# Patient Record
Sex: MALE | Race: WHITE | NOT HISPANIC OR LATINO | Employment: OTHER | URBAN - METROPOLITAN AREA
[De-identification: names, ages, dates, MRNs, and addresses within clinical notes are randomized per-mention and may not be internally consistent; named-entity substitution may affect disease eponyms.]

---

## 2018-11-30 ENCOUNTER — CONVERSION ENCOUNTER (OUTPATIENT)
Dept: OTHER | Facility: HOSPITAL | Age: 73
End: 2018-11-30

## 2018-11-30 ENCOUNTER — OFFICE VISIT CONVERTED (OUTPATIENT)
Dept: OTHER | Facility: HOSPITAL | Age: 73
End: 2018-11-30
Attending: SPECIALIST

## 2019-01-22 ENCOUNTER — CONVERSION ENCOUNTER (OUTPATIENT)
Dept: CARDIOLOGY | Facility: CLINIC | Age: 74
End: 2019-01-22
Attending: SPECIALIST

## 2020-01-21 ENCOUNTER — OFFICE VISIT CONVERTED (OUTPATIENT)
Dept: CARDIOLOGY | Facility: CLINIC | Age: 75
End: 2020-01-21
Attending: SPECIALIST

## 2020-06-26 ENCOUNTER — CONVERSION ENCOUNTER (OUTPATIENT)
Dept: SURGERY | Facility: CLINIC | Age: 75
End: 2020-06-26

## 2020-07-10 ENCOUNTER — OFFICE VISIT CONVERTED (OUTPATIENT)
Dept: SURGERY | Facility: CLINIC | Age: 75
End: 2020-07-10
Attending: SURGERY

## 2020-07-10 ENCOUNTER — CONVERSION ENCOUNTER (OUTPATIENT)
Dept: SURGERY | Facility: CLINIC | Age: 75
End: 2020-07-10

## 2020-07-27 ENCOUNTER — HOSPITAL ENCOUNTER (OUTPATIENT)
Dept: PREADMISSION TESTING | Facility: HOSPITAL | Age: 75
Discharge: HOME | End: 2020-07-27
Attending: SURGERY

## 2020-07-28 LAB — SARS-COV-2 RNA SPEC QL NAA+PROBE: NOT DETECTED

## 2020-07-30 ENCOUNTER — HOSPITAL ENCOUNTER (OUTPATIENT)
Dept: PERIOP | Facility: HOSPITAL | Age: 75
Setting detail: HOSPITAL OUTPATIENT SURGERY
Discharge: HOME | End: 2020-07-30
Attending: SURGERY

## 2020-08-11 ENCOUNTER — OFFICE VISIT CONVERTED (OUTPATIENT)
Dept: SURGERY | Facility: CLINIC | Age: 75
End: 2020-08-11
Attending: SURGERY

## 2020-09-21 ENCOUNTER — HOSPITAL ENCOUNTER (OUTPATIENT)
Dept: OTHER | Facility: HOSPITAL | Age: 75
Discharge: HOME | End: 2020-09-21
Attending: FAMILY MEDICINE

## 2020-10-02 ENCOUNTER — CONVERSION ENCOUNTER (OUTPATIENT)
Dept: OTHER | Facility: HOSPITAL | Age: 75
End: 2020-10-02

## 2020-10-02 ENCOUNTER — OFFICE VISIT CONVERTED (OUTPATIENT)
Dept: CARDIOLOGY | Facility: CLINIC | Age: 75
End: 2020-10-02
Attending: SPECIALIST

## 2021-01-21 ENCOUNTER — HOSPITAL ENCOUNTER (OUTPATIENT)
Dept: PERIOP | Facility: HOSPITAL | Age: 76
Setting detail: HOSPITAL OUTPATIENT SURGERY
Discharge: HOME | End: 2021-01-21
Attending: OPHTHALMOLOGY

## 2021-05-14 VITALS
SYSTOLIC BLOOD PRESSURE: 164 MMHG | DIASTOLIC BLOOD PRESSURE: 82 MMHG | BODY MASS INDEX: 27.48 KG/M2 | WEIGHT: 175.12 LBS | HEIGHT: 67 IN | HEART RATE: 43 BPM

## 2021-05-15 VITALS — WEIGHT: 170 LBS | HEIGHT: 67 IN | RESPIRATION RATE: 16 BRPM | BODY MASS INDEX: 26.68 KG/M2

## 2021-05-15 VITALS
DIASTOLIC BLOOD PRESSURE: 66 MMHG | HEART RATE: 40 BPM | SYSTOLIC BLOOD PRESSURE: 122 MMHG | BODY MASS INDEX: 28.25 KG/M2 | HEIGHT: 67 IN | WEIGHT: 180 LBS

## 2021-05-15 VITALS — BODY MASS INDEX: 26.76 KG/M2 | WEIGHT: 170.5 LBS | HEIGHT: 67 IN | RESPIRATION RATE: 12 BRPM

## 2021-05-15 VITALS — WEIGHT: 170 LBS | RESPIRATION RATE: 14 BRPM | BODY MASS INDEX: 26.68 KG/M2 | HEIGHT: 67 IN

## 2021-05-16 VITALS
DIASTOLIC BLOOD PRESSURE: 93 MMHG | BODY MASS INDEX: 29.82 KG/M2 | SYSTOLIC BLOOD PRESSURE: 132 MMHG | HEIGHT: 67 IN | WEIGHT: 190 LBS | HEART RATE: 52 BPM

## 2021-08-12 PROBLEM — R00.1 BRADYCARDIA, SINUS: Status: ACTIVE | Noted: 2021-08-12

## 2021-08-12 PROBLEM — E78.2 HYPERLIPEMIA, MIXED: Status: ACTIVE | Noted: 2021-08-12

## 2021-08-13 ENCOUNTER — OFFICE VISIT (OUTPATIENT)
Dept: CARDIOLOGY | Facility: CLINIC | Age: 76
End: 2021-08-13

## 2021-08-13 VITALS
BODY MASS INDEX: 27.31 KG/M2 | SYSTOLIC BLOOD PRESSURE: 146 MMHG | DIASTOLIC BLOOD PRESSURE: 78 MMHG | HEART RATE: 40 BPM | HEIGHT: 67 IN | WEIGHT: 174 LBS

## 2021-08-13 DIAGNOSIS — R00.1 BRADYCARDIA, SINUS: Primary | ICD-10-CM

## 2021-08-13 DIAGNOSIS — E78.2 HYPERLIPEMIA, MIXED: ICD-10-CM

## 2021-08-13 DIAGNOSIS — E02 SUBCLINICAL IODINE-DEFICIENCY HYPOTHYROIDISM: ICD-10-CM

## 2021-08-13 PROCEDURE — 93000 ELECTROCARDIOGRAM COMPLETE: CPT | Performed by: SPECIALIST

## 2021-08-13 PROCEDURE — 99214 OFFICE O/P EST MOD 30 MIN: CPT | Performed by: SPECIALIST

## 2021-08-13 RX ORDER — ATORVASTATIN CALCIUM 10 MG/1
TABLET, FILM COATED ORAL
COMMUNITY

## 2021-08-13 RX ORDER — CARVEDILOL 25 MG/1
25 TABLET ORAL 2 TIMES DAILY
COMMUNITY
Start: 2021-06-22 | End: 2022-02-11 | Stop reason: SDUPTHER

## 2021-08-13 RX ORDER — PANTOPRAZOLE SODIUM 40 MG/1
40 TABLET, DELAYED RELEASE ORAL AS NEEDED
COMMUNITY
Start: 2021-06-22

## 2021-08-13 RX ORDER — LEVOTHYROXINE SODIUM 0.05 MG/1
50 TABLET ORAL DAILY
COMMUNITY
Start: 2021-06-22

## 2021-08-13 RX ORDER — MONTELUKAST SODIUM 10 MG/1
10 TABLET ORAL DAILY
COMMUNITY
Start: 2021-06-22

## 2022-02-11 ENCOUNTER — OFFICE VISIT (OUTPATIENT)
Dept: CARDIOLOGY | Facility: CLINIC | Age: 77
End: 2022-02-11

## 2022-02-11 VITALS
HEART RATE: 42 BPM | HEIGHT: 67 IN | SYSTOLIC BLOOD PRESSURE: 142 MMHG | BODY MASS INDEX: 27.31 KG/M2 | DIASTOLIC BLOOD PRESSURE: 74 MMHG | WEIGHT: 174 LBS

## 2022-02-11 DIAGNOSIS — E78.2 HYPERLIPEMIA, MIXED: Primary | ICD-10-CM

## 2022-02-11 DIAGNOSIS — I10 HYPERTENSION, ESSENTIAL: ICD-10-CM

## 2022-02-11 PROCEDURE — 99214 OFFICE O/P EST MOD 30 MIN: CPT | Performed by: SPECIALIST

## 2022-02-11 RX ORDER — CARVEDILOL 25 MG/1
12.5 TABLET ORAL 2 TIMES DAILY
Qty: 90 TABLET | Refills: 3 | Status: SHIPPED | OUTPATIENT
Start: 2022-02-11

## 2022-02-11 RX ORDER — AMLODIPINE BESYLATE 5 MG/1
5 TABLET ORAL DAILY
Qty: 90 TABLET | Refills: 3 | Status: SHIPPED | OUTPATIENT
Start: 2022-02-11 | End: 2022-08-12

## 2022-08-12 ENCOUNTER — OFFICE VISIT (OUTPATIENT)
Dept: CARDIOLOGY | Facility: CLINIC | Age: 77
End: 2022-08-12

## 2022-08-12 VITALS
HEART RATE: 41 BPM | SYSTOLIC BLOOD PRESSURE: 144 MMHG | BODY MASS INDEX: 26.63 KG/M2 | DIASTOLIC BLOOD PRESSURE: 74 MMHG | WEIGHT: 170 LBS

## 2022-08-12 DIAGNOSIS — I10 HYPERTENSION, ESSENTIAL: Primary | ICD-10-CM

## 2022-08-12 DIAGNOSIS — E78.2 HYPERLIPEMIA, MIXED: ICD-10-CM

## 2022-08-12 PROCEDURE — 99214 OFFICE O/P EST MOD 30 MIN: CPT | Performed by: SPECIALIST

## 2022-08-12 RX ORDER — DOXAZOSIN 2 MG/1
2 TABLET ORAL DAILY
COMMUNITY
Start: 2022-06-28

## 2023-03-10 ENCOUNTER — OFFICE VISIT (OUTPATIENT)
Dept: CARDIOLOGY | Facility: CLINIC | Age: 78
End: 2023-03-10
Payer: MEDICARE

## 2023-03-10 VITALS
WEIGHT: 166 LBS | HEIGHT: 67 IN | HEART RATE: 44 BPM | BODY MASS INDEX: 26.06 KG/M2 | SYSTOLIC BLOOD PRESSURE: 130 MMHG | DIASTOLIC BLOOD PRESSURE: 68 MMHG

## 2023-03-10 DIAGNOSIS — E02 SUBCLINICAL IODINE-DEFICIENCY HYPOTHYROIDISM: ICD-10-CM

## 2023-03-10 DIAGNOSIS — E78.2 HYPERLIPEMIA, MIXED: ICD-10-CM

## 2023-03-10 DIAGNOSIS — I10 HYPERTENSION, ESSENTIAL: Primary | ICD-10-CM

## 2023-03-10 PROCEDURE — 93000 ELECTROCARDIOGRAM COMPLETE: CPT | Performed by: SPECIALIST

## 2023-03-10 PROCEDURE — 1159F MED LIST DOCD IN RCRD: CPT | Performed by: SPECIALIST

## 2023-03-10 PROCEDURE — 1160F RVW MEDS BY RX/DR IN RCRD: CPT | Performed by: SPECIALIST

## 2023-03-10 PROCEDURE — 99214 OFFICE O/P EST MOD 30 MIN: CPT | Performed by: SPECIALIST

## 2023-11-10 ENCOUNTER — OFFICE VISIT (OUTPATIENT)
Dept: CARDIOLOGY | Facility: CLINIC | Age: 78
End: 2023-11-10
Payer: MEDICARE

## 2023-11-10 VITALS
WEIGHT: 156 LBS | BODY MASS INDEX: 24.48 KG/M2 | DIASTOLIC BLOOD PRESSURE: 76 MMHG | HEART RATE: 47 BPM | HEIGHT: 67 IN | SYSTOLIC BLOOD PRESSURE: 136 MMHG

## 2023-11-10 DIAGNOSIS — I10 HYPERTENSION, ESSENTIAL: Primary | ICD-10-CM

## 2023-11-10 DIAGNOSIS — E78.2 HYPERLIPEMIA, MIXED: ICD-10-CM

## 2023-11-10 PROCEDURE — 1159F MED LIST DOCD IN RCRD: CPT | Performed by: SPECIALIST

## 2023-11-10 PROCEDURE — 99214 OFFICE O/P EST MOD 30 MIN: CPT | Performed by: SPECIALIST

## 2023-11-10 PROCEDURE — 1160F RVW MEDS BY RX/DR IN RCRD: CPT | Performed by: SPECIALIST

## 2023-11-10 RX ORDER — CARVEDILOL 25 MG/1
12.5 TABLET ORAL 2 TIMES DAILY
Qty: 90 TABLET | Refills: 3 | Status: SHIPPED | OUTPATIENT
Start: 2023-11-10

## 2024-01-01 ENCOUNTER — OFFICE VISIT (OUTPATIENT)
Dept: ONCOLOGY | Facility: HOSPITAL | Age: 79
End: 2024-01-01
Payer: MEDICARE

## 2024-01-01 ENCOUNTER — ANESTHESIA (OUTPATIENT)
Dept: CARDIOVASCULAR ICU | Facility: HOSPITAL | Age: 79
End: 2024-01-01

## 2024-01-01 ENCOUNTER — ANESTHESIA (OUTPATIENT)
Dept: PERIOP | Facility: HOSPITAL | Age: 79
End: 2024-01-01
Payer: MEDICARE

## 2024-01-01 ENCOUNTER — HOSPITAL ENCOUNTER (OUTPATIENT)
Dept: ONCOLOGY | Facility: HOSPITAL | Age: 79
Discharge: HOME | End: 2024-12-05
Payer: MEDICARE

## 2024-01-01 ENCOUNTER — HOSPITAL ENCOUNTER (INPATIENT)
Facility: HOSPITAL | Age: 79
LOS: 1 days | DRG: 871 | End: 2024-12-07
Attending: EMERGENCY MEDICINE
Payer: MEDICARE

## 2024-01-01 ENCOUNTER — APPOINTMENT (OUTPATIENT)
Dept: GENERAL RADIOLOGY | Facility: HOSPITAL | Age: 79
End: 2024-01-01
Payer: MEDICARE

## 2024-01-01 ENCOUNTER — ANESTHESIA EVENT (OUTPATIENT)
Dept: PERIOP | Facility: HOSPITAL | Age: 79
End: 2024-01-01
Payer: MEDICARE

## 2024-01-01 ENCOUNTER — TELEPHONE (OUTPATIENT)
Dept: ONCOLOGY | Facility: HOSPITAL | Age: 79
End: 2024-01-01
Payer: MEDICARE

## 2024-01-01 ENCOUNTER — APPOINTMENT (OUTPATIENT)
Dept: GENERAL RADIOLOGY | Facility: HOSPITAL | Age: 79
DRG: 871 | End: 2024-01-01
Payer: MEDICARE

## 2024-01-01 ENCOUNTER — DOCUMENTATION (OUTPATIENT)
Dept: NUTRITION | Facility: HOSPITAL | Age: 79
End: 2024-01-01
Payer: MEDICARE

## 2024-01-01 ENCOUNTER — ANESTHESIA EVENT (OUTPATIENT)
Dept: CARDIOVASCULAR ICU | Facility: HOSPITAL | Age: 79
End: 2024-01-01

## 2024-01-01 ENCOUNTER — HOSPITAL ENCOUNTER (OUTPATIENT)
Facility: HOSPITAL | Age: 79
Setting detail: HOSPITAL OUTPATIENT SURGERY
Discharge: HOME | End: 2024-12-02
Attending: SURGERY | Admitting: SURGERY
Payer: MEDICARE

## 2024-01-01 ENCOUNTER — HOSPITAL ENCOUNTER (OUTPATIENT)
Dept: PET IMAGING | Facility: HOSPITAL | Age: 79
Discharge: HOME | End: 2024-12-04
Payer: MEDICARE

## 2024-01-01 ENCOUNTER — LAB (OUTPATIENT)
Dept: ONCOLOGY | Facility: HOSPITAL | Age: 79
End: 2024-01-01
Payer: MEDICARE

## 2024-01-01 ENCOUNTER — APPOINTMENT (OUTPATIENT)
Dept: CT IMAGING | Facility: HOSPITAL | Age: 79
DRG: 871 | End: 2024-01-01
Payer: MEDICARE

## 2024-01-01 ENCOUNTER — HOSPITAL ENCOUNTER (OUTPATIENT)
Dept: ONCOLOGY | Facility: HOSPITAL | Age: 79
End: 2024-01-01
Payer: MEDICARE

## 2024-01-01 ENCOUNTER — DOCUMENTATION (OUTPATIENT)
Dept: RADIATION ONCOLOGY | Facility: HOSPITAL | Age: 79
End: 2024-01-01
Payer: MEDICARE

## 2024-01-01 VITALS
HEIGHT: 67 IN | HEART RATE: 63 BPM | WEIGHT: 142.2 LBS | OXYGEN SATURATION: 97 % | DIASTOLIC BLOOD PRESSURE: 60 MMHG | RESPIRATION RATE: 20 BRPM | BODY MASS INDEX: 22.32 KG/M2 | TEMPERATURE: 97 F | SYSTOLIC BLOOD PRESSURE: 112 MMHG

## 2024-01-01 VITALS
TEMPERATURE: 97.7 F | DIASTOLIC BLOOD PRESSURE: 71 MMHG | HEIGHT: 66 IN | BODY MASS INDEX: 23.67 KG/M2 | RESPIRATION RATE: 18 BRPM | HEART RATE: 53 BPM | WEIGHT: 147.27 LBS | OXYGEN SATURATION: 97 % | SYSTOLIC BLOOD PRESSURE: 111 MMHG

## 2024-01-01 VITALS
WEIGHT: 146.39 LBS | BODY MASS INDEX: 22.98 KG/M2 | RESPIRATION RATE: 22 BRPM | SYSTOLIC BLOOD PRESSURE: 131 MMHG | DIASTOLIC BLOOD PRESSURE: 56 MMHG | OXYGEN SATURATION: 94 % | TEMPERATURE: 98.1 F | HEIGHT: 67 IN

## 2024-01-01 VITALS
OXYGEN SATURATION: 97 % | HEART RATE: 51 BPM | SYSTOLIC BLOOD PRESSURE: 135 MMHG | DIASTOLIC BLOOD PRESSURE: 81 MMHG | TEMPERATURE: 98.4 F | HEIGHT: 67 IN | WEIGHT: 147.4 LBS | RESPIRATION RATE: 16 BRPM | BODY MASS INDEX: 23.13 KG/M2

## 2024-01-01 DIAGNOSIS — Z71.9 ENCOUNTER FOR HEALTH EDUCATION: ICD-10-CM

## 2024-01-01 DIAGNOSIS — I46.9 CARDIAC ARREST: ICD-10-CM

## 2024-01-01 DIAGNOSIS — C43.4 MALIGNANT MELANOMA OF NECK: Primary | ICD-10-CM

## 2024-01-01 DIAGNOSIS — C43.4 MALIGNANT MELANOMA OF NECK: ICD-10-CM

## 2024-01-01 DIAGNOSIS — C43.9 METASTATIC MELANOMA: ICD-10-CM

## 2024-01-01 DIAGNOSIS — R65.20 SEVERE SEPSIS: ICD-10-CM

## 2024-01-01 DIAGNOSIS — Z45.2 ENCOUNTER FOR ADJUSTMENT OR MANAGEMENT OF VASCULAR ACCESS DEVICE: ICD-10-CM

## 2024-01-01 DIAGNOSIS — Z79.899 HIGH RISK MEDICATIONS (NOT ANTICOAGULANTS) LONG-TERM USE: ICD-10-CM

## 2024-01-01 DIAGNOSIS — E87.5 HYPERKALEMIA: ICD-10-CM

## 2024-01-01 DIAGNOSIS — K63.1 BOWEL PERFORATION: Primary | ICD-10-CM

## 2024-01-01 DIAGNOSIS — A41.9 SEVERE SEPSIS: ICD-10-CM

## 2024-01-01 LAB
ACTH PLAS-MCNC: 7.1 PG/ML (ref 7.2–63.3)
ALBUMIN SERPL-MCNC: 0.6 G/DL (ref 3.5–5.2)
ALBUMIN SERPL-MCNC: 3.4 G/DL (ref 3.5–5.2)
ALBUMIN SERPL-MCNC: NORMAL G/DL
ALBUMIN/GLOB SERPL: 0.1 G/DL
ALBUMIN/GLOB SERPL: 1.4 G/DL
ALBUMIN/GLOB SERPL: NORMAL {RATIO}
ALP SERPL-CCNC: 106 U/L (ref 39–117)
ALP SERPL-CCNC: 129 U/L (ref 39–117)
ALP SERPL-CCNC: NORMAL U/L
ALT SERPL W P-5'-P-CCNC: 48 U/L (ref 1–41)
ALT SERPL W P-5'-P-CCNC: 73 U/L (ref 1–41)
ALT SERPL W P-5'-P-CCNC: NORMAL U/L
ANION GAP SERPL CALCULATED.3IONS-SCNC: 11.6 MMOL/L (ref 5–15)
ANION GAP SERPL CALCULATED.3IONS-SCNC: 23.5 MMOL/L (ref 5–15)
ANION GAP SERPL CALCULATED.3IONS-SCNC: NORMAL MMOL/L
ARTERIAL PATENCY WRIST A: POSITIVE
ARTERIAL PATENCY WRIST A: POSITIVE
AST SERPL-CCNC: 23 U/L (ref 1–40)
AST SERPL-CCNC: 466 U/L (ref 1–40)
AST SERPL-CCNC: NORMAL U/L
ATMOSPHERIC PRESS: 749.7 MMHG
ATMOSPHERIC PRESS: 753.7 MMHG
BACTERIA UR QL AUTO: ABNORMAL /HPF
BASE EXCESS BLDA CALC-SCNC: -4.3 MMOL/L (ref -2–2)
BASE EXCESS BLDA CALC-SCNC: 3.3 MMOL/L (ref -2–2)
BASOPHILS # BLD AUTO: 0.02 10*3/MM3 (ref 0–0.2)
BASOPHILS # BLD AUTO: 0.02 10*3/MM3 (ref 0–0.2)
BASOPHILS # BLD AUTO: 0.07 10*3/MM3 (ref 0–0.2)
BASOPHILS NFR BLD AUTO: 0.1 % (ref 0–1.5)
BDY SITE: ABNORMAL
BDY SITE: ABNORMAL
BILIRUB SERPL-MCNC: 1.9 MG/DL (ref 0–1.2)
BILIRUB SERPL-MCNC: 4.5 MG/DL (ref 0–1.2)
BILIRUB SERPL-MCNC: NORMAL MG/DL
BILIRUB UR QL STRIP: ABNORMAL
BUN BLDA-MCNC: 41 MG/DL (ref 8–23)
BUN BLDA-MCNC: 47 MG/DL (ref 8–23)
BUN SERPL-MCNC: 36 MG/DL (ref 8–23)
BUN SERPL-MCNC: 51 MG/DL (ref 8–23)
BUN SERPL-MCNC: NORMAL MG/DL
BUN/CREAT SERPL: 31.5 (ref 7–25)
BUN/CREAT SERPL: 35 (ref 7–25)
BUN/CREAT SERPL: NORMAL
CA-I BLDA-SCNC: 1.19 MMOL/L (ref 1.13–1.32)
CA-I BLDA-SCNC: 1.44 MMOL/L (ref 1.13–1.32)
CALCIUM SPEC-SCNC: 8.3 MG/DL (ref 8.6–10.5)
CALCIUM SPEC-SCNC: 8.4 MG/DL (ref 8.6–10.5)
CALCIUM SPEC-SCNC: NORMAL MMOL/L
CHLORIDE BLDA-SCNC: 94 MMOL/L (ref 98–107)
CHLORIDE BLDA-SCNC: 96 MMOL/L (ref 98–107)
CHLORIDE SERPL-SCNC: 95 MMOL/L (ref 98–107)
CHLORIDE SERPL-SCNC: 98 MMOL/L (ref 98–107)
CHLORIDE SERPL-SCNC: NORMAL MMOL/L
CLARITY UR: ABNORMAL
CO2 BLDA-SCNC: 22.3 MMOL/L (ref 23–27)
CO2 BLDA-SCNC: 29.6 MMOL/L (ref 23–27)
CO2 SERPL-SCNC: 11.5 MMOL/L (ref 22–29)
CO2 SERPL-SCNC: 25.4 MMOL/L (ref 22–29)
CO2 SERPL-SCNC: NORMAL MMOL/L
COLOR UR: ABNORMAL
CREAT BLDA-MCNC: 2.16 MG/DL (ref 0.6–1.3)
CREAT BLDA-MCNC: 2.33 MG/DL (ref 0.6–1.3)
CREAT SERPL-MCNC: 1.03 MG/DL (ref 0.76–1.27)
CREAT SERPL-MCNC: 1.62 MG/DL (ref 0.76–1.27)
CREAT SERPL-MCNC: NORMAL MG/DL
D-LACTATE SERPL-SCNC: 10 MMOL/L
D-LACTATE SERPL-SCNC: 10.7 MMOL/L
D-LACTATE SERPL-SCNC: 7.7 MMOL/L (ref 0.5–2)
DEPRECATED RDW RBC AUTO: 41.5 FL (ref 37–54)
DEPRECATED RDW RBC AUTO: 42.7 FL (ref 37–54)
DEPRECATED RDW RBC AUTO: 74.6 FL (ref 37–54)
EGFRCR SERPLBLD CKD-EPI 2021: 43.2 ML/MIN/1.73
EGFRCR SERPLBLD CKD-EPI 2021: 74.4 ML/MIN/1.73
EOSINOPHIL # BLD AUTO: 0 10*3/MM3 (ref 0–0.4)
EOSINOPHIL # BLD AUTO: 0.02 10*3/MM3 (ref 0–0.4)
EOSINOPHIL # BLD AUTO: 0.14 10*3/MM3 (ref 0–0.4)
EOSINOPHIL NFR BLD AUTO: 0 % (ref 0.3–6.2)
EOSINOPHIL NFR BLD AUTO: 0.1 % (ref 0.3–6.2)
EOSINOPHIL NFR BLD AUTO: 0.2 % (ref 0.3–6.2)
ERYTHROCYTE [DISTWIDTH] IN BLOOD BY AUTOMATED COUNT: 12.4 % (ref 12.3–15.4)
ERYTHROCYTE [DISTWIDTH] IN BLOOD BY AUTOMATED COUNT: 22.5 % (ref 12.3–15.4)
ERYTHROCYTE [DISTWIDTH] IN BLOOD BY AUTOMATED COUNT: 25.9 % (ref 12.3–15.4)
GLOBULIN UR ELPH-MCNC: 2.5 GM/DL
GLOBULIN UR ELPH-MCNC: 4.8 GM/DL
GLOBULIN UR ELPH-MCNC: NORMAL MG/DL
GLUCOSE BLDC GLUCOMTR-MCNC: 103 MG/DL (ref 70–99)
GLUCOSE BLDC GLUCOMTR-MCNC: 109 MG/DL (ref 70–99)
GLUCOSE SERPL-MCNC: 120 MG/DL (ref 65–99)
GLUCOSE SERPL-MCNC: 85 MG/DL (ref 65–99)
GLUCOSE SERPL-MCNC: NORMAL MG/DL
GLUCOSE UR STRIP-MCNC: ABNORMAL MG/DL
HCO3 BLDA-SCNC: 21.9 MMOL/L (ref 22–26)
HCO3 BLDA-SCNC: 30.1 MMOL/L (ref 22–26)
HCT VFR BLD AUTO: 37.6 % (ref 37.5–51)
HCT VFR BLD AUTO: 40.3 % (ref 37.5–51)
HCT VFR BLD AUTO: 8 % (ref 37.5–51)
HCT VFR BLD CALC: 13 % (ref 38–51)
HCT VFR BLD CALC: <10 % (ref 38–51)
HEMODILUTION: NO
HEMODILUTION: NO
HGB BLD-MCNC: 12.8 G/DL (ref 13–17.7)
HGB BLD-MCNC: 13.9 G/DL (ref 13–17.7)
HGB BLD-MCNC: 4.4 G/DL (ref 13–17.7)
HGB BLDA-MCNC: 4.5 G/DL (ref 12–18)
HGB UR QL STRIP.AUTO: ABNORMAL
HOLD SPECIMEN: NORMAL
HYALINE CASTS UR QL AUTO: ABNORMAL /LPF
IMM GRANULOCYTES # BLD AUTO: 0.13 10*3/MM3 (ref 0–0.05)
IMM GRANULOCYTES # BLD AUTO: 4.6 10*3/MM3 (ref 0–0.05)
IMM GRANULOCYTES # BLD AUTO: 4.73 10*3/MM3 (ref 0–0.05)
IMM GRANULOCYTES NFR BLD AUTO: 0.8 % (ref 0–0.5)
IMM GRANULOCYTES NFR BLD AUTO: 19 % (ref 0–0.5)
IMM GRANULOCYTES NFR BLD AUTO: 7 % (ref 0–0.5)
INHALED O2 CONCENTRATION: 100 %
INHALED O2 CONCENTRATION: 60 %
KETONES UR QL STRIP: ABNORMAL
LEUKOCYTE ESTERASE UR QL STRIP.AUTO: ABNORMAL
LIPASE SERPL-CCNC: NORMAL U/L
LYMPHOCYTES # BLD AUTO: 0.3 10*3/MM3 (ref 0.7–3.1)
LYMPHOCYTES # BLD AUTO: 0.6 10*3/MM3 (ref 0.7–3.1)
LYMPHOCYTES # BLD AUTO: 0.63 10*3/MM3 (ref 0.7–3.1)
LYMPHOCYTES # BLD MANUAL: 0 10*3/MM3 (ref 0.7–3.1)
LYMPHOCYTES NFR BLD AUTO: 0.9 % (ref 19.6–45.3)
LYMPHOCYTES NFR BLD AUTO: 1.2 % (ref 19.6–45.3)
LYMPHOCYTES NFR BLD AUTO: 4 % (ref 19.6–45.3)
LYMPHOCYTES NFR BLD MANUAL: 5 % (ref 5–12)
Lab: ABNORMAL
MAGNESIUM SERPL-MCNC: NORMAL MG/DL
MCH RBC QN AUTO: 29.3 PG (ref 26.6–33)
MCH RBC QN AUTO: 31.8 PG (ref 26.6–33)
MCH RBC QN AUTO: 34.1 PG (ref 26.6–33)
MCHC RBC AUTO-ENTMCNC: 34 G/DL (ref 31.5–35.7)
MCHC RBC AUTO-ENTMCNC: 34.5 G/DL (ref 31.5–35.7)
MCHC RBC AUTO-ENTMCNC: 55 G/DL (ref 31.5–35.7)
MCV RBC AUTO: 100.3 FL (ref 79–97)
MCV RBC AUTO: 53.3 FL (ref 79–97)
MCV RBC AUTO: 92.2 FL (ref 79–97)
MODALITY: ABNORMAL
MODALITY: ABNORMAL
MONOCYTES # BLD AUTO: 0.55 10*3/MM3 (ref 0.1–0.9)
MONOCYTES # BLD AUTO: 2.6 10*3/MM3 (ref 0.1–0.9)
MONOCYTES # BLD AUTO: 2.74 10*3/MM3 (ref 0.1–0.9)
MONOCYTES # BLD: 3.3 10*3/MM3 (ref 0.1–0.9)
MONOCYTES NFR BLD AUTO: 17.4 % (ref 5–12)
MONOCYTES NFR BLD AUTO: 2.2 % (ref 5–12)
MONOCYTES NFR BLD AUTO: 3.9 % (ref 5–12)
NEUTROPHILS # BLD AUTO: 62.79 10*3/MM3 (ref 1.7–7)
NEUTROPHILS NFR BLD AUTO: 12.21 10*3/MM3 (ref 1.7–7)
NEUTROPHILS NFR BLD AUTO: 19.3 10*3/MM3 (ref 1.7–7)
NEUTROPHILS NFR BLD AUTO: 58.08 10*3/MM3 (ref 1.7–7)
NEUTROPHILS NFR BLD AUTO: 77.4 % (ref 42.7–76)
NEUTROPHILS NFR BLD AUTO: 77.7 % (ref 42.7–76)
NEUTROPHILS NFR BLD AUTO: 87.9 % (ref 42.7–76)
NEUTROPHILS NFR BLD MANUAL: 86 % (ref 42.7–76)
NEUTS BAND NFR BLD MANUAL: 9 % (ref 0–5)
NITRITE UR QL STRIP: ABNORMAL
NOTIFIED WHO: ABNORMAL
NRBC BLD AUTO-RTO: 0 /100 WBC (ref 0–0.2)
NRBC BLD AUTO-RTO: 0.1 /100 WBC (ref 0–0.2)
NRBC BLD AUTO-RTO: 0.8 /100 WBC (ref 0–0.2)
NRBC SPEC MANUAL: 1 /100 WBC (ref 0–0.2)
PCO2 BLDA: 48.2 MM HG (ref 35–45)
PCO2 BLDA: 63.8 MM HG (ref 35–45)
PEEP RESPIRATORY: 5 CM[H2O]
PEEP RESPIRATORY: 5 CM[H2O]
PH BLDA: 7.26 PH UNITS (ref 7.35–7.45)
PH BLDA: 7.28 PH UNITS (ref 7.35–7.45)
PH UR STRIP.AUTO: ABNORMAL [PH]
PLAT MORPH BLD: NORMAL
PLATELET # BLD AUTO: 124 10*3/MM3 (ref 140–450)
PLATELET # BLD AUTO: 150 10*3/MM3 (ref 140–450)
PLATELET # BLD AUTO: 48 10*3/MM3 (ref 140–450)
PMV BLD AUTO: 12.5 FL (ref 6–12)
PMV BLD AUTO: ABNORMAL FL
PMV BLD AUTO: ABNORMAL FL
PO2 BLD: 508 MM[HG] (ref 0–500)
PO2 BLD: 68 MM[HG] (ref 0–500)
PO2 BLDA: 40.8 MM HG (ref 80–100)
PO2 BLDA: 507.5 MM HG (ref 80–100)
POTASSIUM BLDA-SCNC: 8.2 MMOL/L (ref 3.5–5)
POTASSIUM BLDA-SCNC: 9 MMOL/L (ref 3.5–5)
POTASSIUM SERPL-SCNC: 4.7 MMOL/L (ref 3.5–5.2)
POTASSIUM SERPL-SCNC: 7.8 MMOL/L (ref 3.5–5.2)
POTASSIUM SERPL-SCNC: NORMAL MMOL/L
PROT SERPL-MCNC: 5.4 G/DL (ref 6–8.5)
PROT SERPL-MCNC: 5.9 G/DL (ref 6–8.5)
PROT SERPL-MCNC: NORMAL G/DL
PROT UR QL STRIP: ABNORMAL
RBC # BLD AUTO: 1.5 10*6/MM3 (ref 4.14–5.8)
RBC # BLD AUTO: 3.75 10*6/MM3 (ref 4.14–5.8)
RBC # BLD AUTO: 4.37 10*6/MM3 (ref 4.14–5.8)
RBC # UR STRIP: ABNORMAL /HPF
RBC MORPH BLD: NORMAL
READ BACK: YES
REF LAB TEST METHOD: ABNORMAL
RESPIRATORY RATE: 20
RESPIRATORY RATE: 20
SAO2 % BLDCOA: 100 % (ref 95–99)
SAO2 % BLDCOA: 68.1 % (ref 95–99)
SODIUM BLD-SCNC: 136 MMOL/L (ref 131–143)
SODIUM BLD-SCNC: 136 MMOL/L (ref 131–143)
SODIUM SERPL-SCNC: 132 MMOL/L (ref 136–145)
SODIUM SERPL-SCNC: 133 MMOL/L (ref 136–145)
SODIUM SERPL-SCNC: NORMAL MMOL/L
SP GR UR STRIP: ABNORMAL
SQUAMOUS #/AREA URNS HPF: ABNORMAL /HPF
UROBILINOGEN UR QL STRIP: ABNORMAL
VARIANT LYMPHS NFR BLD MANUAL: 0 % (ref 19.6–45.3)
VENTILATOR MODE: AC
VENTILATOR MODE: AC
VT ON VENT VENT: 440 ML
VT ON VENT VENT: 440 ML
WBC # UR STRIP: ABNORMAL /HPF
WBC MORPH BLD: NORMAL
WBC NRBC COR # BLD AUTO: 15.73 10*3/MM3 (ref 3.4–10.8)
WBC NRBC COR # BLD AUTO: 24.92 10*3/MM3 (ref 3.4–10.8)
WBC NRBC COR # BLD AUTO: 66.09 10*3/MM3 (ref 3.4–10.8)
WHOLE BLOOD HOLD COAG: NORMAL
WHOLE BLOOD HOLD SPECIMEN: NORMAL

## 2024-01-01 PROCEDURE — 25010000002 PIPERACILLIN SOD-TAZOBACTAM PER 1 G: Performed by: EMERGENCY MEDICINE

## 2024-01-01 PROCEDURE — 36561 INSERT TUNNELED CV CATH: CPT | Performed by: SURGERY

## 2024-01-01 PROCEDURE — 25010000002 HEPARIN (PORCINE) PER 1000 UNITS: Performed by: SURGERY

## 2024-01-01 PROCEDURE — 80053 COMPREHEN METABOLIC PANEL: CPT | Performed by: EMERGENCY MEDICINE

## 2024-01-01 PROCEDURE — 87040 BLOOD CULTURE FOR BACTERIA: CPT | Performed by: EMERGENCY MEDICINE

## 2024-01-01 PROCEDURE — 82024 ASSAY OF ACTH: CPT | Performed by: INTERNAL MEDICINE

## 2024-01-01 PROCEDURE — 92950 HEART/LUNG RESUSCITATION CPR: CPT

## 2024-01-01 PROCEDURE — 36415 COLL VENOUS BLD VENIPUNCTURE: CPT

## 2024-01-01 PROCEDURE — 99223 1ST HOSP IP/OBS HIGH 75: CPT | Performed by: FAMILY MEDICINE

## 2024-01-01 PROCEDURE — 87185 SC STD ENZYME DETCJ PER NZM: CPT | Performed by: EMERGENCY MEDICINE

## 2024-01-01 PROCEDURE — 80047 BASIC METABLC PNL IONIZED CA: CPT

## 2024-01-01 PROCEDURE — 82803 BLOOD GASES ANY COMBINATION: CPT

## 2024-01-01 PROCEDURE — G0463 HOSPITAL OUTPT CLINIC VISIT: HCPCS | Performed by: PHYSICIAN ASSISTANT

## 2024-01-01 PROCEDURE — 99291 CRITICAL CARE FIRST HOUR: CPT

## 2024-01-01 PROCEDURE — 25010000002 VANCOMYCIN 5 G RECONSTITUTED SOLUTION: Performed by: EMERGENCY MEDICINE

## 2024-01-01 PROCEDURE — 94799 UNLISTED PULMONARY SVC/PX: CPT

## 2024-01-01 PROCEDURE — 25010000002 DEXAMETHASONE PER 1 MG: Performed by: NURSE ANESTHETIST, CERTIFIED REGISTERED

## 2024-01-01 PROCEDURE — 25810000003 SODIUM CHLORIDE 0.9 % SOLUTION 250 ML FLEX CONT: Performed by: INTERNAL MEDICINE

## 2024-01-01 PROCEDURE — 5A12012 PERFORMANCE OF CARDIAC OUTPUT, SINGLE, MANUAL: ICD-10-PCS | Performed by: EMERGENCY MEDICINE

## 2024-01-01 PROCEDURE — 77001 FLUOROGUIDE FOR VEIN DEVICE: CPT | Performed by: SURGERY

## 2024-01-01 PROCEDURE — 25010000002 FENTANYL CITRATE (PF) 50 MCG/ML SOLUTION: Performed by: NURSE ANESTHETIST, CERTIFIED REGISTERED

## 2024-01-01 PROCEDURE — C1788 PORT, INDWELLING, IMP: HCPCS | Performed by: SURGERY

## 2024-01-01 PROCEDURE — 83690 ASSAY OF LIPASE: CPT | Performed by: EMERGENCY MEDICINE

## 2024-01-01 PROCEDURE — 36600 WITHDRAWAL OF ARTERIAL BLOOD: CPT

## 2024-01-01 PROCEDURE — 34310000005 FLUDEOXYGLUCOSE F18 SOLUTION: Performed by: INTERNAL MEDICINE

## 2024-01-01 PROCEDURE — 83605 ASSAY OF LACTIC ACID: CPT

## 2024-01-01 PROCEDURE — 71045 X-RAY EXAM CHEST 1 VIEW: CPT

## 2024-01-01 PROCEDURE — 87205 SMEAR GRAM STAIN: CPT | Performed by: EMERGENCY MEDICINE

## 2024-01-01 PROCEDURE — 25010000002 LIDOCAINE PF 2% 2 % SOLUTION: Performed by: NURSE ANESTHETIST, CERTIFIED REGISTERED

## 2024-01-01 PROCEDURE — 31500 INSERT EMERGENCY AIRWAY: CPT

## 2024-01-01 PROCEDURE — 81001 URINALYSIS AUTO W/SCOPE: CPT | Performed by: EMERGENCY MEDICINE

## 2024-01-01 PROCEDURE — 87076 CULTURE ANAEROBE IDENT EACH: CPT | Performed by: EMERGENCY MEDICINE

## 2024-01-01 PROCEDURE — 25010000003 DEXTROSE 5 % SOLUTION: Performed by: EMERGENCY MEDICINE

## 2024-01-01 PROCEDURE — 25810000003 LACTATED RINGERS PER 1000 ML: Performed by: ANESTHESIOLOGY

## 2024-01-01 PROCEDURE — 25010000002 EPINEPHRINE 1 MG/ML SOLUTION: Performed by: EMERGENCY MEDICINE

## 2024-01-01 PROCEDURE — 25810000003 SODIUM CHLORIDE 0.9 % SOLUTION: Performed by: EMERGENCY MEDICINE

## 2024-01-01 PROCEDURE — 0BH17EZ INSERTION OF ENDOTRACHEAL AIRWAY INTO TRACHEA, VIA NATURAL OR ARTIFICIAL OPENING: ICD-10-PCS | Performed by: EMERGENCY MEDICINE

## 2024-01-01 PROCEDURE — 25010000002 ATROPINE SULFATE: Performed by: EMERGENCY MEDICINE

## 2024-01-01 PROCEDURE — 83735 ASSAY OF MAGNESIUM: CPT | Performed by: EMERGENCY MEDICINE

## 2024-01-01 PROCEDURE — 85025 COMPLETE CBC W/AUTO DIFF WBC: CPT | Performed by: EMERGENCY MEDICINE

## 2024-01-01 PROCEDURE — 25010000002 BUPIVACAINE (PF) 0.25 % SOLUTION: Performed by: SURGERY

## 2024-01-01 PROCEDURE — 74176 CT ABD & PELVIS W/O CONTRAST: CPT

## 2024-01-01 PROCEDURE — 80053 COMPREHEN METABOLIC PANEL: CPT | Performed by: INTERNAL MEDICINE

## 2024-01-01 PROCEDURE — 96413 CHEMO IV INFUSION 1 HR: CPT

## 2024-01-01 PROCEDURE — 25010000002 PROPOFOL 10 MG/ML EMULSION: Performed by: NURSE ANESTHETIST, CERTIFIED REGISTERED

## 2024-01-01 PROCEDURE — 96417 CHEMO IV INFUS EACH ADDL SEQ: CPT

## 2024-01-01 PROCEDURE — 25010000002 IPILIMUMAB 50 MG/10ML SOLUTION 10 ML VIAL: Performed by: INTERNAL MEDICINE

## 2024-01-01 PROCEDURE — 76000 FLUOROSCOPY <1 HR PHYS/QHP: CPT

## 2024-01-01 PROCEDURE — 85007 BL SMEAR W/DIFF WBC COUNT: CPT | Performed by: EMERGENCY MEDICINE

## 2024-01-01 PROCEDURE — 87070 CULTURE OTHR SPECIMN AEROBIC: CPT | Performed by: EMERGENCY MEDICINE

## 2024-01-01 PROCEDURE — 25010000002 NIVOLUMAB 40 MG/4ML SOLUTION 4 ML VIAL: Performed by: INTERNAL MEDICINE

## 2024-01-01 PROCEDURE — 85025 COMPLETE CBC W/AUTO DIFF WBC: CPT | Performed by: INTERNAL MEDICINE

## 2024-01-01 PROCEDURE — 63710000001 INSULIN REGULAR HUMAN PER 5 UNITS: Performed by: EMERGENCY MEDICINE

## 2024-01-01 PROCEDURE — 78816 PET IMAGE W/CT FULL BODY: CPT

## 2024-01-01 PROCEDURE — 25010000002 ONDANSETRON PER 1 MG: Performed by: NURSE ANESTHETIST, CERTIFIED REGISTERED

## 2024-01-01 PROCEDURE — 5A1935Z RESPIRATORY VENTILATION, LESS THAN 24 CONSECUTIVE HOURS: ICD-10-PCS | Performed by: EMERGENCY MEDICINE

## 2024-01-01 PROCEDURE — 25810000003 SODIUM CHLORIDE 0.9 % SOLUTION: Performed by: INTERNAL MEDICINE

## 2024-01-01 PROCEDURE — 25010000002 PROPOFOL 10 MG/ML EMULSION: Performed by: EMERGENCY MEDICINE

## 2024-01-01 PROCEDURE — A9552 F18 FDG: HCPCS | Performed by: INTERNAL MEDICINE

## 2024-01-01 PROCEDURE — 25010000002 GLYCOPYRROLATE 0.2 MG/ML SOLUTION: Performed by: NURSE ANESTHETIST, CERTIFIED REGISTERED

## 2024-01-01 PROCEDURE — 25810000003 LACTATED RINGERS PER 1000 ML: Performed by: NURSE ANESTHETIST, CERTIFIED REGISTERED

## 2024-01-01 PROCEDURE — 25010000002 HEPARIN LOCK FLUSH PER 10 UNITS: Performed by: INTERNAL MEDICINE

## 2024-01-01 PROCEDURE — 87077 CULTURE AEROBIC IDENTIFY: CPT | Performed by: EMERGENCY MEDICINE

## 2024-01-01 DEVICE — POWERPORT CLEARVUE ISP IMPLANTABLE PORT WITH ATTACHABLE 8F POLYURETHANE OPEN-ENDED SINGLE-LUMEN VENOUS CATHETER PROCEDURAL KIT
Type: IMPLANTABLE DEVICE | Site: CHEST | Status: FUNCTIONAL
Brand: POWERPORT CLEARVUE

## 2024-01-01 RX ORDER — SODIUM CHLORIDE 9 MG/ML
75 INJECTION, SOLUTION INTRAVENOUS CONTINUOUS
Status: DISCONTINUED | OUTPATIENT
Start: 2024-01-01 | End: 2024-01-01 | Stop reason: HOSPADM

## 2024-01-01 RX ORDER — EPHEDRINE SULFATE 50 MG/ML
INJECTION INTRAVENOUS AS NEEDED
Status: DISCONTINUED | OUTPATIENT
Start: 2024-01-01 | End: 2024-01-01 | Stop reason: SURG

## 2024-01-01 RX ORDER — HYDROMORPHONE HYDROCHLORIDE 1 MG/ML
0.5 INJECTION, SOLUTION INTRAMUSCULAR; INTRAVENOUS; SUBCUTANEOUS
Status: DISCONTINUED | OUTPATIENT
Start: 2024-01-01 | End: 2024-01-01 | Stop reason: HOSPADM

## 2024-01-01 RX ORDER — CALCIUM CHLORIDE 100 MG/ML
1 INJECTION INTRAVENOUS; INTRAVENTRICULAR ONCE
Status: COMPLETED | OUTPATIENT
Start: 2024-01-01 | End: 2024-01-01

## 2024-01-01 RX ORDER — HYDROMORPHONE HYDROCHLORIDE 1 MG/ML
0.25 INJECTION, SOLUTION INTRAMUSCULAR; INTRAVENOUS; SUBCUTANEOUS
Status: DISCONTINUED | OUTPATIENT
Start: 2024-01-01 | End: 2024-01-01 | Stop reason: HOSPADM

## 2024-01-01 RX ORDER — SODIUM CHLORIDE 0.9 % (FLUSH) 0.9 %
10 SYRINGE (ML) INJECTION AS NEEDED
Status: DISCONTINUED | OUTPATIENT
Start: 2024-01-01 | End: 2024-01-01 | Stop reason: HOSPADM

## 2024-01-01 RX ORDER — SODIUM CHLORIDE 0.9 % (FLUSH) 0.9 %
10 SYRINGE (ML) INJECTION EVERY 12 HOURS SCHEDULED
Status: DISCONTINUED | OUTPATIENT
Start: 2024-01-01 | End: 2024-01-01 | Stop reason: HOSPADM

## 2024-01-01 RX ORDER — SODIUM CHLORIDE 0.9 % (FLUSH) 0.9 %
20 SYRINGE (ML) INJECTION AS NEEDED
Status: DISCONTINUED | OUTPATIENT
Start: 2024-01-01 | End: 2024-01-01 | Stop reason: HOSPADM

## 2024-01-01 RX ORDER — FENTANYL CITRATE 50 UG/ML
INJECTION, SOLUTION INTRAMUSCULAR; INTRAVENOUS AS NEEDED
Status: DISCONTINUED | OUTPATIENT
Start: 2024-01-01 | End: 2024-01-01 | Stop reason: SURG

## 2024-01-01 RX ORDER — DEXAMETHASONE SODIUM PHOSPHATE 4 MG/ML
INJECTION, SOLUTION INTRA-ARTICULAR; INTRALESIONAL; INTRAMUSCULAR; INTRAVENOUS; SOFT TISSUE AS NEEDED
Status: DISCONTINUED | OUTPATIENT
Start: 2024-01-01 | End: 2024-01-01 | Stop reason: SURG

## 2024-01-01 RX ORDER — SODIUM CHLORIDE 9 MG/ML
40 INJECTION, SOLUTION INTRAVENOUS AS NEEDED
Status: DISCONTINUED | OUTPATIENT
Start: 2024-01-01 | End: 2024-01-01 | Stop reason: HOSPADM

## 2024-01-01 RX ORDER — HEPARIN SODIUM (PORCINE) LOCK FLUSH IV SOLN 100 UNIT/ML 100 UNIT/ML
500 SOLUTION INTRAVENOUS AS NEEDED
Status: CANCELLED | OUTPATIENT
Start: 2024-01-01

## 2024-01-01 RX ORDER — SODIUM CHLORIDE, SODIUM LACTATE, POTASSIUM CHLORIDE, CALCIUM CHLORIDE 600; 310; 30; 20 MG/100ML; MG/100ML; MG/100ML; MG/100ML
9 INJECTION, SOLUTION INTRAVENOUS CONTINUOUS PRN
Status: DISCONTINUED | OUTPATIENT
Start: 2024-01-01 | End: 2024-01-01 | Stop reason: HOSPADM

## 2024-01-01 RX ORDER — SODIUM CHLORIDE 9 MG/ML
20 INJECTION, SOLUTION INTRAVENOUS ONCE
Status: COMPLETED | OUTPATIENT
Start: 2024-01-01 | End: 2024-01-01

## 2024-01-01 RX ORDER — SODIUM CHLORIDE, SODIUM LACTATE, POTASSIUM CHLORIDE, CALCIUM CHLORIDE 600; 310; 30; 20 MG/100ML; MG/100ML; MG/100ML; MG/100ML
INJECTION, SOLUTION INTRAVENOUS CONTINUOUS PRN
Status: DISCONTINUED | OUTPATIENT
Start: 2024-01-01 | End: 2024-01-01 | Stop reason: SURG

## 2024-01-01 RX ORDER — PROPOFOL 10 MG/ML
VIAL (ML) INTRAVENOUS AS NEEDED
Status: DISCONTINUED | OUTPATIENT
Start: 2024-01-01 | End: 2024-01-01 | Stop reason: SURG

## 2024-01-01 RX ORDER — ONDANSETRON 2 MG/ML
4 INJECTION INTRAMUSCULAR; INTRAVENOUS ONCE AS NEEDED
Status: DISCONTINUED | OUTPATIENT
Start: 2024-01-01 | End: 2024-01-01 | Stop reason: HOSPADM

## 2024-01-01 RX ORDER — SODIUM CHLORIDE 0.9 % (FLUSH) 0.9 %
10 SYRINGE (ML) INJECTION AS NEEDED
Status: CANCELLED | OUTPATIENT
Start: 2024-01-01

## 2024-01-01 RX ORDER — PANTOPRAZOLE SODIUM 40 MG/10ML
40 INJECTION, POWDER, LYOPHILIZED, FOR SOLUTION INTRAVENOUS
Status: DISCONTINUED | OUTPATIENT
Start: 2024-01-01 | End: 2024-01-01 | Stop reason: HOSPADM

## 2024-01-01 RX ORDER — HYDROCODONE BITARTRATE AND ACETAMINOPHEN 5; 325 MG/1; MG/1
1 TABLET ORAL EVERY 6 HOURS PRN
Qty: 6 TABLET | Refills: 0 | Status: SHIPPED | OUTPATIENT
Start: 2024-01-01 | End: 2024-12-09

## 2024-01-01 RX ORDER — MEPERIDINE HYDROCHLORIDE 25 MG/ML
12.5 INJECTION INTRAMUSCULAR; INTRAVENOUS; SUBCUTANEOUS
Status: DISCONTINUED | OUTPATIENT
Start: 2024-01-01 | End: 2024-01-01 | Stop reason: HOSPADM

## 2024-01-01 RX ORDER — SODIUM CHLORIDE 9 MG/ML
40 INJECTION, SOLUTION INTRAVENOUS AS NEEDED
Status: CANCELLED | OUTPATIENT
Start: 2024-01-01

## 2024-01-01 RX ORDER — NOREPINEPHRINE BITARTRATE 0.03 MG/ML
.02-.3 INJECTION, SOLUTION INTRAVENOUS
Status: DISCONTINUED | OUTPATIENT
Start: 2024-01-01 | End: 2024-01-01 | Stop reason: HOSPADM

## 2024-01-01 RX ORDER — HYDROMORPHONE HYDROCHLORIDE 1 MG/ML
0.5 INJECTION, SOLUTION INTRAMUSCULAR; INTRAVENOUS; SUBCUTANEOUS ONCE
Status: DISCONTINUED | OUTPATIENT
Start: 2024-01-01 | End: 2024-01-01 | Stop reason: HOSPADM

## 2024-01-01 RX ORDER — NOREPINEPHRINE BITARTRATE 0.03 MG/ML
INJECTION, SOLUTION INTRAVENOUS
Status: COMPLETED
Start: 2024-01-01 | End: 2024-01-01

## 2024-01-01 RX ORDER — SODIUM CHLORIDE 0.9 % (FLUSH) 0.9 %
10 SYRINGE (ML) INJECTION EVERY 12 HOURS SCHEDULED
Status: CANCELLED | OUTPATIENT
Start: 2024-01-01

## 2024-01-01 RX ORDER — ONDANSETRON 2 MG/ML
4 INJECTION INTRAMUSCULAR; INTRAVENOUS ONCE
Status: DISCONTINUED | OUTPATIENT
Start: 2024-01-01 | End: 2024-01-01 | Stop reason: HOSPADM

## 2024-01-01 RX ORDER — HEPARIN SODIUM (PORCINE) LOCK FLUSH IV SOLN 100 UNIT/ML 100 UNIT/ML
500 SOLUTION INTRAVENOUS AS NEEDED
Status: DISCONTINUED | OUTPATIENT
Start: 2024-01-01 | End: 2024-01-01 | Stop reason: HOSPADM

## 2024-01-01 RX ORDER — LIDOCAINE HYDROCHLORIDE 20 MG/ML
INJECTION, SOLUTION EPIDURAL; INFILTRATION; INTRACAUDAL; PERINEURAL AS NEEDED
Status: DISCONTINUED | OUTPATIENT
Start: 2024-01-01 | End: 2024-01-01 | Stop reason: SURG

## 2024-01-01 RX ORDER — ACETAMINOPHEN 650 MG/1
650 SUPPOSITORY RECTAL EVERY 4 HOURS PRN
Status: DISCONTINUED | OUTPATIENT
Start: 2024-01-01 | End: 2024-01-01 | Stop reason: HOSPADM

## 2024-01-01 RX ORDER — ACETAMINOPHEN 325 MG/1
650 TABLET ORAL EVERY 4 HOURS PRN
Status: DISCONTINUED | OUTPATIENT
Start: 2024-01-01 | End: 2024-01-01 | Stop reason: HOSPADM

## 2024-01-01 RX ORDER — SODIUM CHLORIDE 0.9 % (FLUSH) 0.9 %
20 SYRINGE (ML) INJECTION AS NEEDED
Status: CANCELLED | OUTPATIENT
Start: 2024-01-01

## 2024-01-01 RX ORDER — SODIUM CHLORIDE 9 MG/ML
20 INJECTION, SOLUTION INTRAVENOUS ONCE
Status: CANCELLED | OUTPATIENT
Start: 2024-01-01

## 2024-01-01 RX ORDER — ONDANSETRON 2 MG/ML
4 INJECTION INTRAMUSCULAR; INTRAVENOUS EVERY 6 HOURS PRN
Status: DISCONTINUED | OUTPATIENT
Start: 2024-01-01 | End: 2024-01-01 | Stop reason: HOSPADM

## 2024-01-01 RX ORDER — OXYCODONE HYDROCHLORIDE 5 MG/1
5 TABLET ORAL
Status: DISCONTINUED | OUTPATIENT
Start: 2024-01-01 | End: 2024-01-01 | Stop reason: HOSPADM

## 2024-01-01 RX ORDER — AMLODIPINE BESYLATE 2.5 MG/1
1 TABLET ORAL DAILY
COMMUNITY
Start: 2024-01-01

## 2024-01-01 RX ORDER — BISACODYL 10 MG
10 SUPPOSITORY, RECTAL RECTAL DAILY PRN
Status: DISCONTINUED | OUTPATIENT
Start: 2024-01-01 | End: 2024-01-01 | Stop reason: HOSPADM

## 2024-01-01 RX ORDER — AMOXICILLIN 250 MG
2 CAPSULE ORAL 2 TIMES DAILY
Status: DISCONTINUED | OUTPATIENT
Start: 2024-01-01 | End: 2024-01-01 | Stop reason: HOSPADM

## 2024-01-01 RX ORDER — DEXTROSE MONOHYDRATE 25 G/50ML
25 INJECTION, SOLUTION INTRAVENOUS ONCE
Status: COMPLETED | OUTPATIENT
Start: 2024-01-01 | End: 2024-01-01

## 2024-01-01 RX ORDER — POLYETHYLENE GLYCOL 3350 17 G/17G
17 POWDER, FOR SOLUTION ORAL DAILY PRN
Status: DISCONTINUED | OUTPATIENT
Start: 2024-01-01 | End: 2024-01-01 | Stop reason: HOSPADM

## 2024-01-01 RX ORDER — PROMETHAZINE HYDROCHLORIDE 12.5 MG/1
25 TABLET ORAL ONCE AS NEEDED
Status: DISCONTINUED | OUTPATIENT
Start: 2024-01-01 | End: 2024-01-01 | Stop reason: HOSPADM

## 2024-01-01 RX ORDER — BUPIVACAINE HYDROCHLORIDE 2.5 MG/ML
INJECTION, SOLUTION EPIDURAL; INFILTRATION; INTRACAUDAL AS NEEDED
Status: DISCONTINUED | OUTPATIENT
Start: 2024-01-01 | End: 2024-01-01 | Stop reason: HOSPADM

## 2024-01-01 RX ORDER — ONDANSETRON 2 MG/ML
INJECTION INTRAMUSCULAR; INTRAVENOUS AS NEEDED
Status: DISCONTINUED | OUTPATIENT
Start: 2024-01-01 | End: 2024-01-01 | Stop reason: SURG

## 2024-01-01 RX ORDER — PROMETHAZINE HYDROCHLORIDE 25 MG/1
25 SUPPOSITORY RECTAL ONCE AS NEEDED
Status: DISCONTINUED | OUTPATIENT
Start: 2024-01-01 | End: 2024-01-01 | Stop reason: HOSPADM

## 2024-01-01 RX ORDER — BISACODYL 5 MG/1
5 TABLET, DELAYED RELEASE ORAL DAILY PRN
Status: DISCONTINUED | OUTPATIENT
Start: 2024-01-01 | End: 2024-01-01 | Stop reason: HOSPADM

## 2024-01-01 RX ORDER — CALCIUM CHLORIDE 100 MG/ML
INJECTION INTRAVENOUS; INTRAVENTRICULAR
Status: COMPLETED
Start: 2024-01-01 | End: 2024-01-01

## 2024-01-01 RX ORDER — ACETAMINOPHEN 500 MG
1000 TABLET ORAL ONCE
Status: COMPLETED | OUTPATIENT
Start: 2024-01-01 | End: 2024-01-01

## 2024-01-01 RX ORDER — NITROGLYCERIN 0.4 MG/1
0.4 TABLET SUBLINGUAL
Status: DISCONTINUED | OUTPATIENT
Start: 2024-01-01 | End: 2024-01-01 | Stop reason: HOSPADM

## 2024-01-01 RX ORDER — GLYCOPYRROLATE 0.2 MG/ML
INJECTION INTRAMUSCULAR; INTRAVENOUS AS NEEDED
Status: DISCONTINUED | OUTPATIENT
Start: 2024-01-01 | End: 2024-01-01 | Stop reason: SURG

## 2024-01-01 RX ADMIN — HEPARIN 500 UNITS: 100 SYRINGE at 12:59

## 2024-01-01 RX ADMIN — Medication 20 ML: at 12:59

## 2024-01-01 RX ADMIN — FLUDEOXYGLUCOSE F 18 1 DOSE: 200 INJECTION, SOLUTION INTRAVENOUS at 12:08

## 2024-01-01 RX ADMIN — DEXTROSE MONOHYDRATE 25 G: 25 INJECTION, SOLUTION INTRAVENOUS at 00:24

## 2024-01-01 RX ADMIN — SODIUM CHLORIDE 1000 ML: 9 INJECTION, SOLUTION INTRAVENOUS at 23:45

## 2024-01-01 RX ADMIN — ATROPINE SULFATE 1 MG: 0.1 INJECTION INTRAVENOUS at 01:08

## 2024-01-01 RX ADMIN — CALCIUM CHLORIDE 1 G: 100 INJECTION INTRAVENOUS; INTRAVENTRICULAR at 01:08

## 2024-01-01 RX ADMIN — SODIUM CHLORIDE 1000 ML: 9 INJECTION, SOLUTION INTRAVENOUS at 00:38

## 2024-01-01 RX ADMIN — SODIUM CHLORIDE 70 MG: 9 INJECTION, SOLUTION INTRAVENOUS at 10:15

## 2024-01-01 RX ADMIN — Medication 0.02 MCG/KG/MIN: at 01:15

## 2024-01-01 RX ADMIN — FENTANYL CITRATE 50 MCG: 50 INJECTION, SOLUTION INTRAMUSCULAR; INTRAVENOUS at 07:22

## 2024-01-01 RX ADMIN — PROPOFOL 150 MG: 10 INJECTION, EMULSION INTRAVENOUS at 07:21

## 2024-01-01 RX ADMIN — SODIUM CHLORIDE, POTASSIUM CHLORIDE, SODIUM LACTATE AND CALCIUM CHLORIDE: 600; 310; 30; 20 INJECTION, SOLUTION INTRAVENOUS at 07:16

## 2024-01-01 RX ADMIN — ACETAMINOPHEN 1000 MG: 500 TABLET ORAL at 06:48

## 2024-01-01 RX ADMIN — ONDANSETRON HYDROCHLORIDE 4 MG: 2 SOLUTION INTRAMUSCULAR; INTRAVENOUS at 07:47

## 2024-01-01 RX ADMIN — PROPOFOL 10 MCG/KG/MIN: 10 INJECTION, EMULSION INTRAVENOUS at 00:00

## 2024-01-01 RX ADMIN — NOREPINEPHRINE BITARTRATE 0.1 MCG/KG/MIN: 0.03 INJECTION, SOLUTION INTRAVENOUS at 00:47

## 2024-01-01 RX ADMIN — LIDOCAINE HYDROCHLORIDE 30 MG: 20 INJECTION, SOLUTION INTRAVENOUS at 07:21

## 2024-01-01 RX ADMIN — VANCOMYCIN HYDROCHLORIDE 1250 MG: 5 INJECTION, POWDER, LYOPHILIZED, FOR SOLUTION INTRAVENOUS at 02:00

## 2024-01-01 RX ADMIN — CALCIUM CHLORIDE INJECTION 1 G: 100 INJECTION, SOLUTION INTRAVENOUS at 01:08

## 2024-01-01 RX ADMIN — SODIUM CHLORIDE 200 MG: 9 INJECTION, SOLUTION INTRAVENOUS at 11:11

## 2024-01-01 RX ADMIN — SODIUM CHLORIDE 20 ML/HR: 9 INJECTION, SOLUTION INTRAVENOUS at 10:15

## 2024-01-01 RX ADMIN — EPHEDRINE SULFATE 10 MG: 50 INJECTION INTRAVENOUS at 07:54

## 2024-01-01 RX ADMIN — EPHEDRINE SULFATE 10 MG: 50 INJECTION INTRAVENOUS at 07:28

## 2024-01-01 RX ADMIN — SODIUM CHLORIDE, POTASSIUM CHLORIDE, SODIUM LACTATE AND CALCIUM CHLORIDE 9 ML/HR: 600; 310; 30; 20 INJECTION, SOLUTION INTRAVENOUS at 06:50

## 2024-01-01 RX ADMIN — SODIUM BICARBONATE 150 MEQ: 84 INJECTION INTRAVENOUS at 00:50

## 2024-01-01 RX ADMIN — INSULIN HUMAN 10 UNITS: 100 INJECTION, SOLUTION PARENTERAL at 00:21

## 2024-01-01 RX ADMIN — PIPERACILLIN AND TAZOBACTAM 3.38 G: 3; .375 INJECTION, POWDER, FOR SOLUTION INTRAVENOUS at 01:39

## 2024-01-01 RX ADMIN — EPHEDRINE SULFATE 20 MG: 50 INJECTION INTRAVENOUS at 07:25

## 2024-01-01 RX ADMIN — DEXAMETHASONE SODIUM PHOSPHATE 4 MG: 4 INJECTION, SOLUTION INTRAMUSCULAR; INTRAVENOUS at 07:47

## 2024-01-01 RX ADMIN — GLYCOPYRROLATE 0.2 MG: 0.2 INJECTION INTRAMUSCULAR; INTRAVENOUS at 07:27

## 2024-06-14 ENCOUNTER — OFFICE VISIT (OUTPATIENT)
Dept: CARDIOLOGY | Facility: CLINIC | Age: 79
End: 2024-06-14
Payer: MEDICARE

## 2024-06-14 VITALS
BODY MASS INDEX: 24.17 KG/M2 | HEIGHT: 67 IN | WEIGHT: 154 LBS | DIASTOLIC BLOOD PRESSURE: 66 MMHG | SYSTOLIC BLOOD PRESSURE: 116 MMHG | HEART RATE: 50 BPM

## 2024-06-14 DIAGNOSIS — E03.9 HYPOTHYROIDISM (ACQUIRED): ICD-10-CM

## 2024-06-14 DIAGNOSIS — I10 HYPERTENSION, ESSENTIAL: ICD-10-CM

## 2024-06-14 DIAGNOSIS — E78.2 HYPERLIPEMIA, MIXED: Primary | ICD-10-CM

## 2024-06-14 PROCEDURE — 1159F MED LIST DOCD IN RCRD: CPT | Performed by: SPECIALIST

## 2024-06-14 PROCEDURE — 99214 OFFICE O/P EST MOD 30 MIN: CPT | Performed by: SPECIALIST

## 2024-06-14 PROCEDURE — 1160F RVW MEDS BY RX/DR IN RCRD: CPT | Performed by: SPECIALIST

## 2024-11-18 ENCOUNTER — HOSPITAL ENCOUNTER (INPATIENT)
Facility: HOSPITAL | Age: 79
LOS: 3 days | Discharge: HOME | DRG: 064 | End: 2024-11-21
Attending: EMERGENCY MEDICINE | Admitting: INTERNAL MEDICINE
Payer: MEDICARE

## 2024-11-18 ENCOUNTER — TRANSCRIBE ORDERS (OUTPATIENT)
Dept: LAB | Facility: HOSPITAL | Age: 79
End: 2024-11-18
Payer: MEDICARE

## 2024-11-18 ENCOUNTER — TRANSCRIBE ORDERS (OUTPATIENT)
Dept: ADMINISTRATIVE | Facility: HOSPITAL | Age: 79
End: 2024-11-18
Payer: MEDICARE

## 2024-11-18 ENCOUNTER — HOSPITAL ENCOUNTER (OUTPATIENT)
Dept: CT IMAGING | Facility: HOSPITAL | Age: 79
Discharge: HOME | End: 2024-11-18
Admitting: FAMILY MEDICINE
Payer: MEDICARE

## 2024-11-18 ENCOUNTER — APPOINTMENT (OUTPATIENT)
Dept: CT IMAGING | Facility: HOSPITAL | Age: 79
DRG: 064 | End: 2024-11-18
Payer: MEDICARE

## 2024-11-18 DIAGNOSIS — R51.9 ACUTE INTRACTABLE HEADACHE, UNSPECIFIED HEADACHE TYPE: ICD-10-CM

## 2024-11-18 DIAGNOSIS — I63.9 CEREBELLAR INFARCTION: Primary | ICD-10-CM

## 2024-11-18 DIAGNOSIS — I63.9 CEREBELLAR INFARCTION: ICD-10-CM

## 2024-11-18 DIAGNOSIS — G93.6 CEREBRAL EDEMA: ICD-10-CM

## 2024-11-18 DIAGNOSIS — I62.9 INTRACRANIAL HEMORRHAGE: Primary | ICD-10-CM

## 2024-11-18 LAB
ALBUMIN SERPL-MCNC: 3.9 G/DL (ref 3.5–5.2)
ALBUMIN/GLOB SERPL: 1.2 G/DL
ALP SERPL-CCNC: 174 U/L (ref 39–117)
ALT SERPL W P-5'-P-CCNC: 17 U/L (ref 1–41)
ANION GAP SERPL CALCULATED.3IONS-SCNC: 11.6 MMOL/L (ref 5–15)
APTT PPP: 46.7 SECONDS (ref 22.7–35.4)
AST SERPL-CCNC: 22 U/L (ref 1–40)
BASOPHILS # BLD AUTO: 0.05 10*3/MM3 (ref 0–0.2)
BASOPHILS NFR BLD AUTO: 0.7 % (ref 0–1.5)
BILIRUB SERPL-MCNC: 2.2 MG/DL (ref 0–1.2)
BUN SERPL-MCNC: 18 MG/DL (ref 8–23)
BUN/CREAT SERPL: 13.1 (ref 7–25)
CALCIUM SPEC-SCNC: 9.4 MG/DL (ref 8.6–10.5)
CHLORIDE SERPL-SCNC: 102 MMOL/L (ref 98–107)
CO2 SERPL-SCNC: 22.4 MMOL/L (ref 22–29)
CREAT SERPL-MCNC: 1.37 MG/DL (ref 0.76–1.27)
DEPRECATED RDW RBC AUTO: 39.7 FL (ref 37–54)
EGFRCR SERPLBLD CKD-EPI 2021: 52.8 ML/MIN/1.73
EOSINOPHIL # BLD AUTO: 0.03 10*3/MM3 (ref 0–0.4)
EOSINOPHIL NFR BLD AUTO: 0.4 % (ref 0.3–6.2)
ERYTHROCYTE [DISTWIDTH] IN BLOOD BY AUTOMATED COUNT: 11.7 % (ref 12.3–15.4)
GLOBULIN UR ELPH-MCNC: 3.3 GM/DL
GLUCOSE BLDC GLUCOMTR-MCNC: 84 MG/DL (ref 70–130)
GLUCOSE SERPL-MCNC: 92 MG/DL (ref 65–99)
HCT VFR BLD AUTO: 40.9 % (ref 37.5–51)
HGB BLD-MCNC: 14 G/DL (ref 13–17.7)
IMM GRANULOCYTES # BLD AUTO: 0.02 10*3/MM3 (ref 0–0.05)
IMM GRANULOCYTES NFR BLD AUTO: 0.3 % (ref 0–0.5)
INR PPP: 1.11 (ref 0.9–1.1)
LYMPHOCYTES # BLD AUTO: 1.22 10*3/MM3 (ref 0.7–3.1)
LYMPHOCYTES NFR BLD AUTO: 17.3 % (ref 19.6–45.3)
MCH RBC QN AUTO: 32 PG (ref 26.6–33)
MCHC RBC AUTO-ENTMCNC: 34.2 G/DL (ref 31.5–35.7)
MCV RBC AUTO: 93.4 FL (ref 79–97)
MONOCYTES # BLD AUTO: 0.66 10*3/MM3 (ref 0.1–0.9)
MONOCYTES NFR BLD AUTO: 9.4 % (ref 5–12)
NEUTROPHILS NFR BLD AUTO: 5.06 10*3/MM3 (ref 1.7–7)
NEUTROPHILS NFR BLD AUTO: 71.9 % (ref 42.7–76)
PLATELET # BLD AUTO: 179 10*3/MM3 (ref 140–450)
PMV BLD AUTO: 13.2 FL (ref 6–12)
POTASSIUM SERPL-SCNC: 4.7 MMOL/L (ref 3.5–5.2)
PROT SERPL-MCNC: 7.2 G/DL (ref 6–8.5)
PROTHROMBIN TIME: 14.5 SECONDS (ref 11.7–14.2)
QT INTERVAL: 459 MS
QTC INTERVAL: 423 MS
RBC # BLD AUTO: 4.38 10*6/MM3 (ref 4.14–5.8)
SODIUM SERPL-SCNC: 136 MMOL/L (ref 136–145)
WBC NRBC COR # BLD AUTO: 7.04 10*3/MM3 (ref 3.4–10.8)

## 2024-11-18 PROCEDURE — 70496 CT ANGIOGRAPHY HEAD: CPT

## 2024-11-18 PROCEDURE — 85610 PROTHROMBIN TIME: CPT | Performed by: EMERGENCY MEDICINE

## 2024-11-18 PROCEDURE — 25510000001 IOPAMIDOL PER 1 ML: Performed by: INTERNAL MEDICINE

## 2024-11-18 PROCEDURE — 85025 COMPLETE CBC W/AUTO DIFF WBC: CPT | Performed by: EMERGENCY MEDICINE

## 2024-11-18 PROCEDURE — 80053 COMPREHEN METABOLIC PANEL: CPT | Performed by: EMERGENCY MEDICINE

## 2024-11-18 PROCEDURE — 93010 ELECTROCARDIOGRAM REPORT: CPT | Performed by: INTERNAL MEDICINE

## 2024-11-18 PROCEDURE — 99291 CRITICAL CARE FIRST HOUR: CPT

## 2024-11-18 PROCEDURE — 70450 CT HEAD/BRAIN W/O DYE: CPT

## 2024-11-18 PROCEDURE — 70498 CT ANGIOGRAPHY NECK: CPT

## 2024-11-18 PROCEDURE — 82948 REAGENT STRIP/BLOOD GLUCOSE: CPT

## 2024-11-18 PROCEDURE — 36415 COLL VENOUS BLD VENIPUNCTURE: CPT

## 2024-11-18 PROCEDURE — 85730 THROMBOPLASTIN TIME PARTIAL: CPT | Performed by: EMERGENCY MEDICINE

## 2024-11-18 PROCEDURE — 93005 ELECTROCARDIOGRAM TRACING: CPT | Performed by: EMERGENCY MEDICINE

## 2024-11-18 RX ORDER — DEXAMETHASONE SODIUM PHOSPHATE 4 MG/ML
4 INJECTION, SOLUTION INTRA-ARTICULAR; INTRALESIONAL; INTRAMUSCULAR; INTRAVENOUS; SOFT TISSUE EVERY 6 HOURS
Status: DISCONTINUED | OUTPATIENT
Start: 2024-11-18 | End: 2024-11-20

## 2024-11-18 RX ORDER — BISACODYL 10 MG
10 SUPPOSITORY, RECTAL RECTAL DAILY PRN
Status: DISCONTINUED | OUTPATIENT
Start: 2024-11-18 | End: 2024-11-21 | Stop reason: HOSPADM

## 2024-11-18 RX ORDER — IOPAMIDOL 755 MG/ML
95 INJECTION, SOLUTION INTRAVASCULAR
Status: COMPLETED | OUTPATIENT
Start: 2024-11-18 | End: 2024-11-18

## 2024-11-18 RX ORDER — POLYETHYLENE GLYCOL 3350 17 G/17G
17 POWDER, FOR SOLUTION ORAL DAILY PRN
Status: DISCONTINUED | OUTPATIENT
Start: 2024-11-18 | End: 2024-11-21 | Stop reason: HOSPADM

## 2024-11-18 RX ORDER — HYDRALAZINE HYDROCHLORIDE 20 MG/ML
10 INJECTION INTRAMUSCULAR; INTRAVENOUS EVERY 4 HOURS PRN
Status: DISCONTINUED | OUTPATIENT
Start: 2024-11-18 | End: 2024-11-21 | Stop reason: HOSPADM

## 2024-11-18 RX ORDER — SODIUM CHLORIDE 0.9 % (FLUSH) 0.9 %
10 SYRINGE (ML) INJECTION AS NEEDED
Status: DISCONTINUED | OUTPATIENT
Start: 2024-11-18 | End: 2024-11-21 | Stop reason: HOSPADM

## 2024-11-18 RX ORDER — SODIUM CHLORIDE 0.9 % (FLUSH) 0.9 %
10 SYRINGE (ML) INJECTION EVERY 12 HOURS SCHEDULED
Status: DISCONTINUED | OUTPATIENT
Start: 2024-11-18 | End: 2024-11-21 | Stop reason: HOSPADM

## 2024-11-18 RX ORDER — BISACODYL 5 MG/1
5 TABLET, DELAYED RELEASE ORAL DAILY PRN
Status: DISCONTINUED | OUTPATIENT
Start: 2024-11-18 | End: 2024-11-21 | Stop reason: HOSPADM

## 2024-11-18 RX ORDER — NITROGLYCERIN 0.4 MG/1
0.4 TABLET SUBLINGUAL
Status: DISCONTINUED | OUTPATIENT
Start: 2024-11-18 | End: 2024-11-21 | Stop reason: HOSPADM

## 2024-11-18 RX ORDER — LEVOTHYROXINE SODIUM 50 UG/1
50 TABLET ORAL DAILY
Status: DISCONTINUED | OUTPATIENT
Start: 2024-11-19 | End: 2024-11-21 | Stop reason: HOSPADM

## 2024-11-18 RX ORDER — ONDANSETRON 2 MG/ML
4 INJECTION INTRAMUSCULAR; INTRAVENOUS EVERY 6 HOURS PRN
Status: DISCONTINUED | OUTPATIENT
Start: 2024-11-18 | End: 2024-11-21 | Stop reason: HOSPADM

## 2024-11-18 RX ORDER — SODIUM CHLORIDE 9 MG/ML
40 INJECTION, SOLUTION INTRAVENOUS AS NEEDED
Status: DISCONTINUED | OUTPATIENT
Start: 2024-11-18 | End: 2024-11-21 | Stop reason: HOSPADM

## 2024-11-18 RX ORDER — AMOXICILLIN 250 MG
2 CAPSULE ORAL 2 TIMES DAILY
Status: DISCONTINUED | OUTPATIENT
Start: 2024-11-18 | End: 2024-11-21 | Stop reason: HOSPADM

## 2024-11-18 RX ADMIN — IOPAMIDOL 95 ML: 755 INJECTION, SOLUTION INTRAVENOUS at 20:44

## 2024-11-18 NOTE — ED NOTES
Patient arrives via [facility] from home for complaints of abnormal CT scan. Patient has had a headache x5 days. Patient's PCP ordered a CT scan and it showed 4 bleeds. No thinners. Alert and oriented x4.

## 2024-11-18 NOTE — ED PROVIDER NOTES
EMERGENCY DEPARTMENT ENCOUNTER    Room Number:  I386/1  PCP: Law Cole MD  Independent Historians: Patient    HPI:  Chief Complaint: had concerns including Abnormal Imaging and Headache.      A complete HPI/ROS/PMH/PSH/SH/FH are unobtainable due to: None    Chronic or social conditions impacting patient care (Social Determinants of Health): None      Context: Leon Lemon is a 79 y.o. male with a medical history of hypertension, hyperlipidemia, hypothyroidism who presents to the ED c/o acute headache since Wednesday sent in by pmd with abnl ct head.        Review of prior external notes (non-ED) -and- Review of prior external test results outside of this encounter:   CT head from today  Findings:  Patient has 3 acute intraparenchymal hemorrhages involving the inferolateral left temporal lobe, lateral left frontal lobe, superior left frontal parietal lobe. There is associated edema surrounding these hemorrhages. The ventricles are normal in size   and configuration. There is also acute intraparenchymal hemorrhage in the frontal horn of the right lateral ventricle.     No calvarial fracture is seen. Visualized extracranial soft tissues appear normal.     IMPRESSION:  Impression:  1.Acute intraparenchymal hemorrhage is noted in the left frontal, left temporal and left frontal parietal lobes. Findings may be secondary to hemorrhagic conversion of infarctions, amyloid angiopathy, hypertensive hemorrhages, hemorrhagic metastases,   previous trauma and bleeding diathesis.  2.Acute hemorrhage in the right lateral ventricle.  3.MRI is suggested to further evaluate.     I called results to Dr. Law Cole at the time of dictation.     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     Resolved Ambulatory Problems     Diagnosis Date Noted    No Resolved Ambulatory Problems     Past Medical History:   Diagnosis Date    Arthritis     Bradycardia     Cancer     High blood pressure     High cholesterol          PAST SURGICAL HISTORY  Past Surgical History:   Procedure Laterality Date    SKIN CANCER EXCISION      Melanoma excision         FAMILY HISTORY  Family History   Problem Relation Age of Onset    Cancer Brother     No Known Problems Mother     No Known Problems Father          SOCIAL HISTORY  Social History     Socioeconomic History    Marital status:    Tobacco Use    Smoking status: Never    Smokeless tobacco: Former     Types: Snuff   Vaping Use    Vaping status: Never Used   Substance and Sexual Activity    Alcohol use: Not Currently    Drug use: Never    Sexual activity: Not Currently     Partners: Female         ALLERGIES  Patient has no known allergies.        REVIEW OF SYSTEMS  Review of Systems  Included in HPI  All systems reviewed and negative except for those discussed in HPI.      PHYSICAL EXAM    I have reviewed the triage vital signs and nursing notes.    ED Triage Vitals   Temp Heart Rate Resp BP SpO2   11/18/24 1747 11/18/24 1745 11/18/24 1745 11/18/24 1745 11/18/24 1745   96.8 °F (36 °C) 52 20 153/81 97 %      Temp src Heart Rate Source Patient Position BP Location FiO2 (%)   -- -- -- -- --              Physical Exam  GENERAL: Pleasant cooperative and conversant male, alert, no acute distress  SKIN: Warm, dry  HENT: Normocephalic, atraumatic  EYES: no scleral icterus  CV: regular rhythm, regular rate  RESPIRATORY: normal effort, lungs clear, no wheezing  ABDOMEN: soft, nontender, nondistended  MUSCULOSKELETAL: no deformity  NEURO: alert, moves all extremities, follows commands, speech clear and fluent, face symmetric, no pronator drift, sensation to light touch equal throughout all 4 extremities      NIH:0  Interval:  baseline  1a. Level of Consciousness: 0-->Alert, keenly responsive  1b. LOC Questions: 0-->Answers both questions correctly  1c. LOC Commands: 0-->Performs both tasks correctly  2. Best Gaze: 0-->Normal  3. Visual: 0-->No visual loss  4. Facial Palsy: 0-->Normal symmetrical movements  5a. Motor Arm, Left: 0-->No drift, limb holds 90 (or 45) degrees for full 10 secs  5b. Motor Arm, Right: 0-->No drift, limb holds 90 (or 45) degrees for full 10 secs  6a. Motor Leg, Left: 0-->No drift, leg holds 30 degree position for full 5 secs  6b. Motor Leg, Right: 0-->No drift, leg holds 30 degree position for full 5 secs  7. Limb Ataxia: 0-->Absent  8. Sensory: 0-->Normal, no sensory loss  9. Best Language: 0-->No aphasia, normal  10. Dysarthria: 0-->Normal  11. Extinction and Inattention (formerly Neglect): 0-->No abnormality    Total (NIH Stroke Scale): 0        LAB RESULTS  Recent Results (from the past 24 hours)   Comprehensive Metabolic Panel    Collection Time: 11/18/24  6:45 PM    Specimen: Arm, Left; Blood   Result Value Ref Range    Glucose 92 65 - 99 mg/dL    BUN 18 8 - 23 mg/dL    Creatinine 1.37 (H) 0.76 - 1.27 mg/dL    Sodium 136 136 - 145 mmol/L    Potassium 4.7 3.5 - 5.2 mmol/L    Chloride 102 98 - 107 mmol/L    CO2 22.4 22.0 - 29.0 mmol/L    Calcium 9.4 8.6 - 10.5 mg/dL    Total Protein 7.2 6.0 - 8.5 g/dL    Albumin 3.9 3.5 - 5.2 g/dL    ALT (SGPT) 17 1 - 41 U/L    AST (SGOT) 22 1 - 40 U/L    Alkaline Phosphatase 174 (H) 39 - 117 U/L    Total Bilirubin 2.2 (H) 0.0 - 1.2 mg/dL    Globulin 3.3 gm/dL    A/G Ratio 1.2 g/dL    BUN/Creatinine Ratio 13.1 7.0 - 25.0    Anion Gap 11.6 5.0 - 15.0 mmol/L    eGFR 52.8 (L) >60.0 mL/min/1.73   Protime-INR    Collection Time: 11/18/24  6:45 PM    Specimen: Arm, Left; Blood   Result Value Ref Range    Protime 14.5 (H) 11.7 - 14.2 Seconds    INR 1.11 (H) 0.90 - 1.10   aPTT    Collection Time: 11/18/24  6:45 PM    Specimen: Arm, Left; Blood   Result Value Ref Range    PTT 46.7 (H)  22.7 - 35.4 seconds   CBC Auto Differential    Collection Time: 11/18/24  6:45 PM    Specimen: Arm, Left; Blood   Result Value Ref Range    WBC 7.04 3.40 - 10.80 10*3/mm3    RBC 4.38 4.14 - 5.80 10*6/mm3    Hemoglobin 14.0 13.0 - 17.7 g/dL    Hematocrit 40.9 37.5 - 51.0 %    MCV 93.4 79.0 - 97.0 fL    MCH 32.0 26.6 - 33.0 pg    MCHC 34.2 31.5 - 35.7 g/dL    RDW 11.7 (L) 12.3 - 15.4 %    RDW-SD 39.7 37.0 - 54.0 fl    MPV 13.2 (H) 6.0 - 12.0 fL    Platelets 179 140 - 450 10*3/mm3    Neutrophil % 71.9 42.7 - 76.0 %    Lymphocyte % 17.3 (L) 19.6 - 45.3 %    Monocyte % 9.4 5.0 - 12.0 %    Eosinophil % 0.4 0.3 - 6.2 %    Basophil % 0.7 0.0 - 1.5 %    Immature Grans % 0.3 0.0 - 0.5 %    Neutrophils, Absolute 5.06 1.70 - 7.00 10*3/mm3    Lymphocytes, Absolute 1.22 0.70 - 3.10 10*3/mm3    Monocytes, Absolute 0.66 0.10 - 0.90 10*3/mm3    Eosinophils, Absolute 0.03 0.00 - 0.40 10*3/mm3    Basophils, Absolute 0.05 0.00 - 0.20 10*3/mm3    Immature Grans, Absolute 0.02 0.00 - 0.05 10*3/mm3   ECG 12 Lead Rhythm Change    Collection Time: 11/18/24  7:12 PM   Result Value Ref Range    QT Interval 459 ms    QTC Interval 423 ms   POC Glucose Once    Collection Time: 11/18/24 10:47 PM    Specimen: Blood   Result Value Ref Range    Glucose 84 70 - 130 mg/dL         RADIOLOGY  CT Angiogram Head, CT Angiogram Neck    Result Date: 11/18/2024  CT OF THE BRAIN WITH AND WITHOUT CONTRAST AND CT ANGIOGRAPHY OF THE HEAD AND NECK WITH CONTRAST INCLUDING RECONSTRUCTION IMAGES 11/18/2024  HISTORY: Follow-up intracranial hemorrhage.  TECHNIQUE: Axial images were obtained through the brain without intravenous contrast.  FINDINGS: Previous CT of the brain from earlier today shows at least 3 areas of hemorrhage in the left temporal lobe, left frontal lobe and posterior parietal lobe. These appear unchanged from the previous study. There is surrounding edema around these areas of hemorrhage. Small focus of slightly high attenuation is seen in the  anterior aspect of the head of the caudate nucleus on the right as well. This was also present on the previous study. Following the intravenous contrast injection CT angiography was performed through the head and neck. Sagittal, coronal and 3D reconstruction images were reviewed.  NASCET criteria was used.  There is normal configuration of the aortic arch. Bilateral common carotid arteries appear patent. Mild atherosclerotic disease is seen in both carotid bifurcations but no significant NASCET criteria stenosis is seen. Bilateral internal and external carotid arteries appear patent. Supraclinoid internal carotid artery calcification is seen bilaterally. Bilateral middle and anterior cerebral arteries appear patent.  Bilateral vertebral arteries and the basilar artery and its branches appear patent.  No aneurysm, vascular occlusion or thrombus is seen.  Postcontrast CT of the brain shows enhancement of multiple areas in the brain including areas of hemorrhage seen on the precontrast study as well as some enhancement in the region of the head of the caudate nucleus lesion on the right, small right frontal lesion on postcontrast image 32, peripheral lesion in the right frontal lobe on image 30. These areas of enhancement again demonstrate surrounding edema.      1. No acute process identified on CT angiography of the head and neck with contrast. 2. Areas of hyperdense hemorrhage are seen in the left cerebral hemisphere with another focus of high attenuation in the right caudate head on precontrast images. Multiple additional enhancing lesions are seen following contrast as discussed above. Surrounding edema is seen. These findings are consistent with metastatic disease and some of these appear to have hemorrhaged.  Comment reviewed 3D] Radiation dose reduction techniques were utilized, including automated exposure control and exposure modulation based on body size.       CT Head Without Contrast    Result Date:  11/18/2024  CT HEAD WO CONTRAST Date of Exam: 11/18/2024 3:34 PM EST Indication: CT  BRAIN - POSSIBLE STROKE. Comparison: None available. Technique: Axial CT images were obtained of the head without contrast administration.  Reconstructed coronal and sagittal images were also obtained. Automated exposure control and iterative construction methods were used. Findings: Patient has 3 acute intraparenchymal hemorrhages involving the inferolateral left temporal lobe, lateral left frontal lobe, superior left frontal parietal lobe. There is associated edema surrounding these hemorrhages. The ventricles are normal in size and configuration. There is also acute intraparenchymal hemorrhage in the frontal horn of the right lateral ventricle. No calvarial fracture is seen. Visualized extracranial soft tissues appear normal.     Impression: 1.Acute intraparenchymal hemorrhage is noted in the left frontal, left temporal and left frontal parietal lobes. Findings may be secondary to hemorrhagic conversion of infarctions, amyloid angiopathy, hypertensive hemorrhages, hemorrhagic metastases, previous trauma and bleeding diathesis. 2.Acute hemorrhage in the right lateral ventricle. 3.MRI is suggested to further evaluate. I called results to Dr. Law Cole at the time of dictation. Electronically Signed: Toby Rivas MD  11/18/2024 3:57 PM EST  Workstation ID: [id number]       MEDICATIONS GIVEN IN ER  Medications   sodium chloride 0.9 % flush 10 mL (has no administration in time range)   nitroglycerin (NITROSTAT) SL tablet 0.4 mg (has no administration in time range)   sodium chloride 0.9 % flush 10 mL (has no administration in time range)   sodium chloride 0.9 % flush 10 mL (has no administration in time range)   sodium chloride 0.9 % infusion 40 mL (has no administration in time range)   mupirocin (BACTROBAN) 2 % nasal ointment 1 Application (has no administration in time range)   sennosides-docusate (PERICOLACE) 8.6-50 MG per tablet 2  tablet (has no administration in time range)     And   polyethylene glycol (MIRALAX) packet 17 g (has no administration in time range)     And   bisacodyl (DULCOLAX) EC tablet 5 mg (has no administration in time range)     And   bisacodyl (DULCOLAX) suppository 10 mg (has no administration in time range)   niCARdipine (CARDENE) 25 mg in 250 mL NS infusion kit (has no administration in time range)   hydrALAZINE (APRESOLINE) injection 10 mg (has no administration in time range)   ondansetron (ZOFRAN) injection 4 mg (has no administration in time range)   levothyroxine (SYNTHROID, LEVOTHROID) tablet 50 mcg (has no administration in time range)   dexAMETHasone sodium phosphate injection 4 mg (has no administration in time range)   iopamidol (ISOVUE-370) 76 % injection 95 mL (95 mL Intravenous Given 11/18/24 2044)         ORDERS PLACED DURING THIS VISIT:  Orders Placed This Encounter   Procedures    CT Angiogram Head    CT Angiogram Neck    CT Head Without Contrast    CT Abdomen Pelvis With Contrast    CT Chest With Contrast Diagnostic    Comprehensive Metabolic Panel    Protime-INR    aPTT    CBC Auto Differential    Hemoglobin A1c    Lipid Panel    Basic Metabolic Panel    CBC Auto Differential    NPO Diet NPO Type: Strict NPO    Diet: Regular/House; Fluid Consistency: Thin (IDDSI 0)    Vital Signs Every Hour and Per Hospital Policy Based on Patient Condition    Telemetry - Place Orders & Notify Provider of Results When Patient Experiences Acute Chest Pain, Dysrhythmia or Respiratory Distress    Continuous Pulse Oximetry    Height & Weight    Daily Weights    Intake & Output    Oral Care - Patient Not on NPPV & Not Intubated    Target Arousal Level RASS 0 to -2    Use Mobility Guidelines for Advancement of Activity    Daily CHG Bath While in Critical Care    Notify Provider    Head of Bed    Turn Patient    Nursing Dysphagia Screening (Complete Prior to Giving Anything By Mouth)    RN to Place Order SLP Consult - Eval  & Treat Choosing Reason of RN Dysphagia Screen Failed    Nurse to Call MD or Nutrition Services for Diet if Patient Passes Dysphagia Screen    Intake and Output    Neuro Checks    NIHSS Assessment    Order CT Head Without Contrast for Neurological Decline    Provide Stroke Education Material    Place Sequential Compression Device    Maintain Sequential Compression Device    If Patient has BG Less Than 80 & is Symptomatic But Not on IV Insulin Protocol - Use Adult Hypoglycemia Treatment Orders    Maintain IV Access    ICU / CCU - Place Order Consult Intensivist For Critical Care Management (If Patient Not Admitted to Cardiology for Primary Cardiology Condition)    ICU / CCU - Notify All Physicians When Patient is Transferred    Code Status and Medical Interventions: CPR (Attempt to Resuscitate); Full Support    Neurosurgery (on-call MD unless specified)    Pulmonology (on-call MD unless specified)    Notify Stroke Coordinator    Inpatient Diabetes Educator Consult    Inpatient Neurosurgery Consult    OT Consult: Eval & Treat    PT Consult: Eval & Treat As Tolerated    Oxygen Therapy- Nasal Cannula; Titrate 1-6 LPM Per SpO2; 90 - 95%    SLP Consult: Eval & Treat Communication Disorder    POC Glucose Q6H    POC Glucose Once    ECG 12 Lead Rhythm Change    Insert Peripheral IV    Insert Peripheral IV    Inpatient Admission    CBC & Differential    CBC & Differential         OUTPATIENT MEDICATION MANAGEMENT:  Current Facility-Administered Medications Ordered in Epic   Medication Dose Route Frequency Provider Last Rate Last Admin    sennosides-docusate (PERICOLACE) 8.6-50 MG per tablet 2 tablet  2 tablet Oral BID Rashida Fitch, DOC        And    polyethylene glycol (MIRALAX) packet 17 g  17 g Oral Daily PRN Rashida Fitch, DOC        And    bisacodyl (DULCOLAX) EC tablet 5 mg  5 mg Oral Daily PRN Rashida Fitch, DOC        And    bisacodyl (DULCOLAX) suppository 10 mg  10 mg Rectal Daily PRN Rashida Fitch, APRN         dexAMETHasone sodium phosphate injection 4 mg  4 mg Intravenous Q6H Rashida Fitch APRN        hydrALAZINE (APRESOLINE) injection 10 mg  10 mg Intravenous Q4H PRN Rashida Fitch APRN        [START ON 11/19/2024] levothyroxine (SYNTHROID, LEVOTHROID) tablet 50 mcg  50 mcg Oral Daily Rashida Fitch APRN        mupirocin (BACTROBAN) 2 % nasal ointment 1 Application  1 Application Each Nare BID Rashida Fitch APRN        niCARdipine (CARDENE) 25 mg in 250 mL NS infusion kit  5-15 mg/hr Intravenous Titrated Rashida Fitch APRN        nitroglycerin (NITROSTAT) SL tablet 0.4 mg  0.4 mg Sublingual Q5 Min PRN Rashida Fitch APRN        ondansetron (ZOFRAN) injection 4 mg  4 mg Intravenous Q6H PRN Rashida Fitch APRN        sodium chloride 0.9 % flush 10 mL  10 mL Intravenous PRN Aliya Warner MD        sodium chloride 0.9 % flush 10 mL  10 mL Intravenous Q12H Rashida Fitch APRN        sodium chloride 0.9 % flush 10 mL  10 mL Intravenous PRN Rashida Fitch APRN        sodium chloride 0.9 % infusion 40 mL  40 mL Intravenous PRN Rashida Fitch APRN         No current Epic-ordered outpatient medications on file.         PROCEDURES  Procedures      Critical care provider statement:    Critical care time (minutes): 36.   Critical care time was exclusive of:  Separately billable procedures and treating other patients   Critical care was necessary to treat or prevent imminent or life-threatening deterioration of the following conditions:  CNS Failure   Critical care was time spent personally by me on the following activities:  Development of treatment plan with patient or surrogate, discussions with consultants, evaluation of patient's response to treatment, examination of patient, obtaining history from patient or surrogate, ordering and performing treatments and interventions, ordering and review of laboratory studies, ordering and review of radiographic studies, pulse oximetry, re-evaluation of  patient's condition and review of old charts. Critical Care indicators: Stroke  intracranial hemorrhage requiring close monitoring and specialty consultation as well as ICU care.      PROGRESS, DATA ANALYSIS, CONSULTS, AND MEDICAL DECISION MAKING  All labs have been independently interpreted by me.  All radiology studies have been reviewed by me. All EKG's have been independently viewed and interpreted by me.  Discussion below represents my analysis of pertinent findings related to patient's condition, differential diagnosis, treatment plan and final disposition.    Patient presents with abnormal CT scan.  His CT was significant for areas of hemorrhage.  Possibilities include hemorrhagic conversion of CVA, embolic lesions, amyloid angiopathy, malignancy, among other possibilities    Clinical Scores:              NIH of 1    ED Course as of 11/18/24 2335   Mon Nov 18, 2024   1914 EKG ER MD interpretation   Time: 19: 12  Rhythm and rate: Normal sinus rhythm at a rate of 51  Axis: Normal  P waves: Normal  QRS complexes: Normal  ST segments: no elevation nor depressions  T waves: Flattening V1 through V3   [AR]   1948 I discussed patient's case with neurosurgery on-call.  Plan for CTA head and neck and admission to the ICU. [AR]   2014 I discussed patient's case with Lisa Fitch on 4 pulmonary critical care who is amenable to accepting the patient to the ICU under Dr. Edward [AR]      ED Course User Index  [AR] Aliya Warner MD             AS OF 23:35 EST VITALS:    BP - 130/73  HR - 51  TEMP - 96.8 °F (36 °C)  O2 SATS - 95%      COMPLEXITY OF CARE  The patient requires admission.    DIAGNOSIS  Final diagnoses:   Intracranial hemorrhage   Acute intractable headache, unspecified headache type         DISPOSITION  ED Disposition       ED Disposition   Decision to Admit    Condition   --    Comment   Level of Care: Critical Care [6]   Diagnosis: Intracranial hemorrhage [[id number]]   Admitting Physician: JOANNA BUTTS  [[id number]]   Attending Physician: JOANNA BUTTS [[id number]]   Certification: I Certify That Inpatient Hospital Services Are Medically Necessary For Greater Than 2 Midnights                  Please note that portions of this document were completed with a voice recognition program.    Note Disclaimer: At [facility], we believe that sharing information builds trust and better relationships. You are receiving this note because you recently visited [facility]. It is possible you will see health information before a provider has talked with you about it. This kind of information can be easy to misunderstand. To help you fully understand what it means for your health, we urge you to discuss this note with your provider.         Aliya Warner MD  11/18/24 [id number]

## 2024-11-19 ENCOUNTER — APPOINTMENT (OUTPATIENT)
Dept: CT IMAGING | Facility: HOSPITAL | Age: 79
DRG: 064 | End: 2024-11-19
Payer: MEDICARE

## 2024-11-19 ENCOUNTER — APPOINTMENT (OUTPATIENT)
Dept: MRI IMAGING | Facility: HOSPITAL | Age: 79
DRG: 064 | End: 2024-11-19
Payer: MEDICARE

## 2024-11-19 PROBLEM — Z98.890 HISTORY OF MELANOMA EXCISION: Status: ACTIVE | Noted: 2024-11-19

## 2024-11-19 PROBLEM — Z85.820 HISTORY OF MELANOMA EXCISION: Status: ACTIVE | Noted: 2024-11-19

## 2024-11-19 LAB
ANION GAP SERPL CALCULATED.3IONS-SCNC: 12.9 MMOL/L (ref 5–15)
BASOPHILS # BLD AUTO: 0.03 10*3/MM3 (ref 0–0.2)
BASOPHILS NFR BLD AUTO: 0.4 % (ref 0–1.5)
BUN SERPL-MCNC: 17 MG/DL (ref 8–23)
BUN/CREAT SERPL: 13.8 (ref 7–25)
CALCIUM SPEC-SCNC: 9.1 MG/DL (ref 8.6–10.5)
CHLORIDE SERPL-SCNC: 102 MMOL/L (ref 98–107)
CHOLEST SERPL-MCNC: 144 MG/DL (ref 0–200)
CO2 SERPL-SCNC: 22.1 MMOL/L (ref 22–29)
CREAT SERPL-MCNC: 1.23 MG/DL (ref 0.76–1.27)
DEPRECATED RDW RBC AUTO: 41.2 FL (ref 37–54)
EGFRCR SERPLBLD CKD-EPI 2021: 60.1 ML/MIN/1.73
EOSINOPHIL # BLD AUTO: 0.02 10*3/MM3 (ref 0–0.4)
EOSINOPHIL NFR BLD AUTO: 0.3 % (ref 0.3–6.2)
ERYTHROCYTE [DISTWIDTH] IN BLOOD BY AUTOMATED COUNT: 11.8 % (ref 12.3–15.4)
GLUCOSE BLDC GLUCOMTR-MCNC: 100 MG/DL (ref 70–130)
GLUCOSE BLDC GLUCOMTR-MCNC: 126 MG/DL (ref 70–130)
GLUCOSE BLDC GLUCOMTR-MCNC: 150 MG/DL (ref 70–130)
GLUCOSE BLDC GLUCOMTR-MCNC: 186 MG/DL (ref 70–130)
GLUCOSE SERPL-MCNC: 91 MG/DL (ref 65–99)
HBA1C MFR BLD: 5.3 % (ref 4.8–5.6)
HCT VFR BLD AUTO: 41.3 % (ref 37.5–51)
HDLC SERPL-MCNC: 51 MG/DL (ref 40–60)
HGB BLD-MCNC: 14 G/DL (ref 13–17.7)
IMM GRANULOCYTES # BLD AUTO: 0.02 10*3/MM3 (ref 0–0.05)
IMM GRANULOCYTES NFR BLD AUTO: 0.3 % (ref 0–0.5)
LDH SERPL-CCNC: 288 U/L (ref 135–225)
LDLC SERPL CALC-MCNC: 74 MG/DL (ref 0–100)
LDLC/HDLC SERPL: 1.42 {RATIO}
LYMPHOCYTES # BLD AUTO: 0.73 10*3/MM3 (ref 0.7–3.1)
LYMPHOCYTES NFR BLD AUTO: 10.9 % (ref 19.6–45.3)
MCH RBC QN AUTO: 31.8 PG (ref 26.6–33)
MCHC RBC AUTO-ENTMCNC: 33.9 G/DL (ref 31.5–35.7)
MCV RBC AUTO: 93.9 FL (ref 79–97)
MONOCYTES # BLD AUTO: 0.25 10*3/MM3 (ref 0.1–0.9)
MONOCYTES NFR BLD AUTO: 3.7 % (ref 5–12)
NEUTROPHILS NFR BLD AUTO: 5.64 10*3/MM3 (ref 1.7–7)
NEUTROPHILS NFR BLD AUTO: 84.4 % (ref 42.7–76)
NRBC BLD AUTO-RTO: 0 /100 WBC (ref 0–0.2)
PLATELET # BLD AUTO: 163 10*3/MM3 (ref 140–450)
PMV BLD AUTO: 12.8 FL (ref 6–12)
POTASSIUM SERPL-SCNC: 4.3 MMOL/L (ref 3.5–5.2)
RBC # BLD AUTO: 4.4 10*6/MM3 (ref 4.14–5.8)
SODIUM SERPL-SCNC: 137 MMOL/L (ref 136–145)
TRIGL SERPL-MCNC: 102 MG/DL (ref 0–150)
VLDLC SERPL-MCNC: 19 MG/DL (ref 5–40)
WBC NRBC COR # BLD AUTO: 6.69 10*3/MM3 (ref 3.4–10.8)

## 2024-11-19 PROCEDURE — 25010000002 LIDOCAINE 1 % SOLUTION: Performed by: INTERNAL MEDICINE

## 2024-11-19 PROCEDURE — 74177 CT ABD & PELVIS W/CONTRAST: CPT

## 2024-11-19 PROCEDURE — 85025 COMPLETE CBC W/AUTO DIFF WBC: CPT

## 2024-11-19 PROCEDURE — 77012 CT SCAN FOR NEEDLE BIOPSY: CPT

## 2024-11-19 PROCEDURE — 88307 TISSUE EXAM BY PATHOLOGIST: CPT | Performed by: INTERNAL MEDICINE

## 2024-11-19 PROCEDURE — 25510000001 IOPAMIDOL 61 % SOLUTION: Performed by: INTERNAL MEDICINE

## 2024-11-19 PROCEDURE — 25510000002 GADOBENATE DIMEGLUMINE 529 MG/ML SOLUTION: Performed by: INTERNAL MEDICINE

## 2024-11-19 PROCEDURE — 82948 REAGENT STRIP/BLOOD GLUCOSE: CPT

## 2024-11-19 PROCEDURE — 83615 LACTATE (LD) (LDH) ENZYME: CPT | Performed by: INTERNAL MEDICINE

## 2024-11-19 PROCEDURE — 25010000002 MIDAZOLAM PER 1 MG: Performed by: RADIOLOGY

## 2024-11-19 PROCEDURE — 70553 MRI BRAIN STEM W/O & W/DYE: CPT

## 2024-11-19 PROCEDURE — 88342 IMHCHEM/IMCYTCHM 1ST ANTB: CPT | Performed by: INTERNAL MEDICINE

## 2024-11-19 PROCEDURE — 80061 LIPID PANEL: CPT

## 2024-11-19 PROCEDURE — 97530 THERAPEUTIC ACTIVITIES: CPT

## 2024-11-19 PROCEDURE — 25810000003 SODIUM CHLORIDE 0.9 % SOLUTION

## 2024-11-19 PROCEDURE — 25010000002 HYDRALAZINE PER 20 MG

## 2024-11-19 PROCEDURE — 71260 CT THORAX DX C+: CPT

## 2024-11-19 PROCEDURE — 99223 1ST HOSP IP/OBS HIGH 75: CPT | Performed by: INTERNAL MEDICINE

## 2024-11-19 PROCEDURE — 99222 1ST HOSP IP/OBS MODERATE 55: CPT | Performed by: NURSE PRACTITIONER

## 2024-11-19 PROCEDURE — 97165 OT EVAL LOW COMPLEX 30 MIN: CPT

## 2024-11-19 PROCEDURE — A9577 INJ MULTIHANCE: HCPCS | Performed by: INTERNAL MEDICINE

## 2024-11-19 PROCEDURE — 88341 IMHCHEM/IMCYTCHM EA ADD ANTB: CPT | Performed by: INTERNAL MEDICINE

## 2024-11-19 PROCEDURE — 70450 CT HEAD/BRAIN W/O DYE: CPT

## 2024-11-19 PROCEDURE — 0FB13ZX EXCISION OF RIGHT LOBE LIVER, PERCUTANEOUS APPROACH, DIAGNOSTIC: ICD-10-PCS | Performed by: RADIOLOGY

## 2024-11-19 PROCEDURE — 25010000002 DEXAMETHASONE SODIUM PHOSPHATE 20 MG/5ML SOLUTION

## 2024-11-19 PROCEDURE — 97162 PT EVAL MOD COMPLEX 30 MIN: CPT

## 2024-11-19 PROCEDURE — 83036 HEMOGLOBIN GLYCOSYLATED A1C: CPT

## 2024-11-19 PROCEDURE — 80048 BASIC METABOLIC PNL TOTAL CA: CPT

## 2024-11-19 RX ORDER — IOPAMIDOL 612 MG/ML
100 INJECTION, SOLUTION INTRAVASCULAR
Status: COMPLETED | OUTPATIENT
Start: 2024-11-19 | End: 2024-11-19

## 2024-11-19 RX ORDER — SODIUM CHLORIDE 9 MG/ML
75 INJECTION, SOLUTION INTRAVENOUS CONTINUOUS
Status: DISCONTINUED | OUTPATIENT
Start: 2024-11-19 | End: 2024-11-19

## 2024-11-19 RX ORDER — LEVETIRACETAM 500 MG/1
500 TABLET ORAL 2 TIMES DAILY
Status: DISCONTINUED | OUTPATIENT
Start: 2024-11-19 | End: 2024-11-21 | Stop reason: HOSPADM

## 2024-11-19 RX ORDER — TERAZOSIN 2 MG/1
2 CAPSULE ORAL NIGHTLY
Status: DISCONTINUED | OUTPATIENT
Start: 2024-11-19 | End: 2024-11-21 | Stop reason: HOSPADM

## 2024-11-19 RX ORDER — CARVEDILOL 12.5 MG/1
12.5 TABLET ORAL 2 TIMES DAILY
Status: DISCONTINUED | OUTPATIENT
Start: 2024-11-19 | End: 2024-11-21 | Stop reason: HOSPADM

## 2024-11-19 RX ORDER — LIDOCAINE HYDROCHLORIDE 10 MG/ML
20 INJECTION, SOLUTION INFILTRATION; PERINEURAL ONCE
Status: COMPLETED | OUTPATIENT
Start: 2024-11-19 | End: 2024-11-19

## 2024-11-19 RX ORDER — FAMOTIDINE 10 MG/ML
20 INJECTION, SOLUTION INTRAVENOUS DAILY
Status: DISCONTINUED | OUTPATIENT
Start: 2024-11-19 | End: 2024-11-20

## 2024-11-19 RX ORDER — MIDAZOLAM HYDROCHLORIDE 1 MG/ML
1 INJECTION, SOLUTION INTRAMUSCULAR; INTRAVENOUS ONCE
Status: COMPLETED | OUTPATIENT
Start: 2024-11-19 | End: 2024-11-19

## 2024-11-19 RX ORDER — GUAIFENESIN 600 MG/1
600 TABLET, EXTENDED RELEASE ORAL EVERY 12 HOURS SCHEDULED
Status: DISCONTINUED | OUTPATIENT
Start: 2024-11-20 | End: 2024-11-21 | Stop reason: HOSPADM

## 2024-11-19 RX ADMIN — LEVOTHYROXINE SODIUM 50 MCG: 50 TABLET ORAL at 10:02

## 2024-11-19 RX ADMIN — CARVEDILOL 12.5 MG: 12.5 TABLET, FILM COATED ORAL at 13:31

## 2024-11-19 RX ADMIN — DEXAMETHASONE SODIUM PHOSPHATE 4 MG: 4 INJECTION, SOLUTION INTRAMUSCULAR; INTRAVENOUS at 05:57

## 2024-11-19 RX ADMIN — Medication 10 ML: at 20:19

## 2024-11-19 RX ADMIN — DEXAMETHASONE SODIUM PHOSPHATE 4 MG: 4 INJECTION, SOLUTION INTRAMUSCULAR; INTRAVENOUS at 01:01

## 2024-11-19 RX ADMIN — DEXAMETHASONE SODIUM PHOSPHATE 4 MG: 4 INJECTION, SOLUTION INTRAMUSCULAR; INTRAVENOUS at 10:01

## 2024-11-19 RX ADMIN — IOPAMIDOL 85 ML: 612 INJECTION, SOLUTION INTRAVENOUS at 05:34

## 2024-11-19 RX ADMIN — SODIUM CHLORIDE 75 ML/HR: 9 INJECTION, SOLUTION INTRAVENOUS at 05:56

## 2024-11-19 RX ADMIN — LEVETIRACETAM 500 MG: 500 TABLET, FILM COATED ORAL at 13:31

## 2024-11-19 RX ADMIN — Medication 10 ML: at 10:02

## 2024-11-19 RX ADMIN — DEXAMETHASONE SODIUM PHOSPHATE 4 MG: 4 INJECTION, SOLUTION INTRAMUSCULAR; INTRAVENOUS at 22:34

## 2024-11-19 RX ADMIN — GADOBENATE DIMEGLUMINE 15 ML: 529 INJECTION, SOLUTION INTRAVENOUS at 17:53

## 2024-11-19 RX ADMIN — LIDOCAINE HYDROCHLORIDE 20 ML: 10 INJECTION, SOLUTION INFILTRATION; PERINEURAL at 14:49

## 2024-11-19 RX ADMIN — HYDRALAZINE HYDROCHLORIDE 10 MG: 20 INJECTION INTRAMUSCULAR; INTRAVENOUS at 10:01

## 2024-11-19 RX ADMIN — DEXAMETHASONE SODIUM PHOSPHATE 4 MG: 4 INJECTION, SOLUTION INTRAMUSCULAR; INTRAVENOUS at 16:18

## 2024-11-19 RX ADMIN — MIDAZOLAM 1 MG: 1 INJECTION INTRAMUSCULAR; INTRAVENOUS at 14:20

## 2024-11-19 NOTE — CONSULTS
Chp visiting PtLeon, during ICU rounds. Pt was awake and sitting up in bed, Spouse Gladis at bedside. Chp spoke with Pt and spouse to provide them a supportive presence. Pt expressed being nevous as they waited for results back and wondered what his next steps would be. Pt supported by wife of 55 years Gladis and daughter, who is in [place]. Pt and Spouse find comfort and strength in their spiritual and welcomed spiritual support from . Chp prayed with Pt and spouse at their request for strength and answers. Chp remains available to Pt and family as needed and will be following up with them.

## 2024-11-19 NOTE — NURSING NOTE
"Pt admitted 11/18 after seeking medical attention for headache. Several areas of bleeding in the brain were noted. Further workup showed innumerous liver lesions. Liver biopsy was completed today (Ct guided needle biopsy). The biopsy site is sore, but not signs of bleeding at this time. Will continue to assess. VSS. Only mild neurological symptoms noted. On occasion, mild aphasia is noted. Pt states this happens sometimes and is \"not new\". Pt denies unilateral weakness or numbness. Pt denies headache, nausea, or dizziness. MRI brain completed. Pt was able to ambulate in his room and during multiple procedures today. Pt sat in chair in his room. Tolerating oral intake well. Pt does verbalize concern for possible cancer diagnosis.   "

## 2024-11-19 NOTE — CONSULTS
[facility], [place], operated by [facility] NEUROSURGERY CONSULT NOTE    Patient name: Leon Lemon  Referring Provider: DOC Justice  Reason for Consultation: [facility]    Patient Care Team:  Law Cole MD as PCP - General (Family Medicine)  Blane Greer MD as Consulting Physician (General Surgery)    Chief complaint: HA    Subjective .     History of present illness:    Patient is a 79 y.o.  male presented to hospital with abnormal CT head and headache.  He reports sudden severe headache that began Wednesday of last week.  He took Tylenol for several days with no help.  He states that when he was busy he would noticed the headache less than when he was an active.  His headache was more severe on Saturday morning and Monday morning when he awoke.  In the last 24 hours prior to admission his wife has noticed that he has had some difficulties with word finding otherwise he denies any visual changes, nausea, vomiting, weakness.  He does report reduced appetite over the last week.  He saw his PCP on Tuesday and had stat CT head which showed intracranial hemorrhage and he was directly sent to the ER for admission.    He has a history of melanoma that was resected from his left neck 3 years ago.  He was advised that the entire mass was resected and he did not have any type of adjunct therapy with chemo or radiation.  He does not recall ever seeing an oncologist.  He is a non-smoker.  No anticoagulation.    Review of Systems  Review of Systems   Constitutional:  Positive for appetite change. Negative for chills and fever.   HENT:  Negative for trouble swallowing.    Eyes:  Negative for visual disturbance.   Gastrointestinal:  Negative for nausea and vomiting.   Genitourinary:  Negative for enuresis.   Musculoskeletal:  Negative for gait problem.   Neurological:  Positive for speech difficulty and headaches. Negative for weakness and numbness.   Psychiatric/Behavioral:  Negative for confusion.        History  PAST MEDICAL HISTORY  Past Medical  History:   Diagnosis Date    Arthritis     Bradycardia     Cancer     Melanoma - surgery    High blood pressure     High cholesterol        PAST SURGICAL HISTORY  Past Surgical History:   Procedure Laterality Date    SKIN CANCER EXCISION      Melanoma excision       FAMILY HISTORY  Family History   Problem Relation Age of Onset    Cancer Brother     No Known Problems Mother     No Known Problems Father        SOCIAL HISTORY  Social History     Tobacco Use    Smoking status: Never    Smokeless tobacco: Former     Types: Snuff   Vaping Use    Vaping status: Never Used   Substance Use Topics    Alcohol use: Not Currently    Drug use: Never       retired  Lives with wife    Allergies:  Patient has no known allergies.    MEDICATIONS:  Medications Prior to Admission   Medication Sig Dispense Refill Last Dose/Taking    atorvastatin (LIPITOR) 10 MG tablet atorvastatin 10 mg oral tablet take 1 tablet (10 mg) by oral route once daily at bedtime   Active   11/17/2024    carvedilol (COREG) 25 MG tablet Take 0.5 tablets by mouth 2 (Two) Times a Day. 90 tablet 3 11/18/2024    levothyroxine (SYNTHROID, LEVOTHROID) 50 MCG tablet Take 1 tablet by mouth Daily.   11/18/2024    doxazosin (CARDURA) 2 MG tablet Take 1 tablet by mouth Daily.   More than a month         Current Facility-Administered Medications:     sennosides-docusate (PERICOLACE) 8.6-50 MG per tablet 2 tablet, 2 tablet, Oral, BID **AND** polyethylene glycol (MIRALAX) packet 17 g, 17 g, Oral, Daily PRN **AND** bisacodyl (DULCOLAX) EC tablet 5 mg, 5 mg, Oral, Daily PRN **AND** bisacodyl (DULCOLAX) suppository 10 mg, 10 mg, Rectal, Daily PRN, Rashida Fitch APRN    carvedilol (COREG) tablet 12.5 mg, 12.5 mg, Oral, BID, Ezequiel Allen MD, 12.5 mg at 11/19/24 1331    dexAMETHasone sodium phosphate injection 4 mg, 4 mg, Intravenous, Q6H, Rashida Fitch APRN, 4 mg at 11/19/24 1618    famotidine (PEPCID) injection 20 mg, 20 mg, Intravenous, Daily, Rashida Renee,  DOC    hydrALAZINE (APRESOLINE) injection 10 mg, 10 mg, Intravenous, Q4H PRN, Rashida Fitch APRN, 10 mg at 11/19/24 1001    levETIRAcetam (KEPPRA) tablet 500 mg, 500 mg, Oral, BID, Rashida Renee APRN, 500 mg at 11/19/24 1331    levothyroxine (SYNTHROID, LEVOTHROID) tablet 50 mcg, 50 mcg, Oral, Daily, Rashida Fitch APRN, 50 mcg at 11/19/24 1002    mupirocin (BACTROBAN) 2 % nasal ointment 1 Application, 1 Application, Each Nare, BID, Rashida Fitch APRN    niCARdipine (CARDENE) 25 mg in 250 mL NS infusion kit, 5-15 mg/hr, Intravenous, Titrated, Rashida Fitch APRN    nitroglycerin (NITROSTAT) SL tablet 0.4 mg, 0.4 mg, Sublingual, Q5 Min PRN, Rashida Fitch APRN    ondansetron (ZOFRAN) injection 4 mg, 4 mg, Intravenous, Q6H PRN, Rashida Fitch APRN    [COMPLETED] Insert Peripheral IV, , , Once **AND** sodium chloride 0.9 % flush 10 mL, 10 mL, Intravenous, PRN, Aliya Warner MD    sodium chloride 0.9 % flush 10 mL, 10 mL, Intravenous, Q12H, Rashida Fitch APRN, 10 mL at 11/19/24 1002    sodium chloride 0.9 % flush 10 mL, 10 mL, Intravenous, PRN, Rashida Fitch APRN    sodium chloride 0.9 % infusion 40 mL, 40 mL, Intravenous, PRN, Rashida Fitch APRN    terazosin (HYTRIN) capsule 2 mg, 2 mg, Oral, Nightly, Ezequiel Allen MD      Objective     Results Review:  LABS:  Results from last 7 days   Lab Units 11/19/24  0321 11/18/24  1845   WBC 10*3/mm3 6.69 7.04   HEMOGLOBIN g/dL 14.0 14.0   HEMATOCRIT % 41.3 40.9   PLATELETS 10*3/mm3 163 179     Results from last 7 days   Lab Units 11/19/24  0321 11/18/24  1845   SODIUM mmol/L 137 136   POTASSIUM mmol/L 4.3 4.7   CHLORIDE mmol/L 102 102   CO2 mmol/L 22.1 22.4   BUN mg/dL 17 18   CREATININE mg/dL 1.23 1.37*   CALCIUM mg/dL 9.1 9.4   BILIRUBIN mg/dL  --  2.2*   ALK PHOS U/L  --  174*   ALT (SGPT) U/L  --  17   AST (SGOT) U/L  --  22   GLUCOSE mg/dL 91 92     Results from last 7 days   Lab Units 11/18/24  1845   INR  1.11*          DIAGNOSTICS:  CT head-left frontal, left temporal, left parietal hypodensities consistent with intraparenchymal hemorrhage as well as hemorrhage adjacent to the right lateral ventricle.  There is associated edema surrounding the hemorrhages with mild left to right midline shift.  There are also several low-density lesions within the brain.  Imaging stable on repeat CT head this a.m.    CTA reveals-postcontrast enhancement in multiple areas of the brain including right frontal, right caudate head and surrounding the areas of hemorrhage at several locations.  No evidence of aneurysm, vascular occlusion or thrombus.  No significant great vessel stenosis.    CT chest abdomen pelvis reveals innumerable hepatic metastases as well as indeterminate left renal lesion.  Reportedly there is a small 2 cm lung mass per discussion with Dr. Tmaez.  This is not included in the radiology report.    Results Review:   I reviewed the patient's new clinical results.  I personally viewed and interpreted the patient's CT head.  Also reviewed by and discussed with Dr. Mccrary.    Vital Signs   Temp:  [96.8 °F (36 °C)-98.1 °F (36.7 °C)] 97.5 °F (36.4 °C)  Heart Rate:  [47-79] 60  Resp:  [16-20] 18  BP: (115-153)/(68-83) 127/68    Physical Exam:  Physical Exam  Vitals reviewed.   Constitutional:       Appearance: Normal appearance.   Neck:      Comments: Well-healed left lateral incision.  Pulmonary:      Effort: Pulmonary effort is normal.   Musculoskeletal:      Cervical back: Neck supple.   Skin:     General: Skin is warm and dry.   Neurological:      General: No focal deficit present.      Mental Status: He is oriented to person, place, and time.   Psychiatric:         Mood and Affect: Mood normal.         Thought Content: Thought content normal.       Neurological Exam  Mental Status  Awake, alert and oriented to person, place and time. Oriented to person, place, and time.    Cranial Nerves  CN I: Sense of smell is  normal.    Motor  Normal muscle bulk throughout. No fasciculations present. Normal muscle tone. No abnormal involuntary movements. No pronator drift.    Sensory  Light touch is normal in upper and lower extremities.     Coordination  Right: Finger-to-nose normal.    Gait      Tested.  Awaiting PT eval due to multiple intercranial lesions.      Assessment & Plan       Intracranial hemorrhage    History of melanoma excision      Problem List Items Addressed This Visit          Unprioritized    * (Principal) Intracranial hemorrhage - Primary     Other Visit Diagnoses       Acute intractable headache, unspecified headache type                 COMORBID CONDITIONS:  Cancer including (primary or metastatic)    Patient with history of melanoma resection of left lateral neck 3 years ago presents with acute persistent headache and findings of multiple areas of intracranial hemorrhage and mass on CT head.  CTA reveals multiple enhancing lesions and vasogenic edema.  Headache much improved after steroid initiation.  He reports no issues outside of the headache although his wife states she noticed some mild word finding difficulties in last 24 hours and he does admit to some appetite changes.  He has no focal neurologic deficits on exam.  Will initiate Keppra for seizure prevention and obtain MRI brain with and without contrast for further determination of extent of the suspected metastatic melanoma.  He has innumerable liver lesions and a lung lesion per Dr. Lyn.  He plans to obtain liver biopsy and oncology evaluation.  Recommend continuing in ICU for today.  Continue IV steroid    PLAN:   MRI brain +/-  Cont IV steroid = GI prophylaxis  Keppra- sz prevention  Keep ICU today  OK diet and OOB with assist    I discussed the patient's findings and my recommendations with patient, family, nursing staff, and Dr. cool    During patient visit, I utilized appropriate personal protective equipment including gloves. Appropriate  "PPE was worn during the entire visit.  Hand hygiene was completed before and after.     aRshida Renee, APRN  11/19/24  17:41 EST    \"Dictated utilizing Dragon dictation\".      "

## 2024-11-19 NOTE — SIGNIFICANT NOTE
11/19/24 1124   OTHER   Discipline speech language pathologist   Rehab Time/Intention   Session Not Performed other (see comments)  (RN denies need for speech therapy services at this time. Please re-consult as warranted.)

## 2024-11-19 NOTE — THERAPY EVALUATION
Patient Name: Leon Lemon  : 1945    MRN: [id number]                              Today's Date: 2024       Admit Date: 2024    Visit Dx:     ICD-10-CM ICD-9-CM   1. Intracranial hemorrhage  I62.9 432.9   2. Acute intractable headache, unspecified headache type  R51.9 784.0     Patient Active Problem List   Diagnosis    Bradycardia, sinus    Hyperlipemia, mixed    Subclinical iodine-deficiency hypothyroidism    Intracranial hemorrhage     Past Medical History:   Diagnosis Date    Arthritis     Bradycardia     Cancer     Melanoma - surgery    High blood pressure     High cholesterol      Past Surgical History:   Procedure Laterality Date    SKIN CANCER EXCISION      Melanoma excision      General Information       Row Name 24 1436          OT Time and Intention    Subjective Information no complaints  -BC     Document Type evaluation  -BC     Mode of Treatment individual therapy;occupational therapy  -BC     Patient Effort excellent  -BC     Symptoms Noted During/After Treatment none  -BC       Row Name 24 1436          General Information    Patient Profile Reviewed yes  -BC     Prior Level of Function independent:;all household mobility;ADL's  -BC     Existing Precautions/Restrictions no known precautions/restrictions  -BC       Row Name 24 1436          Living Environment    People in Home spouse  -BC       Row Name 24 1436          Stairs Within Home, Primary    Stairs, Within Home, Primary Enters through basement where 'man cave' is and go upstairs to main level.  -BC       Row Name 24 1436          Cognition    Orientation Status (Cognition) oriented x 4  -BC       Row Name 24 1436          Safety Issues/Impairments Affecting Functional Mobility    Impairments Affecting Function (Mobility) balance;other (see comments)  baseline higher level balance deficits. No acute changes  -BC               User Key  (r) = Recorded By, (t) = Taken By, (c) = Cosigned  By      Initials Name Provider Type    Elvia Yepez OT Occupational Therapist                     Mobility/ADL's       Row Name 11/19/24 1439          Bed Mobility    Bed Mobility supine-sit;sit-supine  -BC     Supine-Sit [place] (Bed Mobility) set up  -BC     Sit-Supine [place] (Bed Mobility) set up  -BC     Comment, (Bed Mobility) set up for line mgmt only  -[facility] Name 11/19/24 1439          Transfers    Transfers sit-stand transfer;stand-sit transfer  -BC       Row Name 11/19/24 1439          Sit-Stand Transfer    Sit-Stand [place] (Transfers) modified independence  -BC     Comment, (Sit-Stand Transfer) mod (I) w/o AD to/from EOB  -[facility] Name 11/19/24 1439          Stand-Sit Transfer    Stand-Sit [place] (Transfers) modified independence  -BC       Row Name 11/19/24 1439          Functional Mobility    Functional Mobility- Comment >300 feet on unit w/ Sup A w/o AD.  -BC     Patient was able to Ambulate yes  -[facility] Name 11/19/24 1439          Activities of Daily Living    BADL Assessment/Intervention --  observed (I) with thread BLEs into jeans and pull up in standing, managing belt and buttons, don/tie laces  -BC               User Key  (r) = Recorded By, (t) = Taken By, (c) = Cosigned By      Initials Name Provider Type    Elvia Yepez OT Occupational Therapist                   Obj/Interventions       Row Name 11/19/24 1441          Sensory Assessment (Somatosensory)    Sensory Assessment (Somatosensory) sensation intact  -[facility] Name 11/19/24 1441          Vision Assessment/Intervention    Visual Impairment/Limitations WNL  -[facility] Name 11/19/24 1441          Range of Motion Comprehensive    General Range of Motion no range of motion deficits identified  -[facility] Name 11/19/24 1441          Strength Comprehensive (MMT)    General Manual Muscle Testing (MMT) Assessment no strength deficits identified  -[facility] Name 11/19/24 1441           Balance    Balance Interventions standing;tandem gait  -BC     Comment, Balance CGA for safety w/ noted balance instability w/ higher level tasks but Pt reports this is not new acute changes with baseline balance deficits. Does not appear worse than baseline  -BC               User Key  (r) = Recorded By, (t) = Taken By, (c) = Cosigned By      Initials Name Provider Type    Elvia Yepez, OT Occupational Therapist                   Goals/Plan    No documentation.                  Clinical Impression       Row Name 11/19/24 1443          Pain Assessment    Pretreatment Pain Rating 0/10 - no pain  -BC     Posttreatment Pain Rating 0/10 - no pain  -[facility] Name 11/19/24 1443          Plan of Care Review    Progress improving  -BC     Outcome Evaluation Pt is a 80 y/o M admitted to [facility] 11/18 with headaches for multiple days and no other signs/symptoms. Pt w/ acute intraparenchymal hemorrhage is noted in the left frontal, left temporal and left frontal parietal lobe. Findings may be due to hemorrhagic metasteses w/ plan for liver biopsy today vs tomorrow per RN. Pt demonstrated w/ no new focal deficits and is at baseline for all ADLs/mobility, does have some baseline higher level balance deficits but no overt LOB noted or needs for acute Ot services. Please consult in the future if function changes/declines. Rec home w/ spouse at this time when medically stable.  -BC       Row Name 11/19/24 1443          Therapy Assessment/Plan (OT)    Criteria for Skilled Therapeutic Interventions Met (OT) no;does not meet criteria for skilled intervention  -BC       Row Name 11/19/24 1443          Therapy Plan Review/Discharge Plan (OT)    Anticipated Discharge Disposition (OT) home  -BC       Row Name 11/19/24 1443          Vital Signs    O2 Delivery Pre Treatment room air  -BC     Pre Patient Position Supine  -BC     Intra Patient Position Standing  -[facility] Name 11/19/24 1443          Positioning and Restraints     Pre-Treatment Position in bed  -BC     Post Treatment Position other  -BC     Other Position with PT  -BC               User Key  (r) = Recorded By, (t) = Taken By, (c) = Cosigned By      Initials Name Provider Type    Elvia Yepez OT Occupational Therapist                   Outcome Measures       Row Name 11/19/24 1446          How much help from another is currently needed...    Putting on and taking off regular lower body clothing? 4  -BC     Bathing (including washing, rinsing, and drying) 3  -BC     Toileting (which includes using toilet bed pan or urinal) 4  -BC     Putting on and taking off regular upper body clothing 4  -BC     Taking care of personal grooming (such as brushing teeth) 4  -BC     Eating meals 4  -BC     AM-PAC 6 Clicks Score (OT) 23  -BC       Row Name 11/19/24 1446          Functional Assessment    Outcome Measure Options AM-PAC 6 Clicks Daily Activity (OT)  -BC               User Key  (r) = Recorded By, (t) = Taken By, (c) = Cosigned By      Initials Name Provider Type    Elvia Yepez OT Occupational Therapist                    Occupational Therapy Education       Title: PT OT SLP Therapies (In Progress)       Topic: Occupational Therapy (In Progress)       Point: ADL training (Done)       Description:   Instruct learner(s) on proper safety adaptation and remediation techniques during self care or transfers.   Instruct in proper use of assistive devices.                  Learning Progress Summary            Patient Acceptance, E,TB, VU by BC at 11/19/2024 5208    Comment: colton of OT at acute level, home safety and no further IP OT needs.                      Point: Home exercise program (Not Started)       Description:   Instruct learner(s) on appropriate technique for monitoring, assisting and/or progressing therapeutic exercises/activities.                  Learner Progress:  Not documented in this visit.              Point: Precautions (Not Started)       Description:    Instruct learner(s) on prescribed precautions during self-care and functional transfers.                  Learner Progress:  Not documented in this visit.              Point: Body mechanics (Done)       Description:   Instruct learner(s) on proper positioning and spine alignment during self-care, functional mobility activities and/or exercises.                  Learning Progress Summary            Patient Acceptance, E,TB, VU by BC at 11/19/2024 1782    Comment: colton of OT at acute level, home safety and no further IP OT needs.                                      User Key       Initials Effective Dates Name Provider Type Discipline    BC 07/29/24 -  Elvia Diaz OT Occupational Therapist OT                  OT Recommendation and Plan     Plan of Care Review  Progress: improving  Outcome Evaluation: Pt is a 78 y/o M admitted to [facility] 11/18 with headaches for multiple days and no other signs/symptoms. Pt w/ acute intraparenchymal hemorrhage is noted in the left frontal, left temporal and left frontal parietal lobe. Findings may be due to hemorrhagic metasteses w/ plan for liver biopsy today vs tomorrow per RN. Pt demonstrated w/ no new focal deficits and is at baseline for all ADLs/mobility, does have some baseline higher level balance deficits but no overt LOB noted or needs for acute Ot services. Please consult in the future if function changes/declines. Rec home w/ spouse at this time when medically stable.     Time Calculation:   Evaluation Complexity (OT)  Review Occupational Profile/Medical/Therapy History Complexity: expanded/moderate complexity  Assessment, Occupational Performance/Identification of Deficit Complexity: 1-3 performance deficits  Clinical Decision Making Complexity (OT): problem focused assessment/low complexity  Overall Complexity of Evaluation (OT): low complexity     Time Calculation- OT       Row Name 11/19/24 1449             Time Calculation- OT    OT Start Time 1305  -BC      OT Stop  Time 1315  -BC      OT Time Calculation (min) 10 min  -BC      Total Timed Code Minutes- OT 0 minute(s)  -BC      OT Received On 11/19/24  -BC                User Key  (r) = Recorded By, (t) = Taken By, (c) = Cosigned By      Initials Name Provider Type    BC Elvia Diaz OT Occupational Therapist                  Therapy Charges for Today       Code Description Service Date Service Provider Modifiers Qty    [id number]  OT EVAL LOW COMPLEXITY 2 11/19/2024 Elvia Diaz OT GO 1                 Elvia Diaz OT  11/19/2024

## 2024-11-19 NOTE — H&P
Group: [facility]        HISTORY AND PHYSICAL    Patient Identification:  Leon Lemon  79 y.o.  male  1945  [id number]            CC: Headache    History of Present Illness:  Leon Lemon is a 79-year-old male with a past medical history of bradycardia, hypertension, hyperlipidemia, hypothyroidism. Lifelong non-smoker- history of smokeless tobacco use (snuff).    He presented to the emergency department on 11/18/2024 with complaint of acute headache and abnormal CT findings.  Patient reports he has had a consistent headache since Wednesday (11/13).  Denies recent trauma.  He originally saw his primary care provider who ordered an outpatient CT scan of the head.  CT  head without contrast revealed an acute intraparenchymal hemorrhage within the left frontal, left temporal, and left frontal parietal lobes and acute hemorrhage in the right lateral ventricle.  His primary care provider recommended coming to the emergency department for further evaluation.  Patient denies any use of aspirin, antiplatelets or anticoagulation.  ED evaluation largely unremarkable: Creatinine 1.37, PT 14.5, INR 1.11, WBC 7.04.    Neurosurgery was consulted from the emergency department-recommended CTA head and neck and admission to the intensive care unit.    Review of Systems:  CONSTITUTIONAL:  Denies fevers or chills  EYE:  No new vision changes  EAR:  No change in hearing  CARDIAC:  No chest pain  PULMONARY:  No productive cough or shortness of breath  GI:  No diarrhea, hematemesis or hematochezia,  RENAL:  No dysuria or urinary frequency  MUSCULOSKELETAL:  No musculoskeletal complaints  ENDOCRINE:  No heat or cold intolerance  INTEGUMENTARY: No skin rashes  NEUROLOGICAL:  No dizziness or confusion.  No seizure activity- + recent headache, has since resolved  PSYCHIATRIC:  No new anxiety or depression  12 system review of systems performed and all else negative     Past Medical History:  Past Medical History:  "  Diagnosis Date    Arthritis     Bradycardia     Cancer     Melanoma - surgery    High blood pressure     High cholesterol        Past Surgical History:  Past Surgical History:   Procedure Laterality Date    SKIN CANCER EXCISION      Melanoma excision        Home Meds:  (Not in a hospital admission)      Allergies:  No Known Allergies    Social History:   Social History     Socioeconomic History    Marital status:    Tobacco Use    Smoking status: Never    Smokeless tobacco: Former     Types: Snuff   Vaping Use    Vaping status: Never Used   Substance and Sexual Activity    Alcohol use: Not Currently    Drug use: Never    Sexual activity: Not Currently     Partners: Female       Family History:  Family History   Problem Relation Age of Onset    Cancer Brother     No Known Problems Mother     No Known Problems Father        Physical Exam:  /75   Pulse (!) 47   Temp 96.8 °F (36 °C)   Resp 20   Ht 170.2 cm (67.01\")   Wt 69.9 kg (154 lb 1.6 oz)   SpO2 95%   BMI 24.13 kg/m²  Body mass index is 24.13 kg/m². 95% 69.9 kg (154 lb 1.6 oz)  Constitutional: Elderly male, appears younger than stated age pt in bed, No acute respiratory distress, no accessory muscle use  Head: - NCAT  Eyes: No pallor, Anicteric conjunctiva, EOMI.  ENMT:  Mallampati 3, no oral thrush. Dry MM.   NECK: Trachea midline, No thyromegaly, no palpable cervical LNpathy  Heart: Regular rate/intermittent bradycardic rate, regular rhythm, no murmur. No pedal edema   Lungs: SOCORRO +, No wheezes/ crackles heard    Abdomen: Soft. No tenderness, guarding or rigidity. No palpable masses  Extremities: Extremities warm and well perfused. No cyanosis/ clubbing  Neuro: Conscious, answers appropriately, no gross focal neuro deficits  Psych: Mood and affect appropriate    PPE recommended per [facility] infectious disease Isolation protocol for the current clinical scenario(as mentioned below) was followed.      LABS:  Coronavirus (COVID-19) " "  Date Value Ref Range Status   07/27/2020 NOT DETECTED NA Final     Comment:     The SARS-CoV-2 assay is a real-time, RT-PCR test intended  for the qualitative detection of nucleic acid from the  SARS-CoV-2 in respiratory specimens from individuals,  testing performed at [facility] reference lab.         Lab Results   Component Value Date    CALCIUM 9.4 11/18/2024     Results from last 7 days   Lab Units 11/18/24  1845   SODIUM mmol/L 136   POTASSIUM mmol/L 4.7   CHLORIDE mmol/L 102   CO2 mmol/L 22.4   BUN mg/dL 18   CREATININE mg/dL 1.37*   GLUCOSE mg/dL 92   CALCIUM mg/dL 9.4   WBC 10*3/mm3 7.04   HEMOGLOBIN g/dL 14.0   PLATELETS 10*3/mm3 179   ALT (SGPT) U/L 17   AST (SGOT) U/L 22     No results found for: \"CKTOTAL\", \"CKMB\", \"CKMBINDEX\", \"TROPONINI\", \"TROPONINT\"                      Results from last 7 days   Lab Units 11/18/24  1845   INR  1.11*         No results found for: \"TSH\"  Estimated Creatinine Clearance: 43.9 mL/min (A) (by C-G formula based on SCr of 1.37 mg/dL (H)).         Imaging: I personally visualized the images of scans/x-rays performed within last 3 days.      Assessment:  Intracranial hemorrhage  Hypertension  Hyperlipidemia  Hypothyroidism  History of melanoma status post excision    Recommendations:  Intracranial hemorrhage  CT head 11/18/2024 shows acute intraparenchymal hemorrhage within the left frontal, left temporal, and left frontal parietal lobes with acute hemorrhage in the right lateral ventricle.  Neurosurgery, Dr. Yepez, consulted from ED- recommended CTA head and neck.  CTA head and neck revealed areas of hyperdense hemorrhage in the left cerebral hemisphere with another focus of high attenuation in the right caudate head with multiple additional lancing lesions and surrounding edema, consistent with metastatic disease.  Admit to the ICU.  Repeat CT head without contrast for the morning.  Neurochecks per rota call.  Goal SBP less than 140.  Hydralazine and " nicardipine available.  OT/PT/SLP consult.  Given vasogenic edema on imaging, initiate Decadron 4 mg every 6 hours.  Check CT with contrast of chest, abdomen and pelvis to evaluate for primary lesion.    Hypertension  Home medication: Carvedilol, doxazosin, hold.    Hyperlipidemia  Home medication: Atorvastatin 10 mg daily, hold    Hypothyroidism  Home medication: Levothyroxine, continue.    History of melanoma status post excision      Patient was placed in face mask upon entering room and kept mask on throughout our encounter. I wore full protective equipment throughout this patient encounter including a face mask, gown and gloves. Hand hygiene was performed before donning protective equipment and after removal when leaving the room.    Rashida Fitch, APRN  11/18/2024  20:24 EST      Much of this encounter note is an electronic transcription/translation of spoken language to printed text using Dragon Software.

## 2024-11-19 NOTE — ED NOTES
Nursing report ED to floor  Leon Lemon  79 y.o.  male    HPI :  HPI  Stated Reason for Visit: abnormal imaging, headache  History Obtained From: EMS, patient    Chief Complaint  Chief Complaint   Patient presents with    Abnormal Imaging    Headache       Admitting doctor:   Magda Washburn MD    Admitting diagnosis:   The primary encounter diagnosis was Intracranial hemorrhage. A diagnosis of Acute intractable headache, unspecified headache type was also pertinent to this visit.    Code status:   Current Code Status       Date Active Code Status Order ID Comments User Context       Not on file            Allergies:   Patient has no known allergies.    Isolation:   No active isolations    Intake and Output  No intake or output data in the 24 hours ending 11/18/24 2026    Weight:       11/18/24 1745   Weight: 69.9 kg (154 lb 1.6 oz)       Most recent vitals:   Vitals:    11/18/24 1814 11/18/24 1815 11/18/24 1901 11/18/24 1931   BP:   132/72 136/75   Pulse: 51 51 (!) 47    Resp:       Temp:       SpO2: 96% 95% 93% 95%   Weight:       Height:           Active LDAs/IV Access:   Lines, Drains & Airways       Active LDAs       Name Placement date Placement time Site Days    Peripheral IV 11/18/24 1819 Posterior;Right Hand 11/18/24 1819  Hand  less than 1    Peripheral IV 11/18/24 2012 Left;Posterior Hand 11/18/24 2012  Hand  less than 1                    Labs (abnormal labs have a star):   Labs Reviewed   COMPREHENSIVE METABOLIC PANEL - Abnormal; Notable for the following components:       Result Value    Creatinine 1.37 (*)     Alkaline Phosphatase 174 (*)     Total Bilirubin 2.2 (*)     eGFR 52.8 (*)     All other components within normal limits    Narrative:     GFR Normal >60  Chronic Kidney Disease <60  Kidney Failure <15    The GFR formula is only valid for adults with stable renal function between ages 18 and 70.   PROTIME-INR - Abnormal; Notable for the following components:    Protime 14.5 (*)     INR 1.11  (*)     All other components within normal limits   APTT - Abnormal; Notable for the following components:    PTT 46.7 (*)     All other components within normal limits   CBC WITH AUTO DIFFERENTIAL - Abnormal; Notable for the following components:    RDW 11.7 (*)     MPV 13.2 (*)     Lymphocyte % 17.3 (*)     All other components within normal limits   CBC AND DIFFERENTIAL    Narrative:     The following orders were created for panel order CBC & Differential.  Procedure                               Abnormality         Status                     ---------                               -----------         ------                     CBC Auto Differential[[id number]]        Abnormal            Final result                 Please view results for these tests on the individual orders.       EKG:   ECG 12 Lead Rhythm Change   Final Result   HEART RATE=51  bpm   RR Lxwyqkwt=8278  ms   WY Vlhohqmv=730  ms   P Horizontal Axis=-1  deg   P Front Axis=52  deg   QRSD Aenhfneu=091  ms   QT Tmdzifco=172  ms   WQvE=616  ms   QRS Axis=22  deg   T Wave Axis=37  deg   - OTHERWISE NORMAL ECG -   Sinus rhythm   Abnormal R-wave progression, late transition   No Prior Tracing for Comparison   Electronically Signed By: Nikole Thomas ([facility]) 2024-11-18 19:55:41   Date and Time of Study:2024-11-18 19:12:28          Meds given in ED:   Medications   sodium chloride 0.9 % flush 10 mL (has no administration in time range)       Imaging results:  CT Head Without Contrast    Result Date: 11/18/2024  Impression: 1.Acute intraparenchymal hemorrhage is noted in the left frontal, left temporal and left frontal parietal lobes. Findings may be secondary to hemorrhagic conversion of infarctions, amyloid angiopathy, hypertensive hemorrhages, hemorrhagic metastases, previous trauma and bleeding diathesis. 2.Acute hemorrhage in the right lateral ventricle. 3.MRI is suggested to further evaluate. I called results to Dr. Law Cole at the time of dictation.  Electronically Signed: Toby Rivas MD  11/18/2024 3:57 PM EST  Workstation ID: [id number]     Ambulatory status:   - up with assist    Social issues:   Social History     Socioeconomic History    Marital status:    Tobacco Use    Smoking status: Never    Smokeless tobacco: Former     Types: Snuff   Vaping Use    Vaping status: Never Used   Substance and Sexual Activity    Alcohol use: Not Currently    Drug use: Never    Sexual activity: Not Currently     Partners: Female       Peripheral Neurovascular  Peripheral Neurovascular (Adult)  Peripheral Neurovascular WDL: WDL    Neuro Cognitive  Neuro Cognitive (Adult)  Cognitive/Neuro/Behavioral WDL: .WDL except, all  Level of Consciousness: Alert  Orientation: oriented x 4  Additional Documentation: NIH Stroke Scale (group), [place] Coma Scale (Group)  Headache Assessment  Headache Location: frontal  Description/Character: pressure, throbbing  [place] Coma Scale  Best Eye Response: 4-->(E4) spontaneous  Best Motor Response: 6-->(M6) obeys commands  Best Verbal Response: 5-->(V5) oriented  [place] Coma Scale Score: 15  NIH Stroke Scale  Interval: baseline  1a. Level of Consciousness: 0-->Alert, keenly responsive  1b. LOC Questions: 0-->Answers both questions correctly  1c. LOC Commands: 0-->Performs both tasks correctly  2. Best Gaze: 0-->Normal  3. Visual: 0-->No visual loss  4. Facial Palsy: 0-->Normal symmetrical movements  5a. Motor Arm, Left: 0-->No drift, limb holds 90 (or 45) degrees for full 10 secs  5b. Motor Arm, Right: 0-->No drift, limb holds 90 (or 45) degrees for full 10 secs  6a. Motor Leg, Left: 0-->No drift, leg holds 30 degree position for full 5 secs  6b. Motor Leg, Right: 0-->No drift, leg holds 30 degree position for full 5 secs  7. Limb Ataxia: 0-->Absent  8. Sensory: 0-->Normal, no sensory loss  9. Best Language: 0-->No aphasia, normal  10. Dysarthria: 0-->Normal  11. Extinction and Inattention (formerly Neglect): 0-->No abnormality  Total  (NIH Stroke Scale): 0    Learning  Learning Assessment  Learning Readiness and Ability: no barriers identified    Respiratory  Respiratory WDL  Respiratory WDL: WDL    Abdominal Pain       Pain Assessments  Pain (Adult)  (0-10) Pain Rating: Rest: 10  Pain Location: head    NIH Stroke Scale  NIH Stroke Scale  Interval: baseline  1a. Level of Consciousness: 0-->Alert, keenly responsive  1b. LOC Questions: 0-->Answers both questions correctly  1c. LOC Commands: 0-->Performs both tasks correctly  2. Best Gaze: 0-->Normal  3. Visual: 0-->No visual loss  4. Facial Palsy: 0-->Normal symmetrical movements  5a. Motor Arm, Left: 0-->No drift, limb holds 90 (or 45) degrees for full 10 secs  5b. Motor Arm, Right: 0-->No drift, limb holds 90 (or 45) degrees for full 10 secs  6a. Motor Leg, Left: 0-->No drift, leg holds 30 degree position for full 5 secs  6b. Motor Leg, Right: 0-->No drift, leg holds 30 degree position for full 5 secs  7. Limb Ataxia: 0-->Absent  8. Sensory: 0-->Normal, no sensory loss  9. Best Language: 0-->No aphasia, normal  10. Dysarthria: 0-->Normal  11. Extinction and Inattention (formerly Neglect): 0-->No abnormality  Total (NIH Stroke Scale): 0    Shey Radford RN  11/18/24 20:26 EST

## 2024-11-19 NOTE — THERAPY EVALUATION
Patient Name: Leon Lemon  : 1945    MRN: [id number]                              Today's Date: 2024       Admit Date: 2024    Visit Dx:     ICD-10-CM ICD-9-CM   1. Intracranial hemorrhage  I62.9 432.9   2. Acute intractable headache, unspecified headache type  R51.9 784.0     Patient Active Problem List   Diagnosis    Bradycardia, sinus    Hyperlipemia, mixed    Subclinical iodine-deficiency hypothyroidism    Intracranial hemorrhage     Past Medical History:   Diagnosis Date    Arthritis     Bradycardia     Cancer     Melanoma - surgery    High blood pressure     High cholesterol      Past Surgical History:   Procedure Laterality Date    SKIN CANCER EXCISION      Melanoma excision      General Information       Row Name 24 1633          Physical Therapy Time and Intention    Document Type evaluation  -EJ     Mode of Treatment physical therapy  -EJ       Row Name 24 1633          General Information    Patient Profile Reviewed yes  -EJ     Prior Level of Function independent:;all household mobility;community mobility;ADL's  -EJ     Existing Precautions/Restrictions no known precautions/restrictions  -EJ     Barriers to Rehab none identified  -EJ       Row Name 24 1633          Living Environment    People in Home spouse  -EJ       Row Name 24 1633          Cognition    Orientation Status (Cognition) oriented x 4  -EJ       Row Name 24 1633          Safety Issues/Impairments Affecting Functional Mobility    Impairments Affecting Function (Mobility) balance  slight high level balance deficits, pt reports this is baseline  -EJ               User Key  (r) = Recorded By, (t) = Taken By, (c) = Cosigned By      Initials Name Provider Type    EJ Nessa Cooper, PT Physical Therapist                   Mobility       Row Name 24 1634          Bed Mobility    Supine-Sit [place] (Bed Mobility) independent  -EJ     Sit-Supine [place] (Bed Mobility)  independent  -EJ       [facility] Name 11/19/24 1634          Sit-Stand Transfer    Sit-Stand [place] (Transfers) supervision;independent  -EJ       Row Name 11/19/24 1634          Gait/Stairs (Locomotion)    [place] Level (Gait) standby assist;supervision  -EJ     Distance in Feet (Gait) 200  -EJ               User Key  (r) = Recorded By, (t) = Taken By, (c) = Cosigned By      Initials Name Provider Type    Nessa Bajwa, PT Physical Therapist                   Obj/Interventions       Row Name 11/19/24 1635          Range of Motion Comprehensive    General Range of Motion no range of motion deficits identified  -EJ       Row Name 11/19/24 1635          Strength Comprehensive (MMT)    General Manual Muscle Testing (MMT) Assessment no strength deficits identified  -EJ               User Key  (r) = Recorded By, (t) = Taken By, (c) = Cosigned By      Initials Name Provider Type    Nessa Bajwa, PT Physical Therapist                   Goals/Plan    No documentation.                  Clinical Impression       Row Name 11/19/24 1635          Pain    Pretreatment Pain Rating 0/10 - no pain  -EJ     Posttreatment Pain Rating 0/10 - no pain  -EJ       Row Name 11/19/24 1635          Plan of Care Review    Plan of Care Reviewed With patient;spouse  -EJ     Progress improving  -EJ     Outcome Evaluation Pt seen for PT this afternoon. Pt is a 80 y/o M [facility]18 with headaches for multiple days and no other signs/symptoms. Pt w/ acute intraparenchymal hemorrhage is noted in the left frontal, left temporal and left frontal parietal lobe. Findings may be due to hemorrhagic metasteses w/ plan for liver biopsy today vs tomorrow per RN. Pt mobilizing well at this time and appears at his baseline. Pt able to ambulate approx 200 ft w supervision. No overt unsteadiness noted. Pt noted to have some higher level balance deficits, but he reports this is baseline and this did not result in any overt  unsteadiness or LOB. Pt returned to room at end of session. No further acute PT needs at this time. Please re-consult if function changes or any issues arise.  -       Row Name 11/19/24 1635          Therapy Assessment/Plan (PT)    Criteria for Skilled Interventions Met (PT) no problems identified which require skilled intervention  -     Therapy Frequency (PT) evaluation only  -       Row Name 11/19/24 1635          Positioning and Restraints    Pre-Treatment Position in bed  -EJ     Post Treatment Position bed  -EJ     In Bed supine;notified nsg;call light within reach;encouraged to call for assist;with family/caregiver  -               User Key  (r) = Recorded By, (t) = Taken By, (c) = Cosigned By      Initials Name Provider Type    Nessa Bajwa, PT Physical Therapist                   Outcome Measures       Row Name 11/19/24 1638          How much help from another person do you currently need...    Turning from your back to your side while in flat bed without using bedrails? 4  -EJ     Moving from lying on back to sitting on the side of a flat bed without bedrails? 4  -EJ     Moving to and from a bed to a chair (including a wheelchair)? 4  -EJ     Standing up from a chair using your arms (e.g., wheelchair, bedside chair)? 4  -EJ     Climbing 3-5 steps with a railing? 4  -EJ     To walk in hospital room? 4  -EJ     AM-PAC 6 Clicks Score (PT) 24  -EJ     Highest Level of Mobility Goal 8 --> Walked 250 feet or more  -       Row Name 11/19/24 1446          Functional Assessment    Outcome Measure Options AM-PAC 6 Clicks Daily Activity (OT)  -BC               User Key  (r) = Recorded By, (t) = Taken By, (c) = Cosigned By      Initials Name Provider Type    Nessa Bajwa, PT Physical Therapist    Elvia Yepez, OT Occupational Therapist                                 Physical Therapy Education        No education to display                  PT Recommendation and Plan     Progress:  improving  Outcome Evaluation: Pt seen for PT this afternoon. Pt is a 80 y/o M admitted to [facility] 11/18 with headaches for multiple days and no other signs/symptoms. Pt w/ acute intraparenchymal hemorrhage is noted in the left frontal, left temporal and left frontal parietal lobe. Findings may be due to hemorrhagic metasteses w/ plan for liver biopsy today vs tomorrow per RN. Pt mobilizing well at this time and appears at his baseline. Pt able to ambulate approx 200 ft w supervision. No overt unsteadiness noted. Pt noted to have some higher level balance deficits, but he reports this is baseline and this did not result in any overt unsteadiness or LOB. Pt returned to room at end of session. No further acute PT needs at this time. Please re-consult if function changes or any issues arise.     Time Calculation:         PT Charges       Row Name 11/19/24 1638             Time Calculation    Start Time 1315  -EJ      Stop Time 1328  -EJ      Time Calculation (min) 13 min  -EJ      PT Received On 11/19/24  -EJ         Time Calculation- PT    Total Timed Code Minutes- PT 8 minute(s)  -EJ                User Key  (r) = Recorded By, (t) = Taken By, (c) = Cosigned By      Initials Name Provider Type    EJ Nessa Cooper, PT Physical Therapist                  Therapy Charges for Today       Code Description Service Date Service Provider Modifiers Qty    61266492808  PT EVAL MOD COMPLEXITY 3 11/19/2024 Nessa Cooper, PT GP 1    [id number] HC PT THERAPEUTIC ACT EA 15 MIN 11/19/2024 Nessa Cooper, PT GP 1            PT G-Codes  Outcome Measure Options: AM-PAC 6 Clicks Daily Activity (OT)  AM-PAC 6 Clicks Score (PT): 24  AM-PAC 6 Clicks Score (OT): 23  PT Discharge Summary  Anticipated Discharge Disposition (PT): home    Nessa Cooper PT  11/19/2024

## 2024-11-19 NOTE — CONSULTS
[facility] INITIAL INPATIENT CONSULTATION NOTE    REASON FOR CONSULTATION:    Metastatic malignancy with hemorrhagic brain metastases, history of melanoma    HISTORY OF PRESENT ILLNESS:  Leon Lemon is a 79 y.o. male who we are asked to see today in consultation for the above issues.    Patient with past medical history significant for hypertension, hypothyroidism, hyperlipidemia, bradycardia, arthritis.    The patient is a never smoker    Patient has history of melanoma left neck.  He underwent excision on 7/30/2020 with pathology that showed superficial spreading melanoma, Breslow thickness 1.1 mm, no ulceration, negative margins, mitotic rate 1 mitosis per square millimeter, no lymphovascular invasion.  He underwent sentinel lymph node biopsy with 1 lymph node negative.  Stage IB ([id number]).    The patient presented on 11/18/2024 with development of headache approximately 1 week prior to admission.  Initial CT head on 11/18/2024 with acute intraparenchymal hemorrhage left frontal, left temporal, left frontal parietal lobes and acute hemorrhage in the right lateral ventricle.  CT angiogram head and neck 11/18/2024 showed hyperdense hemorrhage in the left cerebral hemisphere, right caudate head, additional enhancing lesions in multiple areas in the brain, some of which included areas of hemorrhage.  Repeat CT head on 11/19/2024 showed a 2.8 x 2.3 x 2.7 cm area of hemorrhage in the left temporal lobe with moderate surrounding edema.  There was left frontal 2.0 x 1.6 x 1.8 cm ovoid increased attenuation lesion with lower attenuation nodule 1.3 x 1.2 x 0.9 cm.  There was a 1.2 cm ovoid area of heterogeneous attenuation with layering blood products in the left parietal lobe with mild to moderate adjacent edema.  Ill-defined increased attenuation left parietal lobe 1.4 x 1.4 x 1.4 cm with mild to moderate associated vasogenic edema.  Mildly ovoid area of increased attenuation in the head of the  thalamus on the right 1 cm.  Cortical area of increased attenuation right frontal lobe 7 mm with adjacent edema.  5 mm midline shift to the right unchanged.    Patient initiated high-dose steroids with dexamethasone 4 mg IV every 6 hours    CT chest abdomen and pelvis on 11/19/2024 showed 1.3 cm right lower lobe pulmonary nodule, innumerable hepatic lesions up to 2.6 x 1.4 cm on the right.  Enlarged prostate.  Indeterminant 1 cm left renal lesion most likely complex cyst.    On 11/19/2024 patient underwent CT-guided biopsy of liver lesion with pathology pending.    MRI brain pending    Patient was seen by neurosurgery, initiated Keppra 500 mg every 12 hours for seizure prophylaxis.  No plans for surgical intervention    Patient notes improvement in headache since admission.  He has some very minimal expressive aphasia.  No other complaints.      Past Medical History:   Diagnosis Date    Arthritis     Bradycardia     Cancer     Melanoma - surgery    High blood pressure     High cholesterol        Past Surgical History:   Procedure Laterality Date    SKIN CANCER EXCISION      Melanoma excision       SOCIAL HISTORY:   reports that he has never smoked. He has quit using smokeless tobacco.  His smokeless tobacco use included snuff. He reports that he does not currently use alcohol. He reports that he does not use drugs.    FAMILY HISTORY:  family history includes Cancer in his brother; No Known Problems in his father and mother.    ALLERGIES:  No Known Allergies    MEDICATIONS:  As listed in the electronic medical record.    Review of Systems  A comprehensive review of systems was obtained with pertinent positive findings as noted in the interval history above.  All other systems negative.    Vitals:    11/19/24 0744 11/19/24 0800 11/19/24 0900 11/19/24 1000   BP:  122/72 146/81 135/79   Pulse: 55 55 62 66   Resp:       Temp: 97.7 °F (36.5 °C)      TempSrc: Oral      SpO2: 94% 94% 91% 93%   Weight:       Height:            Physical Exam  Constitutional:       Appearance: He is well-developed.   Eyes:      Conjunctiva/sclera: Conjunctivae normal.   Neck:      Thyroid: No thyromegaly.   Cardiovascular:      Rate and Rhythm: Normal rate and regular rhythm.      Heart sounds: No murmur heard.     No friction rub. No gallop.   Pulmonary:      Effort: No respiratory distress.      Breath sounds: Normal breath sounds.   Abdominal:      General: Bowel sounds are normal. There is no distension.      Palpations: Abdomen is soft.      Tenderness: There is no abdominal tenderness.   Lymphadenopathy:      Head:      Right side of head: No submandibular adenopathy.      Cervical: No cervical adenopathy.      Upper Body:      Right upper body: No supraclavicular adenopathy.      Left upper body: No supraclavicular adenopathy.   Skin:     General: Skin is warm and dry.      Findings: No rash.   Neurological:      Mental Status: He is alert and oriented to person, place, and time.      Cranial Nerves: No cranial nerve deficit.      Motor: No abnormal muscle tone.      Deep Tendon Reflexes: Reflexes normal.   Psychiatric:         Behavior: Behavior normal.         DIAGNOSTIC DATA:    Results from last 7 days   Lab Units 11/19/24  0321 11/18/24  1845   WBC 10*3/mm3 6.69 7.04   HEMOGLOBIN g/dL 14.0 14.0   HEMATOCRIT % 41.3 40.9   PLATELETS 10*3/mm3 163 179      Results from last 7 days   Lab Units 11/19/24  0321 11/18/24  1845   SODIUM mmol/L 137 136   POTASSIUM mmol/L 4.3 4.7   CHLORIDE mmol/L 102 102   CO2 mmol/L 22.1 22.4   BUN mg/dL 17 18   CREATININE mg/dL 1.23 1.37*   CALCIUM mg/dL 9.1 9.4   BILIRUBIN mg/dL  --  2.2*   ALK PHOS U/L  --  174*   ALT (SGPT) U/L  --  17   AST (SGOT) U/L  --  22   GLUCOSE mg/dL 91 92            Assessment & Plan   ASSESSMENT:  This is a 79 y.o. male with:    *History of left neck stage IB ([id number]) melanoma  Patient has history of melanoma left neck.    Surgical excision on 7/30/2020 with pathology that showed  superficial spreading melanoma, Breslow thickness 1.1 mm, no ulceration, negative margins, mitotic rate 1 mitosis per square millimeter, no lymphovascular invasion.  [place] lymph node biopsy with 1 lymph node negative.  Stage IB ([id number]).    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    Initial CT head on 11/18/2024 with acute intraparenchymal hemorrhage left frontal, left temporal, left frontal parietal lobes and acute hemorrhage in the right lateral ventricle.    CT angiogram head and neck 11/18/2024 showed hyperdense hemorrhage in the left cerebral hemisphere, right caudate head, additional enhancing lesions in multiple areas in the brain, some of which included areas of hemorrhage.    Repeat CT head on 11/19/2024 showed a 2.8 x 2.3 x 2.7 cm area of hemorrhage in the left temporal lobe with moderate surrounding edema.  There was left frontal 2.0 x 1.6 x 1.8 cm ovoid increased attenuation lesion with lower attenuation nodule 1.3 x 1.2 x 0.9 cm.  There was a 1.2 cm ovoid area of heterogeneous attenuation with layering blood products in the left parietal lobe with mild to moderate adjacent edema.  Ill-defined increased attenuation left parietal lobe 1.4 x 1.4 x 1.4 cm with mild to moderate associated vasogenic edema.  Mildly ovoid area of increased attenuation in the head of the thalamus on the right 1 cm.  Cortical area of increased attenuation right frontal lobe 7 mm with adjacent edema.  5 mm midline shift to the right unchanged.  Patient initiated high-dose steroids with dexamethasone 4 mg IV every 6 hours  CT chest abdomen and pelvis on 11/19/2024 showed 1.3 cm right lower lobe pulmonary nodule, innumerable hepatic lesions up to 2.6 x 1.4 cm on the right.  Enlarged prostate.  Indeterminant 1 cm left renal lesion most likely complex cyst.  On 11/19/2024 patient underwent  CT-guided biopsy of liver lesion with pathology pending.  MRI brain pending  Patient with newly identified metastatic malignancy, presented with headache from multiple hemorrhagic brain metastases and subsequently found to have diffuse hepatic metastases as well.  Patient is status post CT-guided biopsy of the liver with pathology pending.  Given his history of melanoma, high suspicion for metastatic melanoma, particularly in light of the hemorrhagic brain lesions.  Continue high-dose dexamethasone 4 mg every 6 hours for cerebral edema.  We will await pathology and discuss recommendations for subsequent treatment based on these findings.  We will go ahead and consult radiation oncology in a.m. in regards to the brain metastases.    *Seizure prophylaxis  Keppra 500 mg twice daily    *GI prophylaxis  Pepcid 20 mg IV daily      PLAN:   Patient with metastatic malignancy with hemorrhagic brain metastases and diffuse liver metastases, high suspicion for metastatic melanoma  Pathology from CT-guided liver biopsy 11/19/2024 pending  Await pathology result for discussion regarding systemic therapy options  Consult radiation oncology in a.m. regarding brain metastases for consideration of palliative radiation therapy  Check LDH  Continue dexamethasone 4 mg IV every 6 hours  Patient continuing on seizure prophylaxis with Keppra 500 mg twice daily  Daily CBC, CMP    Discussed with patient and wife at bedside      Lobito Soto MD

## 2024-11-19 NOTE — PROGRESS NOTES
"      [facility]         Dr Nieves Sayied   LOS: 1 day   Patient Care Team:  Law Cole MD as PCP - General (Family Medicine)  Blane Greer MD as Consulting Physician (General Surgery)    Chief Complaint: Intracranial hemorrhage with multiple enhancing lesions in the brain suspicious for metastasis.  Other issues as listed below    Interval History: Events noted chart reviewed.  He actually looks very good.  His NIH is 0 and he is awake alert denies any headache nausea or vomiting.    REVIEW OF SYSTEMS:   CARDIOVASCULAR: No chest pain, chest pressure or chest discomfort. No palpitations or edema.   RESPIRATORY: No shortness of breath, cough or sputum.   GASTROINTESTINAL: No anorexia, nausea, vomiting or diarrhea. No abdominal pain or blood.   HEMATOLOGIC: No bleeding or bruising.     Ventilator/Non-Invasive Ventilation Settings (From admission, onward)      None              Vital Signs  Temp:  [96.8 °F (36 °C)-98.1 °F (36.7 °C)] 97.7 °F (36.5 °C)  Heart Rate:  [47-56] 55  Resp:  [16-20] 18  BP: (115-153)/(68-83) 125/76    Intake/Output Summary (Last 24 hours) at 11/19/2024 0950  Last data filed at 11/19/2024 0600  Gross per 24 hour   Intake --   Output 700 ml   Net -700 ml     Flowsheet Rows      Flowsheet Row First Filed Value   Admission Height 170.2 cm (67.01\") Documented at 11/18/2024 1745   Admission Weight 69.9 kg (154 lb 1.6 oz) Documented at 11/18/2024 1745            Physical Exam:  Patient is examined using the personal protective equipment as per guidelines from infection control for this particular patient as enacted.  Hand hygiene was performed before and after patient interaction.   General Appearance:    Alert, cooperative, in no acute distress.  Following simple commands  ENT Mallampati between 3 and 4 no nasal congestion  Neck midline trachea, no thyromegaly   Lungs:     Clear to auscultation, respirations regular, even and                  unlabored    Heart:    Regular " rhythm and normal rate, normal S1 and S2, no            murmur, no gallop, no rub, no click   Chest Wall:    No abnormalities observed   Abdomen:     Normal bowel sounds, no masses, no organomegaly, soft        nontender, nondistended, no guarding, no rebound                tenderness   Extremities:   Moves all extremities well, no edema, no cyanosis, no             redness  CNS no focal neurological deficits normal sensory exam  Skin no rashes no nodules  Musculoskeletal no cyanosis no clubbing normal range of motion     Results Review:        Results from last 7 days   Lab Units 11/19/24  0321 11/18/24  1845   SODIUM mmol/L 137 136   POTASSIUM mmol/L 4.3 4.7   CHLORIDE mmol/L 102 102   CO2 mmol/L 22.1 22.4   BUN mg/dL 17 18   CREATININE mg/dL 1.23 1.37*   GLUCOSE mg/dL 91 92   CALCIUM mg/dL 9.1 9.4         Results from last 7 days   Lab Units 11/19/24  0321 11/18/24  1845   WBC 10*3/mm3 6.69 7.04   HEMOGLOBIN g/dL 14.0 14.0   HEMATOCRIT % 41.3 40.9   PLATELETS 10*3/mm3 163 179     Results from last 7 days   Lab Units 11/18/24  1845   INR  1.11*   APTT seconds 46.7*     Results from last 7 days   Lab Units 11/19/24  0321   CHOLESTEROL mg/dL 144         Results from last 7 days   Lab Units 11/19/24  0321   CHOLESTEROL mg/dL 144   TRIGLYCERIDES mg/dL 102   HDL CHOL mg/dL 51   LDL CHOL mg/dL 74           I reviewed the patient's new clinical results.  I personally viewed and interpreted the patient's chest x-ray.        Medication Review:   dexAMETHasone, 4 mg, Intravenous, Q6H  levothyroxine, 50 mcg, Oral, Daily  mupirocin, 1 Application, Each Nare, BID  senna-docusate sodium, 2 tablet, Oral, BID  sodium chloride, 10 mL, Intravenous, Q12H        niCARdipine, 5-15 mg/hr  sodium chloride, 75 mL/hr, Last Rate: 75 mL/hr (11/19/24 0556)        ASSESSMENT:   Intracranial hemorrhage  Hypertension  Hyperlipidemia  Hypothyroidism  History of melanoma status post excision    PLAN:  Events noted chart reviewed  Reviewed CT head  and noted hyperdense hemorrhage in the left cerebral hemisphere with another focus of high attenuation in the right caudate head with multiple additional enhancing lesions suspicious for metastasis.  Awaiting neurosurgery evaluation  Continue IV Decadron  Neurochecks per protocol  Blood pressure systolic keep less than 150  Hydralazine Cardene available  OT PT consult  Blood glucose monitoring per ICU protocol  ICU core measures        Ezequiel Allen MD  11/19/24  09:50 EST

## 2024-11-20 DIAGNOSIS — I62.9 INTRACRANIAL HEMORRHAGE: Primary | ICD-10-CM

## 2024-11-20 DIAGNOSIS — Z98.890 HISTORY OF MELANOMA EXCISION: ICD-10-CM

## 2024-11-20 DIAGNOSIS — Z85.820 HISTORY OF MELANOMA EXCISION: ICD-10-CM

## 2024-11-20 LAB
ANION GAP SERPL CALCULATED.3IONS-SCNC: 12 MMOL/L (ref 5–15)
BASOPHILS # BLD AUTO: 0.01 10*3/MM3 (ref 0–0.2)
BASOPHILS NFR BLD AUTO: 0.1 % (ref 0–1.5)
BUN SERPL-MCNC: 31 MG/DL (ref 8–23)
BUN/CREAT SERPL: 22.5 (ref 7–25)
CALCIUM SPEC-SCNC: 9.2 MG/DL (ref 8.6–10.5)
CHLORIDE SERPL-SCNC: 105 MMOL/L (ref 98–107)
CO2 SERPL-SCNC: 21 MMOL/L (ref 22–29)
CREAT SERPL-MCNC: 1.38 MG/DL (ref 0.76–1.27)
DEPRECATED RDW RBC AUTO: 41.2 FL (ref 37–54)
EGFRCR SERPLBLD CKD-EPI 2021: 52.3 ML/MIN/1.73
EOSINOPHIL # BLD AUTO: 0 10*3/MM3 (ref 0–0.4)
EOSINOPHIL NFR BLD AUTO: 0 % (ref 0.3–6.2)
ERYTHROCYTE [DISTWIDTH] IN BLOOD BY AUTOMATED COUNT: 12 % (ref 12.3–15.4)
GLUCOSE BLDC GLUCOMTR-MCNC: 119 MG/DL (ref 70–130)
GLUCOSE SERPL-MCNC: 126 MG/DL (ref 65–99)
HCT VFR BLD AUTO: 40.7 % (ref 37.5–51)
HGB BLD-MCNC: 14.1 G/DL (ref 13–17.7)
IMM GRANULOCYTES # BLD AUTO: 0.08 10*3/MM3 (ref 0–0.05)
IMM GRANULOCYTES NFR BLD AUTO: 0.6 % (ref 0–0.5)
LYMPHOCYTES # BLD AUTO: 0.84 10*3/MM3 (ref 0.7–3.1)
LYMPHOCYTES NFR BLD AUTO: 6 % (ref 19.6–45.3)
MCH RBC QN AUTO: 32.3 PG (ref 26.6–33)
MCHC RBC AUTO-ENTMCNC: 34.6 G/DL (ref 31.5–35.7)
MCV RBC AUTO: 93.3 FL (ref 79–97)
MONOCYTES # BLD AUTO: 0.48 10*3/MM3 (ref 0.1–0.9)
MONOCYTES NFR BLD AUTO: 3.4 % (ref 5–12)
NEUTROPHILS NFR BLD AUTO: 12.56 10*3/MM3 (ref 1.7–7)
NEUTROPHILS NFR BLD AUTO: 89.9 % (ref 42.7–76)
PLATELET # BLD AUTO: 191 10*3/MM3 (ref 140–450)
PMV BLD AUTO: 13.2 FL (ref 6–12)
POTASSIUM SERPL-SCNC: 4.2 MMOL/L (ref 3.5–5.2)
RBC # BLD AUTO: 4.36 10*6/MM3 (ref 4.14–5.8)
SODIUM SERPL-SCNC: 138 MMOL/L (ref 136–145)
WBC NRBC COR # BLD AUTO: 13.97 10*3/MM3 (ref 3.4–10.8)

## 2024-11-20 PROCEDURE — 82948 REAGENT STRIP/BLOOD GLUCOSE: CPT

## 2024-11-20 PROCEDURE — 99231 SBSQ HOSP IP/OBS SF/LOW 25: CPT | Performed by: NURSE PRACTITIONER

## 2024-11-20 PROCEDURE — 99232 SBSQ HOSP IP/OBS MODERATE 35: CPT | Performed by: INTERNAL MEDICINE

## 2024-11-20 PROCEDURE — 85025 COMPLETE CBC W/AUTO DIFF WBC: CPT

## 2024-11-20 PROCEDURE — 80048 BASIC METABOLIC PNL TOTAL CA: CPT

## 2024-11-20 PROCEDURE — 63710000001 DEXAMETHASONE PER 0.25 MG: Performed by: INTERNAL MEDICINE

## 2024-11-20 PROCEDURE — 25010000002 DEXAMETHASONE SODIUM PHOSPHATE 20 MG/5ML SOLUTION

## 2024-11-20 RX ORDER — FAMOTIDINE 20 MG/1
20 TABLET, FILM COATED ORAL DAILY
Status: DISCONTINUED | OUTPATIENT
Start: 2024-11-20 | End: 2024-11-21 | Stop reason: HOSPADM

## 2024-11-20 RX ORDER — DEXAMETHASONE 4 MG/1
4 TABLET ORAL EVERY 6 HOURS SCHEDULED
Status: DISCONTINUED | OUTPATIENT
Start: 2024-11-20 | End: 2024-11-21 | Stop reason: HOSPADM

## 2024-11-20 RX ADMIN — Medication 10 ML: at 15:39

## 2024-11-20 RX ADMIN — GUAIFENESIN 600 MG: 600 TABLET, EXTENDED RELEASE ORAL at 20:43

## 2024-11-20 RX ADMIN — LEVOTHYROXINE SODIUM 50 MCG: 50 TABLET ORAL at 09:05

## 2024-11-20 RX ADMIN — DEXAMETHASONE 4 MG: 4 TABLET ORAL at 23:40

## 2024-11-20 RX ADMIN — CARVEDILOL 12.5 MG: 12.5 TABLET, FILM COATED ORAL at 09:05

## 2024-11-20 RX ADMIN — GUAIFENESIN 600 MG: 600 TABLET, EXTENDED RELEASE ORAL at 00:00

## 2024-11-20 RX ADMIN — DEXAMETHASONE 4 MG: 4 TABLET ORAL at 14:49

## 2024-11-20 RX ADMIN — DEXAMETHASONE 4 MG: 4 TABLET ORAL at 18:08

## 2024-11-20 RX ADMIN — CARVEDILOL 12.5 MG: 12.5 TABLET, FILM COATED ORAL at 20:42

## 2024-11-20 RX ADMIN — Medication 10 ML: at 20:44

## 2024-11-20 RX ADMIN — DEXAMETHASONE SODIUM PHOSPHATE 4 MG: 4 INJECTION, SOLUTION INTRAMUSCULAR; INTRAVENOUS at 11:01

## 2024-11-20 RX ADMIN — GUAIFENESIN 600 MG: 600 TABLET, EXTENDED RELEASE ORAL at 09:05

## 2024-11-20 RX ADMIN — LEVETIRACETAM 500 MG: 500 TABLET, FILM COATED ORAL at 20:42

## 2024-11-20 RX ADMIN — DEXAMETHASONE SODIUM PHOSPHATE 4 MG: 4 INJECTION, SOLUTION INTRAMUSCULAR; INTRAVENOUS at 05:00

## 2024-11-20 RX ADMIN — LEVETIRACETAM 500 MG: 500 TABLET, FILM COATED ORAL at 09:05

## 2024-11-20 RX ADMIN — TERAZOSIN 2 MG: 2 CAPSULE ORAL at 23:43

## 2024-11-20 NOTE — PROGRESS NOTES
"  [facility] NEUROSURGERY INTRACRANIAL PROGRESS NOTE    PATIENT IDENTIFICATION:   Name:  Leon Lemon      MRN:  [id number]     79 y.o.  male               CC:ICH, brain mass, HA      Subjective     Interval History:  had MRI brain and Ct guided liver biopsy.  Reports that he \"feels great\".  Denies headache.  Wife states that his speech seems back to normal rested well    ROS:  HEENT: No headache, no vision changes  GI: No nausea, vomiting  Neuro: No numbness, tingling, or weakness, no speech difficulties, no balance difficulties    Objective     Vital signs in last 24 hours:  Temp:  [97.5 °F (36.4 °C)-98 °F (36.7 °C)] 97.6 °F (36.4 °C)  Heart Rate:  [52-79] 57  Resp:  [16-18] 16  BP: ()/(53-83) 121/70      Intake/Output this shift:  No intake/output data recorded.      Intake/Output last 3 shifts:  I/O last 3 completed shifts:  In: 720 [P.O.:720]  Out: 2150 [Urine:2150]    LABS:  Results from last 7 days   Lab Units 11/20/24  0457 11/19/24  0321 11/18/24  1845   WBC 10*3/mm3 13.97* 6.69 7.04   HEMOGLOBIN g/dL 14.1 14.0 14.0   HEMATOCRIT % 40.7 41.3 40.9   PLATELETS 10*3/mm3 191 163 179     Results from last 7 days   Lab Units 11/20/24  0457 11/19/24  0321 11/18/24  1845   SODIUM mmol/L 138 137 136   POTASSIUM mmol/L 4.2 4.3 4.7   CHLORIDE mmol/L 105 102 102   CO2 mmol/L 21.0* 22.1 22.4   BUN mg/dL 31* 17 18   CREATININE mg/dL 1.38* 1.23 1.37*   CALCIUM mg/dL 9.2 9.1 9.4   BILIRUBIN mg/dL  --   --  2.2*   ALK PHOS U/L  --   --  174*   ALT (SGPT) U/L  --   --  17   AST (SGOT) U/L  --   --  22   GLUCOSE mg/dL 126* 91 92     Pathology 11/19- pending     IMAGING STUDIES:  MRI brain-multiple scattered supra and infratentorial enhancing brain masses with susceptibility effect consistent with hemorrhage and metastatic disease.  There is vasogenic edema present with mass effect and mild left to right midline shift that is stable.  No hydrocephalus.    I personally viewed and interpreted the patient's MRI brain.  Also " reviewed by discussed with Dr. Mccrary.    Meds reviewed/changed: Yes    Current Facility-Administered Medications:     sennosides-docusate (PERICOLACE) 8.6-50 MG per tablet 2 tablet, 2 tablet, Oral, BID **AND** polyethylene glycol (MIRALAX) packet 17 g, 17 g, Oral, Daily PRN **AND** bisacodyl (DULCOLAX) EC tablet 5 mg, 5 mg, Oral, Daily PRN **AND** bisacodyl (DULCOLAX) suppository 10 mg, 10 mg, Rectal, Daily PRN, Rashida Fitch APRN    carvedilol (COREG) tablet 12.5 mg, 12.5 mg, Oral, BID, Ezequiel Allen MD, 12.5 mg at 11/19/24 1331    dexAMETHasone sodium phosphate injection 4 mg, 4 mg, Intravenous, Q6H, Rashida Fitch APRN, 4 mg at 11/20/24 0500    famotidine (PEPCID) injection 20 mg, 20 mg, Intravenous, Daily, Rashida Renee APRN    guaiFENesin (MUCINEX) 12 hr tablet 600 mg, 600 mg, Oral, Q12H, Rashida Fitch APRN, 600 mg at 11/20/24 0000    hydrALAZINE (APRESOLINE) injection 10 mg, 10 mg, Intravenous, Q4H PRN, Rashida Fitch APRN, 10 mg at 11/19/24 1001    levETIRAcetam (KEPPRA) tablet 500 mg, 500 mg, Oral, BID, Rashida Renee APRN, 500 mg at 11/19/24 1331    levothyroxine (SYNTHROID, LEVOTHROID) tablet 50 mcg, 50 mcg, Oral, Daily, Rashida Fitch APRN, 50 mcg at 11/19/24 1002    mupirocin (BACTROBAN) 2 % nasal ointment 1 Application, 1 Application, Each Nare, BID, Rashida Fitch APRN    niCARdipine (CARDENE) 25 mg in 250 mL NS infusion kit, 5-15 mg/hr, Intravenous, Titrated, Rashida Fitch APRN    nitroglycerin (NITROSTAT) SL tablet 0.4 mg, 0.4 mg, Sublingual, Q5 Min PRN, Rashida Fitch APRN    ondansetron (ZOFRAN) injection 4 mg, 4 mg, Intravenous, Q6H PRN, Rashida Fitch APRN    [COMPLETED] Insert Peripheral IV, , , Once **AND** sodium chloride 0.9 % flush 10 mL, 10 mL, Intravenous, PRN, Aliya Warner MD    sodium chloride 0.9 % flush 10 mL, 10 mL, Intravenous, Q12H, Rashida Fitch APRN, 10 mL at 11/19/24 2019    sodium chloride 0.9 % flush 10 mL, 10 mL, Intravenous,  PRN, Rashida Fitch APRN    sodium chloride 0.9 % infusion 40 mL, 40 mL, Intravenous, PRN, Rashida Fitch APRN    terazosin (HYTRIN) capsule 2 mg, 2 mg, Oral, Nightly, Ezequiel Allen MD      Physical Exam:    General:   Awake, alert, oriented x3. Speech clear with no aphasia.  Sitting up in bed.  Pleasant and cooperative.  Wife at bedside  CN III IV VI:   Extraocular movements are full   CN VII:   Facial movements are symmetric. No weakness.  Motor:    Moving all extremities   Station and Gait:  Per PT note 11/19-mobilizing well at this time and appears at his baseline. Pt able to ambulate approx 200 ft w supervision. No overt unsteadiness noted. Pt noted to have some higher level balance deficits, but he reports this is baseline and this did not result in any overt unsteadiness or LOB. Pt returned to room at end of session. No further acute PT needs at this time.   Extremities:   Wearing SCD    Assessment & Plan     ASSESSMENT:      Intracranial hemorrhage    History of melanoma excision    Patient with history of melanoma presented with headache and some speech changes.  Found to have multifocal intracranial hemorrhages as well as masses.  MRI confirms multiple brain metastases with hemorrhagic conversion.  This is likely metastatic melanoma.  Had CT-guided liver biopsy yesterday with path pending.  Medical oncology service following and radiation oncology service consulted.  Patient feeling well today after initiation of steroids.  He has had no seizure activity and is tolerating the Keppra.  As he has multifocal metastasis in the brain and no specific symptomatic lesion, no role for neurosurgical intervention at this time.  Will defer to medical and radiation oncology service for treatment plan.  Will also defer steroid management to them.  Recommend indefinite Keppra.    PLAN:   Nothing to offer from MOON standpoint at this time  Recommend ongoing Keppra for seizure prevention  Defer steroids to oncology  "services    I discussed the patient's findings and my recommendations with patient, family, nursing staff, primary care team, and Dr. Mccrary    During patient visit, I utilized appropriate personal protective equipment including  gloves. Appropriate PPE was worn during the entire visit.  Hand hygiene was completed before and after.      LOS: 2 days       Rashida Renee, APRN  11/20/2024  08:57 EST    \"Dictated utilizing Dragon dictation\".      "

## 2024-11-20 NOTE — CASE MANAGEMENT/SOCIAL WORK
Discharge Planning Assessment  [facility]     Patient Name: Leon Lemon  MRN: [id number]  Today's Date: 11/20/2024    Admit Date: 11/18/2024    Plan: Home with spouse transporting   Discharge Needs Assessment       Row Name 11/20/24 1322       Living Environment    People in Home spouse    Name(s) of People in Home Gladis Lemon    Current Living Arrangements home    Potentially Unsafe Housing Conditions none    In the past 12 months has the electric, gas, oil, or water company threatened to shut off services in your home? No    Primary Care Provided by self    Provides Primary Care For no one    Family Caregiver if Needed spouse    Family Caregiver Names Gladis Lemon    Able to Return to Prior Arrangements yes       Resource/Environmental Concerns    Resource/Environmental Concerns none    Transportation Concerns none       Transportation Needs    In the past 12 months, has lack of transportation kept you from medical appointments or from getting medications? no    In the past 12 months, has lack of transportation kept you from meetings, work, or from getting things needed for daily living? No       Food Insecurity    Within the past 12 months, you worried that your food would run out before you got the money to buy more. Never true    Within the past 12 months, the food you bought just didn't last and you didn't have money to get more. Never true       Transition Planning    Patient/Family Anticipates Transition to home with family    Patient/Family Anticipated Services at Transition none    Transportation Anticipated family or friend will provide       Discharge Needs Assessment    Readmission Within the Last 30 Days no previous admission in last 30 days    Equipment Currently Used at Home bp cuff    Concerns to be Addressed denies needs/concerns at this time    Anticipated Changes Related to Illness none    Equipment Needed After Discharge other (see comments)  TBD, denies any needs today    Provided  Post Acute Provider List? N/A    N/A Provider List Comment Pt denied need for any services at DC.    Provided Post Acute Provider Quality & Resource List? N/A    N/A Quality & Resource List Comment Pt denied need for any services at DC.    Offered/Gave Vendor List other (see comments)  Pt denied need for any services at DC.                   Discharge Plan       Row Name 11/20/24 1322       Plan    Plan Home with spouse transporting    Patient/Family in Agreement with Plan yes    Plan Comments CCP met with pt & pt's wife, Gladis Lemon at bedside. CCP obtained permission from pt to speak in front of Gladis. CCP introduced self, role, & DC planning discussed. Face sheet information & pharmacy verified. Pt lives in house with Gladis. Pt reports he enters home thru basement garage so has 12 NICK & no other steps besides to/from basement. Pt denies issues with managing stairs. Pt drives. Pt is independent with ADLs. Pt reports use of the following DME: BP cuff. Pt denies having a living will & declined need for information. Pt agreeable to Meds to Beds. Pt denies issues with affording or managing medications, affording food, or affording utilities. Pt has not used home health nor been to a rehab facility. DC plan is return home. Pt stated Gladis will assist as needed & will provide transportation at DC. Denies any needs/equipment. CCP will follow. JOLEEN Cornell/MAGDALENE                  Continued Care and Services - Admitted Since 11/18/2024    No active coordination exists for this encounter.       Expected Discharge Date and Time       Expected Discharge Date Expected Discharge Time    Nov 22, 2024            Demographic Summary       Row Name 11/20/24 1322       General Information    Admission Type inpatient    Arrived From emergency department    Referral Source admission list    Reason for Consult discharge planning    Preferred Language English       Contact Information    Permission Granted to Share Info With case  manager;family/designee                   Functional Status       Row Name 11/20/24 1322       Functional Status    Usual Activity Tolerance good    Current Activity Tolerance good       Assessment of Health Literacy    How often do you have someone help you read hospital materials? Never    How often do you have problems learning about your medical condition because of difficulty understanding written information? Never    How often do you have a problem understanding what is told to you about your medical condition? Never    How confident are you filling out medical forms by yourself? Extremely    Health Literacy Excellent       Functional Status, IADL    Medications independent    Meal Preparation independent    Housekeeping independent    Laundry independent    Shopping independent    If for any reason you need help with day-to-day activities such as bathing, preparing meals, shopping, managing finances, etc., do you get the help you need? I don't need any help       Mental Status    General Appearance WDL WDL       Mental Status Summary    Recent Changes in Mental Status/Cognitive Functioning no changes       Employment/    Employment Status retired           Lili Arriaga, RN

## 2024-11-20 NOTE — PROGRESS NOTES
[facility] INPATIENT PROGRESS NOTE    Length of Stay:  2 days    CHIEF COMPLAINT:  Metastatic malignancy with hemorrhagic brain metastases and diffuse liver metastases, history of melanoma    SUBJECTIVE:   Patient overall doing quite well today, denies any further headaches, no neurologic symptoms currently.    ROS:  Review of Systems   A comprehensive review of systems was obtained with pertinent positive findings as noted in the interval history above.  All other systems negative.    OBJECTIVE:  Vitals:    11/20/24 0400 11/20/24 0500 11/20/24 0502 11/20/24 0600   BP: 94/68 124/73  121/70   Pulse: 56 53  57   Resp:  16     Temp:   97.5 °F (36.4 °C)    TempSrc:   Oral    SpO2: 93% 94%  95%   Weight:   66.7 kg (147 lb 0.8 oz)    Height:             PHYSICAL EXAMINATION:  General: Alert and orient x 3 no distress  Chest/Lungs: Clear to auscultation bilaterally  Heart: Regular rate and rhythm  Abdomen/GI: Soft nontender nondistended bowel sounds present  Extremities: No edema    DIAGNOSTIC DATA:  Results Review:     I reviewed the patient's new clinical results.    Results from last 7 days   Lab Units 11/20/24  0457 11/19/24  0321 11/18/24  1845   WBC 10*3/mm3 13.97* 6.69 7.04   HEMOGLOBIN g/dL 14.1 14.0 14.0   HEMATOCRIT % 40.7 41.3 40.9   PLATELETS 10*3/mm3 191 163 179      Results from last 7 days   Lab Units 11/20/24  0457 11/19/24  0321 11/18/24  1845   SODIUM mmol/L 138 137 136   POTASSIUM mmol/L 4.2 4.3 4.7   CHLORIDE mmol/L 105 102 102   CO2 mmol/L 21.0* 22.1 22.4   BUN mg/dL 31* 17 18   CREATININE mg/dL 1.38* 1.23 1.37*   CALCIUM mg/dL 9.2 9.1 9.4   BILIRUBIN mg/dL  --   --  2.2*   ALK PHOS U/L  --   --  174*   ALT (SGPT) U/L  --   --  17   AST (SGOT) U/L  --   --  22   GLUCOSE mg/dL 126* 91 92      Lab Results   Component Value Date    NEUTROABS 12.56 (H) 11/20/2024     Results from last 7 days   Lab Units 11/18/24  6513   INR  1.11*   APTT seconds 46.7*             Assessment & Plan    ASSESSMENT/PLAN:  This is a 79 y.o. male with:     *History of left neck stage IB ([id number]) melanoma  Patient has history of melanoma left neck.    Surgical excision on 7/30/2020 with pathology that showed superficial spreading melanoma, Breslow thickness 1.1 mm, no ulceration, negative margins, mitotic rate 1 mitosis per square millimeter, no lymphovascular invasion.  [place] lymph node biopsy with 1 lymph node negative.  Stage IB ([id number]).     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    Initial CT head on 11/18/2024 with acute intraparenchymal hemorrhage left frontal, left temporal, left frontal parietal lobes and acute hemorrhage in the right lateral ventricle.    CT angiogram head and neck 11/18/2024 showed hyperdense hemorrhage in the left cerebral hemisphere, right caudate head, additional enhancing lesions in multiple areas in the brain, some of which included areas of hemorrhage.    Repeat CT head on 11/19/2024 showed a 2.8 x 2.3 x 2.7 cm area of hemorrhage in the left temporal lobe with moderate surrounding edema.  There was left frontal 2.0 x 1.6 x 1.8 cm ovoid increased attenuation lesion with lower attenuation nodule 1.3 x 1.2 x 0.9 cm.  There was a 1.2 cm ovoid area of heterogeneous attenuation with layering blood products in the left parietal lobe with mild to moderate adjacent edema.  Ill-defined increased attenuation left parietal lobe 1.4 x 1.4 x 1.4 cm with mild to moderate associated vasogenic edema.  Mildly ovoid area of increased attenuation in the head of the thalamus on the right 1 cm.  Cortical area of increased attenuation right frontal lobe 7 mm with adjacent edema.  5 mm midline shift to the right unchanged.  Patient initiated high-dose steroids with dexamethasone 4 mg IV every 6 hours  CT chest abdomen and pelvis on 11/19/2024 showed 1.3 cm  right lower lobe pulmonary nodule, innumerable hepatic lesions up to 2.6 x 1.4 cm on the right.  Enlarged prostate.  Indeterminant 1 cm left renal lesion most likely complex cyst.  On 11/19/2024 patient underwent CT-guided biopsy of liver lesion with pathology pending.  MRI brain on 10/19/2024 with multiple supratentorial and infratentorial enhancing brain lesions many of which with associated hemorrhage and vasogenic edema.  Index lesion left temporal lobe 3 x 2.1 x 2.4 cm, index lesion right caudate 0.9 x 0.9 x 1 cm.  Left to right midline shift up to 0.5 cm, unchanged.  LDH on 11/19/2024 was 288  Patient with newly identified metastatic malignancy, presented with headache from multiple hemorrhagic brain metastases and subsequently found to have diffuse hepatic metastases as well.  Patient is status post CT-guided biopsy of the liver with pathology pending.  Given his history of melanoma, high suspicion for metastatic melanoma, particularly in light of the hemorrhagic brain lesions.  Continue high-dose dexamethasone 4 mg every 6 hours for cerebral edema, will change from IV to p.o today.  We will await pathology and discuss recommendations for subsequent treatment based on these findings.  Consulted radiation oncology in regards to palliative radiation for brain metastases.  Patient does note that he lives in [facility] and [facility] would be closer for him to receive care for both radiation and medical oncology in the future.  We will make referrals at discharge.  Given his overall improvement could likely consider initiating radiation therapy as outpatient at [facility] but will await opinion from radiation oncology here.     *Seizure prophylaxis  Keppra 500 mg twice daily     *GI prophylaxis  Pepcid 20 mg IV daily     *CKD3a  Creatinine appears to be in the 1.2-1.4 range    *Leukocytosis  Secondary to steroids       PLAN:   Patient with metastatic malignancy with hemorrhagic  brain metastases and diffuse liver metastases, high suspicion for metastatic melanoma  Pathology from CT-guided liver biopsy 11/19/2024 pending  Await pathology result for discussion regarding systemic therapy options  Radiation oncology consulted regarding brain metastases for consideration of palliative radiation therapy  Continue dexamethasone 4 mg every 6 hours, will change from IV to p.o.  Patient continuing on seizure prophylaxis with Keppra 500 mg twice daily  Patient lives in [facility], outpatient treatment will be more convenient at [facility] for both radiation and medical oncology after discharge.  We will make appropriate referrals when discharged.  Consider possible discharge home tomorrow  Daily CBC, CMP    Discussed with patient and wife at bedside,           Lobito Soto MD

## 2024-11-20 NOTE — CONSULTS
Patient Name:  Leon Lemon  YOB: 1945  MRN:  [id number]  Admit Date:  11/18/2024  Patient Care Team:  Law Cole MD as PCP - General (Family Medicine)  Blane Greer MD as Consulting Physician (General Surgery)  Mercy Milligan MD as Consulting Physician (Radiation Oncology)  Lobito Soto Jr., MD as Consulting Physician (Hematology and Oncology)      Subjective   History Present Illness     Chief Complaint   Patient presents with    Abnormal Imaging    Headache     This is a 79-year-old male with a past medical history of melanoma of the left neck status post excision on July 2022 came to the hospital with a headache.  He underwent CT of the head on 11/18 that showed acute intraparenchymal hemorrhage.  Neurosurgery was consulted and he was admitted to the ICU after being initiated on high-dose steroids.  He underwent a CT of the chest abdomen pelvis that showed a 1.3 cm right lower lobe pulmonary nodule, multiple hepatic lesions, and a 1 cm left renal lesion.  He underwent a CT-guided biopsy of liver the following day.  An MRI of the brain was also performed that showed multiple scattered supratentorial and infratentorial enhancing brain masses concern for hemorrhage and likely metastatic disease.      Personal History     Past Medical History:   Diagnosis Date    Arthritis     Bradycardia     Cancer     Melanoma - surgery    High blood pressure     High cholesterol      Past Surgical History:   Procedure Laterality Date    SKIN CANCER EXCISION      Melanoma excision     Family History   Problem Relation Age of Onset    Cancer Brother     No Known Problems Mother     No Known Problems Father      Social History     Tobacco Use    Smoking status: Never    Smokeless tobacco: Former     Types: Snuff   Vaping Use    Vaping status: Never Used   Substance Use Topics    Alcohol use: Not Currently    Drug use: Never     No current facility-administered medications on file prior to  encounter.     Current Outpatient Medications on File Prior to Encounter   Medication Sig Dispense Refill    atorvastatin (LIPITOR) 10 MG tablet atorvastatin 10 mg oral tablet take 1 tablet (10 mg) by oral route once daily at bedtime   Active      carvedilol (COREG) 25 MG tablet Take 0.5 tablets by mouth 2 (Two) Times a Day. 90 tablet 3    levothyroxine (SYNTHROID, LEVOTHROID) 50 MCG tablet Take 1 tablet by mouth Daily.      doxazosin (CARDURA) 2 MG tablet Take 1 tablet by mouth Daily.       No Known Allergies    Objective    Objective     Vital Signs  Temp:  [97.3 °F (36.3 °C)-98.1 °F (36.7 °C)] 97.3 °F (36.3 °C)  Heart Rate:  [52-73] 54  Resp:  [16-18] 16  BP: ()/(53-81) 130/72  SpO2:  [92 %-96 %] 95 %  on   ;   Device (Oxygen Therapy): room air  Body mass index is 23.03 kg/m².    Physical Exam  Constitutional:       General: He is not in acute distress.  HENT:      Head: Normocephalic and atraumatic.   Cardiovascular:      Rate and Rhythm: Normal rate and regular rhythm.   Pulmonary:      Effort: Pulmonary effort is normal. No respiratory distress.   Abdominal:      General: There is no distension.      Palpations: Abdomen is soft.      Tenderness: There is no abdominal tenderness.   Neurological:      Mental Status: He is alert and oriented to person, place, and time.         Results Review:  I reviewed the patient's new clinical results.  I reviewed the patient's new imaging results and agree with the interpretation.  I reviewed the patient's other test results and agree with the interpretation  I personally viewed and interpreted the patient's EKG/Telemetry data  Discussed with ED provider.    Lab Results (last 24 hours)       Procedure Component Value Units Date/Time    POC Glucose Once [[id number]]  (Abnormal) Collected: 11/19/24 1821    Specimen: Blood Updated: 11/19/24 1824     Glucose 186 mg/dL     POC Glucose Once [[id number]]  (Normal) Collected: 11/19/24 2312    Specimen: Blood Updated: 11/19/24  2313     Glucose 126 mg/dL     Basic Metabolic Panel [[id number]]  (Abnormal) Collected: 11/20/24 0457    Specimen: Blood Updated: 11/20/24 0541     Glucose 126 mg/dL      BUN 31 mg/dL      Creatinine 1.38 mg/dL      Sodium 138 mmol/L      Potassium 4.2 mmol/L      Chloride 105 mmol/L      CO2 21.0 mmol/L      Calcium 9.2 mg/dL      BUN/Creatinine Ratio 22.5     Anion Gap 12.0 mmol/L      eGFR 52.3 mL/min/1.73     Narrative:      GFR Normal >60  Chronic Kidney Disease <60  Kidney Failure <15    The GFR formula is only valid for adults with stable renal function between ages 18 and 70.    CBC & Differential [[id number]]  (Abnormal) Collected: 11/20/24 0457    Specimen: Blood Updated: 11/20/24 0523    Narrative:      The following orders were created for panel order CBC & Differential.  Procedure                               Abnormality         Status                     ---------                               -----------         ------                     CBC Auto Differential[[id number]]        Abnormal            Final result                 Please view results for these tests on the individual orders.    CBC Auto Differential [[id number]]  (Abnormal) Collected: 11/20/24 0457    Specimen: Blood Updated: 11/20/24 0523     WBC 13.97 10*3/mm3      RBC 4.36 10*6/mm3      Hemoglobin 14.1 g/dL      Hematocrit 40.7 %      MCV 93.3 fL      MCH 32.3 pg      MCHC 34.6 g/dL      RDW 12.0 %      RDW-SD 41.2 fl      MPV 13.2 fL      Platelets 191 10*3/mm3      Neutrophil % 89.9 %      Lymphocyte % 6.0 %      Monocyte % 3.4 %      Eosinophil % 0.0 %      Basophil % 0.1 %      Immature Grans % 0.6 %      Neutrophils, Absolute 12.56 10*3/mm3      Lymphocytes, Absolute 0.84 10*3/mm3      Monocytes, Absolute 0.48 10*3/mm3      Eosinophils, Absolute 0.00 10*3/mm3      Basophils, Absolute 0.01 10*3/mm3      Immature Grans, Absolute 0.08 10*3/mm3             No results found for this or any previous visit.    Imaging Results (Last 24  Hours)       Procedure Component Value Units Date/Time    CT Needle Biopsy Liver [[id number]] Collected: 11/19/24 1603    Specimen: Tissue Updated: 11/20/24 1638    Narrative:      CT-GUIDED BIOPSY OF THE LIVER 11/19/2024     HISTORY: Multiple liver lesions.     TECHNIQUE: After signed informed consent was obtained patient was  prepped and draped in the supine position. Lidocaine was used for local  anesthesia.     18-gauge biopsy device was used to obtain core specimens of a hypodense  lesion posterior to the gallbladder in the right hepatic lobe.  Confirmatory images were obtained.     No sedation was used during the procedure.     Radiation dose reduction techniques were utilized, including automated  exposure control and exposure modulation based on body size.        This report was finalized on 11/20/2024 4:35 PM by Dr. Amado Crystal M.D on Workstation: [id number]       MRI Brain With & Without Contrast [[id number]] Collected: 11/19/24 2245     Updated: 11/19/24 2306    Narrative:      MRI BRAIN WITH AND WITHOUT CONTRAST     HISTORY:    Intracranial hemorrhage, brain mass, history of melanoma     COMPARISON: Multiple head CTs dating back to 11/18/2024     TECHNIQUE: Multiplanar, multisequence MR imaging of the brain was  performed with and without intravenous contrast.       Impression:      FINDINGS AND IMPRESSION:  There are multiple scattered supratentorial and infratentorial enhancing  brain masses, many of which demonstrate associated susceptibility effect  suggestive of hemorrhage, and likely represent metastatic disease. Areas  of vasogenic edema are present. Index peripheral enhancing mass within  the anterior aspect of the left temporal lobe measures 3 x 2.1 x 2.4 cm  and demonstrates internal susceptibility effect with what appears to be  an associated fluid-fluid level suggestive of hemorrhage. Index avidly  enhancing lesion within the right caudate measures 0.9 x 0.9 x 1 cm.     There appears  to be left right midline shift measuring up to  approximately 0.5 cm, as before. No hydrocephalus.     Findings suggestive of moderate chronic ischemic small vessel  degenerative areas.        This report was finalized on 11/19/2024 11:03 PM by Dr. Marin Benoit M.D on Workstation: [id number]                   ECG 12 Lead Rhythm Change   Final Result   HEART RATE=51  bpm   RR Tckbaids=0192  ms   AR Hxjxphvf=260  ms   P Horizontal Axis=-1  deg   P Front Axis=52  deg   QRSD Gouqxtvx=577  ms   QT Zfrsqcde=692  ms   JQkC=171  ms   QRS Axis=22  deg   T Wave Axis=37  deg   - OTHERWISE NORMAL ECG -   Sinus rhythm   Abnormal R-wave progression, late transition   No Prior Tracing for Comparison   Electronically Signed By: Nikole Thomas ([facility]) 2024-11-18 19:55:41   Date and Time of Study:2024-11-18 19:12:28      Telemetry Scan   Final Result                 Assessment/Plan     Active Hospital Problems    Diagnosis  POA    **Intracranial hemorrhage [I62.9]  Yes    History of melanoma excision [Z98.890, Z85.820]  Not Applicable      Resolved Hospital Problems   No resolved problems to display.     Intracranial hemorrhage  Admitted initially to the ICU.  Evaluated by neurosurgery.   started on high-dose steroids  MRI of the brain confirms multiple brain metastases with hemorrhagic conversion.    Metastatic melanoma with metastatic disease to the brain and liver and possibly lungs  liver biopsy still pending however this is the most likely etiology.  1.3 cm lung nodule also noted      I discussed the patient's findings and my recommendations with patient, family, and consulting provider.    VTE Prophylaxis - SCDs.  Code Status - Full code.       Dann Mart MD  [facility]  11/20/24  18:14 EST

## 2024-11-20 NOTE — CONSULTS
DIAGNOSIS and REASON FOR CONSULTATION: hemorrhagic brain metastases  -  for advice and recommendations for this diagnosis    Referring Provider:  Aliya Warner MD    HISTORY OF PRESENT ILLNESS:  The patient is a 79 y.o. year old male with a history of melanoma pT2aN0 s/p resection and sln biopys in 2020.  He presented to the ER at [facility] on 11/18/24 with a 1 week hx of headaches.  He had a CT head which showed acute intraparenchymal hemorrhage is noted in the left frontal, left temporal and left frontal parietallobes. Findings may be secondary to hemorrhagic conversion of infarctions, amyloid angiopathy,hypertensive hemorrhages, hemorrhagic metastases,previous trauma and bleeding diathesis.  Acute hemorrhage in the right lateral ventricle.  He had a CT angiogram of the head and neck which showed .Acute intraparenchymal hemorrhage is noted in the left frontal, left temporal and left frontal parietallobes. Findings may be secondary to hemorrhagic conversion of infarctions, amyloid angiopathy,hypertensive hemorrhages, hemorrhagic metastases,previous trauma and bleeding diathesis..Acute hemorrhage in the right lateral ventricle.    He was transferred to [facility] and had a CT chest, abdomen and pelvis on 11/19/24 which showed 1.3 cm right lower lobe pulmonary nodule, Innumerable low attenuation hepatic masses measure as large as2.6 x 1.4 cm in the right lobe.1 cm intermediatedensity left renal cystic lesion is indeterminate.      He had a CT guided biopsy of a liver lesion yesterday with pathology still pending.      He had a brain mri on 11/19/24 which showed multiple scattered supratentorial and infratentorial enhancing brain masses, many of which demonstrate associated susceptibility effectsuggestive of hemorrhage, and likely represent metastatic disease. Areas of vasogenic edema are present. Index peripheral enhancing mass withinthe anterior aspect of the left temporal lobe measures 3 x 2.1 x 2.4  cmand demonstrates internal susceptibility effect with what appears to bean associated fluid-fluid level suggestive of hemorrhage. Index avidlyenhancing lesion within the right caudate measures 0.9 x 0.9 x 1 cm.There appears to be left right midline shift measuring up toapproximately 0.5 cm.    He was started on dexamethasone and keppra.  He has been evaluated by neurosurgery with no plans for surgical intervention.     I was asked to see the patient at the request of the referring provider noted above for advice and recommendations regarding this diagnosis.    Objective     Past Medical History: he  has a past medical history of Arthritis, Bradycardia, Cancer, High blood pressure, and High cholesterol.    Past Surgical History:  he has a past surgical history that includes Skin cancer excision.    Meds:    Current Facility-Administered Medications:     sennosides-docusate (PERICOLACE) 8.6-50 MG per tablet 2 tablet, 2 tablet, Oral, BID **AND** polyethylene glycol (MIRALAX) packet 17 g, 17 g, Oral, Daily PRN **AND** bisacodyl (DULCOLAX) EC tablet 5 mg, 5 mg, Oral, Daily PRN **AND** bisacodyl (DULCOLAX) suppository 10 mg, 10 mg, Rectal, Daily PRN, Rashida Fitch APRN    carvedilol (COREG) tablet 12.5 mg, 12.5 mg, Oral, BID, Ezequiel Allen MD, 12.5 mg at 11/20/24 0905    dexAMETHasone (DECADRON) tablet 4 mg, 4 mg, Oral, Q6H, Lobito Soto Jr., MD, 4 mg at 11/20/24 1449    famotidine (PEPCID) tablet 20 mg, 20 mg, Oral, Daily, Rashida Renee APRN    guaiFENesin (MUCINEX) 12 hr tablet 600 mg, 600 mg, Oral, Q12H, Rashida Fitch APRN, 600 mg at 11/20/24 0905    hydrALAZINE (APRESOLINE) injection 10 mg, 10 mg, Intravenous, Q4H PRN, Rashida Fitch APRN, 10 mg at 11/19/24 1001    levETIRAcetam (KEPPRA) tablet 500 mg, 500 mg, Oral, BID, Rashida Renee APRN, 500 mg at 11/20/24 0905    levothyroxine (SYNTHROID, LEVOTHROID) tablet 50 mcg, 50 mcg, Oral, Daily, Rashida Fitch APRN, 50 mcg at 11/20/24 0905     mupirocin (BACTROBAN) 2 % nasal ointment 1 Application, 1 Application, Each Nare, BID, Rashida Fitch APRN    niCARdipine (CARDENE) 25 mg in 250 mL NS infusion kit, 5-15 mg/hr, Intravenous, Titrated, Rashida Fitch APRN    nitroglycerin (NITROSTAT) SL tablet 0.4 mg, 0.4 mg, Sublingual, Q5 Min PRN, Rashida Fitch APRN    ondansetron (ZOFRAN) injection 4 mg, 4 mg, Intravenous, Q6H PRN, Rashida Fitch APRN    [COMPLETED] Insert Peripheral IV, , , Once **AND** sodium chloride 0.9 % flush 10 mL, 10 mL, Intravenous, PRN, Aliya Warner MD    sodium chloride 0.9 % flush 10 mL, 10 mL, Intravenous, Q12H, Rashida Fitch APRN, 10 mL at 11/19/24 2019    sodium chloride 0.9 % flush 10 mL, 10 mL, Intravenous, PRN, Rashida Fitch APRN    sodium chloride 0.9 % infusion 40 mL, 40 mL, Intravenous, PRN, Rashida Fitch APRN    terazosin (HYTRIN) capsule 2 mg, 2 mg, Oral, Nightly, Ezequiel Allen MD    Allergies:  No Known Allergies    Family History:  his family history includes Cancer in his brother; No Known Problems in his father and mother.    Social History:  he  reports that he has never smoked. He has quit using smokeless tobacco.  His smokeless tobacco use included snuff. He reports that he does not currently use alcohol. He reports that he does not use drugs.    Pertinent Findings on   Review of Systems   Constitutional:  Positive for fatigue.   Respiratory: Negative.     Musculoskeletal:  Positive for arthralgias.   Neurological:  Positive for headaches. Negative for numbness and seizures.   Psychiatric/Behavioral: Negative.     :  Subjective     Vitals:    11/20/24 0800 11/20/24 0900 11/20/24 1000 11/20/24 1100   BP: 118/76 130/75 111/61 118/67   Pulse: 55 73 67 64   Resp:       Temp:    98.1 °F (36.7 °C)   TempSrc:    Oral   SpO2: 94% 96% 95% 94%   Weight:       Height:           Performance Status: (2) Ambulatory and capable of self care, unable to carry out work activity, up and about > 50% or waking  hours    Pertinent Findings on  Physical Exam  Constitutional:       Appearance: Normal appearance.   HENT:      Head: Atraumatic.   Eyes:      Extraocular Movements: Extraocular movements intact.   Musculoskeletal:         General: Normal range of motion.   Neurological:      General: No focal deficit present.      Mental Status: He is alert and oriented to person, place, and time.      Motor: No weakness.   Psychiatric:         Mood and Affect: Mood normal.         Behavior: Behavior normal.     :       Assessment: 78 yo male with hx of melanoma now with numerous brain and liver metastases consistent with likely metastatic melanoma.      Plan:  I have reviewed his imaging.  I discussed with the patient and his wife my recommendation for palliative whole brain radiation. He lives in [place], [place] which is much closer to [facility] and they are requesting treatment there.  I have scheduled him to see Dr. Gonzalez Rodriguez with radiation oncology at [facility] on Friday, November 22 at 7:45 am.  They voiced understanding and believe he will be be discharged tomorrow.  I asked them to call with any questions or concerns.     I spent greater than 50 mins (01848) on the unit and of that time 40 minutes  were spent in counseling and coordination of care, including my review of imaging and pathology; indications, goals, logistics, alternatives and risks - both common and rare - for my recommendations as well as surveillance and potential outcomes.   Objective   I spent greater than 50 mins (86929) on the unit and of that time 40 minutes  in counseling and coordination of care, including my review of imaging and pathology; indications, goals, logistics, alternatives and risks - both common and rare - for my recommendations as well as surveillance and potential outcomes. NCCN Guidelines were consulted, discussed with the patient and used to make the above recommendations.

## 2024-11-20 NOTE — PROGRESS NOTES
"      [facility]         Dr Nieves Sayied   LOS: 2 days   Patient Care Team:  Law Cole MD as PCP - General (Family Medicine)  Blane Greer MD as Consulting Physician (General Surgery)  Mercy Milligan MD as Consulting Physician (Radiation Oncology)  Lobito Soto Jr., MD as Consulting Physician (Hematology and Oncology)    Chief Complaint: Intracranial hemorrhage with multiple enhancing lesions in the brain suspicious for metastasis.  Other issues as listed below    Interval History: Status post liver biopsy complains of some postop pain.  This morning resting comfortably no headache or double vision.    REVIEW OF SYSTEMS:   CARDIOVASCULAR: No chest pain, chest pressure or chest discomfort. No palpitations or edema.   RESPIRATORY: No shortness of breath, cough or sputum.   GASTROINTESTINAL: No anorexia, nausea, vomiting or diarrhea. No abdominal pain or blood.   HEMATOLOGIC: No bleeding or bruising.     Ventilator/Non-Invasive Ventilation Settings (From admission, onward)      None              Vital Signs  Temp:  [97.5 °F (36.4 °C)-98 °F (36.7 °C)] 97.6 °F (36.4 °C)  Heart Rate:  [52-79] 73  Resp:  [16-18] 16  BP: ()/(53-83) 130/75    Intake/Output Summary (Last 24 hours) at 11/20/2024 1053  Last data filed at 11/20/2024 0000  Gross per 24 hour   Intake 720 ml   Output 1450 ml   Net -730 ml     Flowsheet Rows      Flowsheet Row First Filed Value   Admission Height 170.2 cm (67.01\") Documented at 11/18/2024 1745   Admission Weight 69.9 kg (154 lb 1.6 oz) Documented at 11/18/2024 1745            Physical Exam:  Patient is examined using the personal protective equipment as per guidelines from infection control for this particular patient as enacted.  Hand hygiene was performed before and after patient interaction.   General Appearance:    Alert, cooperative, in no acute distress.  Following simple commands  ENT Mallampati between 3 and 4 no nasal congestion  Neck midline " trachea, no thyromegaly   Lungs:     Clear to auscultation, respirations regular, even and                  unlabored    Heart:    Regular rhythm and normal rate, normal S1 and S2, no            murmur, no gallop, no rub, no click   Chest Wall:    No abnormalities observed   Abdomen:     Normal bowel sounds, no masses, no organomegaly, soft        nontender, nondistended, no guarding, no rebound                tenderness   Extremities:   Moves all extremities well, no edema, no cyanosis, no             redness  CNS no focal neurological deficits normal sensory exam  Skin no rashes no nodules  Musculoskeletal no cyanosis no clubbing normal range of motion     Results Review:        Results from last 7 days   Lab Units 11/20/24  0457 11/19/24  0321 11/18/24  1845   SODIUM mmol/L 138 137 136   POTASSIUM mmol/L 4.2 4.3 4.7   CHLORIDE mmol/L 105 102 102   CO2 mmol/L 21.0* 22.1 22.4   BUN mg/dL 31* 17 18   CREATININE mg/dL 1.38* 1.23 1.37*   GLUCOSE mg/dL 126* 91 92   CALCIUM mg/dL 9.2 9.1 9.4         Results from last 7 days   Lab Units 11/20/24  0457 11/19/24  0321 11/18/24  1845   WBC 10*3/mm3 13.97* 6.69 7.04   HEMOGLOBIN g/dL 14.1 14.0 14.0   HEMATOCRIT % 40.7 41.3 40.9   PLATELETS 10*3/mm3 191 163 179     Results from last 7 days   Lab Units 11/18/24  1845   INR  1.11*   APTT seconds 46.7*     Results from last 7 days   Lab Units 11/19/24  0321   CHOLESTEROL mg/dL 144         Results from last 7 days   Lab Units 11/19/24  0321   CHOLESTEROL mg/dL 144   TRIGLYCERIDES mg/dL 102   HDL CHOL mg/dL 51   LDL CHOL mg/dL 74           I reviewed the patient's new clinical results.  I personally viewed and interpreted the patient's chest x-ray.        Medication Review:   carvedilol, 12.5 mg, Oral, BID  dexAMETHasone, 4 mg, Intravenous, Q6H  famotidine, 20 mg, Intravenous, Daily  guaiFENesin, 600 mg, Oral, Q12H  levETIRAcetam, 500 mg, Oral, BID  levothyroxine, 50 mcg, Oral, Daily  mupirocin, 1 Application, Each Nare,  BID  senna-docusate sodium, 2 tablet, Oral, BID  sodium chloride, 10 mL, Intravenous, Q12H  terazosin, 2 mg, Oral, Nightly        niCARdipine, 5-15 mg/hr        ASSESSMENT:   Intracranial hemorrhage  Hypertension  Hyperlipidemia  Hypothyroidism  History of melanoma status post excision    PLAN:  Status post CT-guided liver biopsy.  Follow-up on path results.  Reviewed CT head and noted hyperdense hemorrhage in the left cerebral hemisphere with another focus of high attenuation in the right caudate head with multiple additional enhancing lesions suspicious for metastasis.  Appreciate input from neurosurgery.  No plans for surgical intervention  Continue IV Decadron  Neurochecks per protocol  Blood pressure systolic keep less than 150  Transfer patient out of the ICU  Hospitalist for medical managementOT PT consult  Blood glucose monitoring per ICU protocol  ICU core measures        Ezequiel Allen MD  11/20/24  10:53 EST

## 2024-11-21 ENCOUNTER — READMISSION MANAGEMENT (OUTPATIENT)
Dept: CALL CENTER | Facility: HOSPITAL | Age: 79
End: 2024-11-21
Payer: MEDICARE

## 2024-11-21 VITALS
BODY MASS INDEX: 23.08 KG/M2 | OXYGEN SATURATION: 94 % | TEMPERATURE: 97.7 F | DIASTOLIC BLOOD PRESSURE: 77 MMHG | SYSTOLIC BLOOD PRESSURE: 121 MMHG | WEIGHT: 147.05 LBS | RESPIRATION RATE: 18 BRPM | HEIGHT: 67 IN | HEART RATE: 52 BPM

## 2024-11-21 DIAGNOSIS — Z85.820 HISTORY OF MELANOMA: ICD-10-CM

## 2024-11-21 DIAGNOSIS — K76.9 LIVER LESION: Primary | ICD-10-CM

## 2024-11-21 DIAGNOSIS — C79.9 MULTIPLE LESIONS OF METASTATIC MALIGNANCY: ICD-10-CM

## 2024-11-21 LAB
ALBUMIN SERPL-MCNC: 3.9 G/DL (ref 3.5–5.2)
ALBUMIN/GLOB SERPL: 1.3 G/DL
ALP SERPL-CCNC: 150 U/L (ref 39–117)
ALT SERPL W P-5'-P-CCNC: 22 U/L (ref 1–41)
ANION GAP SERPL CALCULATED.3IONS-SCNC: 12.6 MMOL/L (ref 5–15)
AST SERPL-CCNC: 24 U/L (ref 1–40)
BASOPHILS # BLD AUTO: 0.02 10*3/MM3 (ref 0–0.2)
BASOPHILS NFR BLD AUTO: 0.1 % (ref 0–1.5)
BILIRUB SERPL-MCNC: 1.5 MG/DL (ref 0–1.2)
BUN SERPL-MCNC: 42 MG/DL (ref 8–23)
BUN/CREAT SERPL: 29.8 (ref 7–25)
CALCIUM SPEC-SCNC: 8.7 MG/DL (ref 8.6–10.5)
CHLORIDE SERPL-SCNC: 104 MMOL/L (ref 98–107)
CO2 SERPL-SCNC: 20.4 MMOL/L (ref 22–29)
CREAT SERPL-MCNC: 1.41 MG/DL (ref 0.76–1.27)
CYTO UR: NORMAL
DEPRECATED RDW RBC AUTO: 42.2 FL (ref 37–54)
EGFRCR SERPLBLD CKD-EPI 2021: 51 ML/MIN/1.73
EOSINOPHIL # BLD AUTO: 0 10*3/MM3 (ref 0–0.4)
EOSINOPHIL NFR BLD AUTO: 0 % (ref 0.3–6.2)
ERYTHROCYTE [DISTWIDTH] IN BLOOD BY AUTOMATED COUNT: 12.4 % (ref 12.3–15.4)
GLOBULIN UR ELPH-MCNC: 2.9 GM/DL
GLUCOSE BLDC GLUCOMTR-MCNC: 119 MG/DL (ref 70–130)
GLUCOSE BLDC GLUCOMTR-MCNC: 135 MG/DL (ref 70–130)
GLUCOSE BLDC GLUCOMTR-MCNC: 136 MG/DL (ref 70–130)
GLUCOSE SERPL-MCNC: 123 MG/DL (ref 65–99)
HCT VFR BLD AUTO: 40.3 % (ref 37.5–51)
HGB BLD-MCNC: 14.2 G/DL (ref 13–17.7)
IMM GRANULOCYTES # BLD AUTO: 0.12 10*3/MM3 (ref 0–0.05)
IMM GRANULOCYTES NFR BLD AUTO: 0.7 % (ref 0–0.5)
LAB AP CASE REPORT: NORMAL
LAB AP CLINICAL INFORMATION: NORMAL
LAB AP DIAGNOSIS COMMENT: NORMAL
LAB AP SPECIAL STAINS: NORMAL
LYMPHOCYTES # BLD AUTO: 0.57 10*3/MM3 (ref 0.7–3.1)
LYMPHOCYTES NFR BLD AUTO: 3.2 % (ref 19.6–45.3)
MCH RBC QN AUTO: 32.8 PG (ref 26.6–33)
MCHC RBC AUTO-ENTMCNC: 35.2 G/DL (ref 31.5–35.7)
MCV RBC AUTO: 93.1 FL (ref 79–97)
MONOCYTES # BLD AUTO: 0.52 10*3/MM3 (ref 0.1–0.9)
MONOCYTES NFR BLD AUTO: 3 % (ref 5–12)
NEUTROPHILS NFR BLD AUTO: 16.33 10*3/MM3 (ref 1.7–7)
NEUTROPHILS NFR BLD AUTO: 93 % (ref 42.7–76)
PATH REPORT.FINAL DX SPEC: NORMAL
PATH REPORT.GROSS SPEC: NORMAL
PLATELET # BLD AUTO: 190 10*3/MM3 (ref 140–450)
PMV BLD AUTO: 13.7 FL (ref 6–12)
POTASSIUM SERPL-SCNC: 4.4 MMOL/L (ref 3.5–5.2)
PROT SERPL-MCNC: 6.8 G/DL (ref 6–8.5)
RBC # BLD AUTO: 4.33 10*6/MM3 (ref 4.14–5.8)
SODIUM SERPL-SCNC: 137 MMOL/L (ref 136–145)
WBC NRBC COR # BLD AUTO: 17.56 10*3/MM3 (ref 3.4–10.8)

## 2024-11-21 PROCEDURE — 80053 COMPREHEN METABOLIC PANEL: CPT | Performed by: INTERNAL MEDICINE

## 2024-11-21 PROCEDURE — 63710000001 DEXAMETHASONE PER 0.25 MG: Performed by: INTERNAL MEDICINE

## 2024-11-21 PROCEDURE — 99232 SBSQ HOSP IP/OBS MODERATE 35: CPT | Performed by: INTERNAL MEDICINE

## 2024-11-21 PROCEDURE — 82948 REAGENT STRIP/BLOOD GLUCOSE: CPT

## 2024-11-21 PROCEDURE — 85025 COMPLETE CBC W/AUTO DIFF WBC: CPT | Performed by: INTERNAL MEDICINE

## 2024-11-21 RX ORDER — DEXAMETHASONE 4 MG/1
4 TABLET ORAL EVERY 6 HOURS SCHEDULED
Qty: 56 TABLET | Refills: 0 | Status: SHIPPED | OUTPATIENT
Start: 2024-11-21 | End: 2024-11-26 | Stop reason: ALTCHOICE

## 2024-11-21 RX ORDER — LEVETIRACETAM 500 MG/1
500 TABLET ORAL 2 TIMES DAILY
Qty: 60 TABLET | Refills: 0 | Status: SHIPPED | OUTPATIENT
Start: 2024-11-21 | End: 2024-11-27

## 2024-11-21 RX ORDER — FAMOTIDINE 20 MG/1
20 TABLET, FILM COATED ORAL DAILY
Qty: 30 TABLET | Refills: 0 | Status: SHIPPED | OUTPATIENT
Start: 2024-11-22

## 2024-11-21 RX ADMIN — DEXAMETHASONE 4 MG: 4 TABLET ORAL at 06:02

## 2024-11-21 RX ADMIN — Medication 10 ML: at 08:25

## 2024-11-21 RX ADMIN — DEXAMETHASONE 4 MG: 4 TABLET ORAL at 13:19

## 2024-11-21 RX ADMIN — LEVOTHYROXINE SODIUM 50 MCG: 50 TABLET ORAL at 08:23

## 2024-11-21 RX ADMIN — CARVEDILOL 12.5 MG: 12.5 TABLET, FILM COATED ORAL at 08:23

## 2024-11-21 RX ADMIN — GUAIFENESIN 600 MG: 600 TABLET, EXTENDED RELEASE ORAL at 08:23

## 2024-11-21 RX ADMIN — LEVETIRACETAM 500 MG: 500 TABLET, FILM COATED ORAL at 08:23

## 2024-11-21 NOTE — NURSING NOTE
Waiting on discharge for Oncology follow up appointment per Dr. Soto.     Office will call back when ready.

## 2024-11-21 NOTE — DISCHARGE SUMMARY
Patient Name: Leon Lemon  : 1945  MRN: [id number]    Date of Admission: 2024  Date of Discharge:  2024  Primary Care Physician: Law Cole MD      Chief Complaint:   Abnormal Imaging and Headache      Discharge Diagnoses     Active Hospital Problems    Diagnosis  POA    **Intracranial hemorrhage [I62.9]  Yes    History of melanoma excision [Z98.890, Z85.820]  Not Applicable      Resolved Hospital Problems   No resolved problems to display.        Hospital Course       This is a 79-year-old male with a past medical history of melanoma of the left neck status post excision on 2022 came to the hospital with a headache.  He underwent CT of the head on  that showed acute intraparenchymal hemorrhage.  Neurosurgery was consulted and he was admitted to the ICU after being initiated on high-dose steroids.  He underwent a CT of the chest abdomen pelvis that showed a 1.3 cm right lower lobe pulmonary nodule, multiple hepatic lesions, and a 1 cm left renal lesion.  He underwent a CT-guided biopsy of liver the following day that was actually negative for malignancy.   An MRI of the brain was also performed that showed multiple scattered supratentorial and infratentorial enhancing brain masses concern for hemorrhage and likely metastatic disease.  He has an appointment with radiation oncology on 24 at 0745.   He will be discharged on PO steroids and keppra.         Physical Exam:  Temp:  [97.3 °F (36.3 °C)-97.9 °F (36.6 °C)] 97.7 °F (36.5 °C)  Heart Rate:  [52-60] 52  Resp:  [16-18] 18  BP: (115-130)/(70-77) 121/77  Body mass index is 23.03 kg/m².  Physical Exam  Constitutional:       General: He is not in acute distress.  HENT:      Head: Normocephalic and atraumatic.   Cardiovascular:      Rate and Rhythm: Normal rate and regular rhythm.   Pulmonary:      Effort: Pulmonary effort is normal. No respiratory distress.   Abdominal:      General: There is no distension.       Palpations: Abdomen is soft.      Tenderness: There is no abdominal tenderness.   Neurological:      Mental Status: He is alert and oriented to person, place, and time.         Consultants     Consult Orders (all) (From admission, onward)       Start     Ordered    11/20/24 1100  Inpatient Internal Medicine Consult  Once        Specialty:  Internal Medicine  Provider:  Franki Lindo MD    11/20/24 1059    11/20/24 0702  Inpatient Radiation Oncology Consult  IN AM        Specialty:  Radiation Oncology  Provider:  Isidro Carrillo MD    11/19/24 2020 11/19/24 1032  Hematology & Oncology Inpatient Consult  Once        Specialty:  Hematology and Oncology  Provider:  Lobito Soto Jr., MD    11/19/24 1032    11/19/24 0814  Neurosurgery (on-call MD unless specified)  Once        Specialty:  Neurosurgery  Provider:  Honorio Evangelista MD    11/19/24 0814    11/18/24 2118  Notify Stroke Coordinator  Once        Provider:  (Not yet assigned)    11/18/24 2121 11/18/24 2118  Inpatient Diabetes Educator Consult  Once,   Status:  Canceled        Provider:  (Not yet assigned)    11/18/24 2121 11/18/24 2118  Inpatient Neurosurgery Consult  Once        Specialty:  Neurosurgery  Provider:  Arnaud Yepez MD    11/18/24 2121 11/18/24 2002  Pulmonology (on-call MD unless specified)  Once        Specialty:  Pulmonary Disease  Provider:  Magda Washburn MD    11/18/24 2001 11/18/24 1929  Neurosurgery (on-call MD unless specified)  Once,   Status:  Canceled        Specialty:  Neurosurgery  Provider:  Arnaud Yepez MD    11/18/24 1928                  Procedures     Imaging Results (All)       Procedure Component Value Units Date/Time    CT Needle Biopsy Liver [[id number]] Collected: 11/19/24 1603    Specimen: Tissue Updated: 11/20/24 1638    Narrative:      CT-GUIDED BIOPSY OF THE LIVER 11/19/2024     HISTORY: Multiple liver lesions.     TECHNIQUE: After signed informed consent was obtained patient  was  prepped and draped in the supine position. Lidocaine was used for local  anesthesia.     18-gauge biopsy device was used to obtain core specimens of a hypodense  lesion posterior to the gallbladder in the right hepatic lobe.  Confirmatory images were obtained.     No sedation was used during the procedure.     Radiation dose reduction techniques were utilized, including automated  exposure control and exposure modulation based on body size.        This report was finalized on 11/20/2024 4:35 PM by Dr. Amado Crystal M.D on Workstation: [id number]       MRI Brain With & Without Contrast [[id number]] Collected: 11/19/24 2245     Updated: 11/19/24 2306    Narrative:      MRI BRAIN WITH AND WITHOUT CONTRAST     HISTORY:    Intracranial hemorrhage, brain mass, history of melanoma     COMPARISON: Multiple head CTs dating back to 11/18/2024     TECHNIQUE: Multiplanar, multisequence MR imaging of the brain was  performed with and without intravenous contrast.       Impression:      FINDINGS AND IMPRESSION:  There are multiple scattered supratentorial and infratentorial enhancing  brain masses, many of which demonstrate associated susceptibility effect  suggestive of hemorrhage, and likely represent metastatic disease. Areas  of vasogenic edema are present. Index peripheral enhancing mass within  the anterior aspect of the left temporal lobe measures 3 x 2.1 x 2.4 cm  and demonstrates internal susceptibility effect with what appears to be  an associated fluid-fluid level suggestive of hemorrhage. Index avidly  enhancing lesion within the right caudate measures 0.9 x 0.9 x 1 cm.     There appears to be left right midline shift measuring up to  approximately 0.5 cm, as before. No hydrocephalus.     Findings suggestive of moderate chronic ischemic small vessel  degenerative areas.        This report was finalized on 11/19/2024 11:03 PM by Dr. Marin Benoit M.D on Workstation: [id number]       CT Head Without Contrast  [[id number]] Collected: 11/19/24 0724     Updated: 11/19/24 0740    Narrative:      CT HEAD WO CONTRAST-     HISTORY:  Stroke, follow up     COMPARISON: CT head 11/18/2024     FINDINGS: An ovoid area of increased attenuation is appreciated  involving the left temporal lobe anteriorly consistent with an area of  hemorrhage. There is layering blood products appreciated. This measures  2.8 cm in the AP dimension, 2.3 cm in the transverse dimension and 2.7  cm in cranial caudal dimension. There is moderate surrounding edema. An  ovoid area of increased attenuation is appreciated measuring  approximately 20 x 16 x 18 mm in size. This is heterogeneous in  appearance with a subtle slightly lower attenuation nodule medially  measuring approximately 1.3 x 1.2 x 0.9 cm in size. A 12 mm ovoid area  of heterogeneous attenuation with layering blood products is appreciated  involving the left parietal lobe superiorly and medially. Mild to  moderate adjacent vasogenic edema is appreciated. An ill-defined area of  increased attenuation is appreciated involving the left parietal lobe  posteriorly measuring approximately 1.8 x 1.4 x 1.4 cm in size with mild  to moderate adjacent vasogenic edema. A mildly ovoid area of increased  attenuation is appreciated above the head of the thalamus on the right  measuring 10 mm in size. A cortical area of increased attenuation is  appreciated involving the right frontal lobe laterally measuring 7 mm in  size with some mild adjacent edema. There is approximately 5 mm of  midline shift to the right, unchanged.       Impression:      There are 5 lesions appreciated supratentorially the largest  of which involves the left temporal lobe anteriorly and is somewhat  cystic with layering blood products or hyperattenuating material. This  measures approximately 2.8 x 2.3 x 2.7 cm in size. A heterogeneous  lesion involving the left frontal lobe is appreciated measuring  approximately 2 cm in size. An ovoid  lesion involving the superior  medial aspect of the left parietal lobe is appreciated with layering  material measuring 12 mm in size. There is a subtle nodular component  medially with blood products adjacent to an nearly encompassing the  nodule anteriorly and medially. Nonhemorrhagic lesions are appreciated  involving the head of the caudate nucleus on the right, the cortex of  the right frontal lobe laterally and left parietal lobe posteriorly. The  appearance is most consistent with multiple metastatic lesions a number  of which are hemorrhagic. Further evaluation with a MRI examination  brain with and without contrast is recommended. There is approximately 5  mm of midline shift to the right, unchanged.                 Radiation dose reduction techniques were utilized, including automated  exposure modulation based on body size.     This report was finalized on 11/19/2024 7:37 AM by Dr. Pedrito Ledesma M.D on Workstation: [id number]       CT Abdomen Pelvis With Contrast [[id number]] Collected: 11/19/24 0720     Updated: 11/19/24 0720    Narrative:        Patient: AIRAM FITZGERALD  Time Out: 07:19  Exam(s): CT ABDOMEN + PELVIS With Contrast     EXAM:    CT Abdomen and Pelvis With Intravenous Contrast    CLINICAL HISTORY:     Reason for exam: evaluate for primary lesion.    TECHNIQUE:    Axial computed tomography images of the abdomen and pelvis with   intravenous contrast.  CTDI is 13.27 mGy and DLP is 1042.7 mGy-cm.  This   CT exam was performed according to the principle of ALARA (As Low As   Reasonably Achievable) by using one or more of the following dose   reduction techniques: automated exposure control, adjustment of the mA   and or kV according to patient size, and or use of iterative   reconstruction technique.    COMPARISON:    No relevant prior studies available.    FINDINGS:    Lung bases:  Unremarkable.  No mass.  No consolidation.     ABDOMEN:    Liver:  Innumerable low attenuation hepatic masses  measure as large as   2.6 x 1.4 cm in the right lobe.    Gallbladder and bile ducts:  Unremarkable.  No calcified stones.  No   ductal dilation.    Pancreas:  Unremarkable.  No mass.  No ductal dilation.    Spleen:  Unremarkable.  No splenomegaly.    Adrenals:  Unremarkable.  No mass.    Kidneys and ureters:  1 cm simple right renal cyst.  1 cm intermediate   density left renal cystic lesion is indeterminate.  No hydronephrosis.    Stomach and bowel:  Mild sigmoid diverticulosis without associated   inflammation.  No obstruction.  No mucosal thickening.     PELVIS:    Appendix:  Appendectomy.    Bladder:  See below.      Reproductive:  Enlarged prostate with mass-effect on the dorsal bladder.     ABDOMEN and PELVIS:    Intraperitoneal space:  Unremarkable.  No free air.  No significant   fluid collection.    Bones joints:  No acute fracture.      Vasculature:  Mild aortoiliac atherosclerosis.  No abdominal aortic   aneurysm.    Lymph nodes:  Unremarkable.  No enlarged lymph nodes.    IMPRESSION:       1.  Innumerable hepatic metastasis.  Correlate with history of primary   carcinoma.  Recommend PET CT and biopsy.  2.  Enlarged prostate.  Correlate with PSA.  3.  Indeterminate 1 cm left renal lesion, most likely a mildly complex   cyst.      Impression:          Electronically signed by Antonino Padron MD on 11-19-24 at 0719    CT Chest With Contrast Diagnostic [[id number]] Collected: 11/19/24 0713     Updated: 11/19/24 0713    Narrative:        Patient: AIRAM FITZGERALD  Time Out: 07:13  Exam(s): CT CHEST With Contrast     EXAM:    CT Chest With Intravenous Contrast    CLINICAL HISTORY:     Reason for exam: evaluate for primary lesion.    TECHNIQUE:    Axial computed tomography images of the chest with intravenous contrast.    CTDI is 13.27 mGy and DLP is 1042.7 mGy-cm.  This CT exam was performed   according to the principle of ALARA (As Low As Reasonably Achievable) by   using one or more of the following dose  reduction techniques: automated   exposure control, adjustment of the mA and or kV according to patient   size, and or use of iterative reconstruction technique.    COMPARISON:    No relevant prior studies available.    FINDINGS:    Lungs:  1.3 cm right lower lobe pulmonary nodule series 3 image 75   subsegmental linear atelectasis in the lower lobes.    Pleural space:  Unremarkable.  No pneumothorax.  No significant   effusion.    Heart:  Severe coronary artery calcification.  No cardiomegaly.  No   significant pericardial effusion.    Mediastinum:  Small calcified mediastinal lymph nodes and a small   calcified granuloma in the right lower lobe consistent with the sequela   of prior granulomatous disease, benign.    Bones joints:  Unremarkable.  No acute fracture.  No dislocation.    Soft tissues:  Unremarkable.    Vasculature: No aortic aneurysm.    Lymph nodes:  See above.    IMPRESSION:       1.3 cm right lower lobe pulmonary nodule.  Differential diagnosis   includes metastasis, and the sequela of granulomatous infection or other   infection inflammation.  Recommend follow-up PET CT and tissue sampling.      Impression:          Electronically signed by Antonino Padron MD on 11-19-24 at 0713    CT Angiogram Head [[id number]] Collected: 11/18/24 2116     Updated: 11/18/24 2117    Narrative:      CT OF THE BRAIN WITH AND WITHOUT CONTRAST AND CT ANGIOGRAPHY OF THE HEAD  AND NECK WITH CONTRAST INCLUDING RECONSTRUCTION IMAGES 11/18/2024     HISTORY: Follow-up intracranial hemorrhage.     TECHNIQUE: Axial images were obtained through the brain without  intravenous contrast.      FINDINGS: Previous CT of the brain from earlier today shows at least 3  areas of hemorrhage in the left temporal lobe, left frontal lobe and  posterior parietal lobe. These appear unchanged from the previous study.  There is surrounding edema around these areas of hemorrhage. Small focus  of slightly high attenuation is seen in the anterior  aspect of the head  of the caudate nucleus on the right as well. This was also present on  the previous study. Following the intravenous contrast injection CT  angiography was performed through the head and neck. Sagittal, coronal  and 3D reconstruction images were reviewed.     NASCET criteria was used.     There is normal configuration of the aortic arch. Bilateral common  carotid arteries appear patent. Mild atherosclerotic disease is seen in  both carotid bifurcations but no significant NASCET criteria stenosis is  seen. Bilateral internal and external carotid arteries appear patent.  Supraclinoid internal carotid artery calcification is seen bilaterally.  Bilateral middle and anterior cerebral arteries appear patent.     Bilateral vertebral arteries and the basilar artery and its branches  appear patent.     No aneurysm, vascular occlusion or thrombus is seen.     Postcontrast CT of the brain shows enhancement of multiple areas in the  brain including areas of hemorrhage seen on the precontrast study as  well as some enhancement in the region of the head of the caudate  nucleus lesion on the right, small right frontal lesion on postcontrast  image 32, peripheral lesion in the right frontal lobe on image 30. These  areas of enhancement again demonstrate surrounding edema.       Impression:      1. No acute process identified on CT angiography of the head and neck  with contrast.  2. Areas of hyperdense hemorrhage are seen in the left cerebral  hemisphere with another focus of high attenuation in the right caudate  head on precontrast images. Multiple additional enhancing lesions are  seen following contrast as discussed above. Surrounding edema is seen.  These findings are consistent with metastatic disease and some of these  appear to have hemorrhaged.     Comment reviewed 3D]  Radiation dose reduction techniques were utilized, including automated  exposure control and exposure modulation based on body size.           CT Angiogram Neck [[id number]] Collected: 11/18/24 2116     Updated: 11/18/24 2117    Narrative:      CT OF THE BRAIN WITH AND WITHOUT CONTRAST AND CT ANGIOGRAPHY OF THE HEAD  AND NECK WITH CONTRAST INCLUDING RECONSTRUCTION IMAGES 11/18/2024     HISTORY: Follow-up intracranial hemorrhage.     TECHNIQUE: Axial images were obtained through the brain without  intravenous contrast.      FINDINGS: Previous CT of the brain from earlier today shows at least 3  areas of hemorrhage in the left temporal lobe, left frontal lobe and  posterior parietal lobe. These appear unchanged from the previous study.  There is surrounding edema around these areas of hemorrhage. Small focus  of slightly high attenuation is seen in the anterior aspect of the head  of the caudate nucleus on the right as well. This was also present on  the previous study. Following the intravenous contrast injection CT  angiography was performed through the head and neck. Sagittal, coronal  and 3D reconstruction images were reviewed.     NASCET criteria was used.     There is normal configuration of the aortic arch. Bilateral common  carotid arteries appear patent. Mild atherosclerotic disease is seen in  both carotid bifurcations but no significant NASCET criteria stenosis is  seen. Bilateral internal and external carotid arteries appear patent.  Supraclinoid internal carotid artery calcification is seen bilaterally.  Bilateral middle and anterior cerebral arteries appear patent.     Bilateral vertebral arteries and the basilar artery and its branches  appear patent.     No aneurysm, vascular occlusion or thrombus is seen.     Postcontrast CT of the brain shows enhancement of multiple areas in the  brain including areas of hemorrhage seen on the precontrast study as  well as some enhancement in the region of the head of the caudate  nucleus lesion on the right, small right frontal lesion on postcontrast  image 32, peripheral lesion in the right frontal  lobe on image 30. These  areas of enhancement again demonstrate surrounding edema.       Impression:      1. No acute process identified on CT angiography of the head and neck  with contrast.  2. Areas of hyperdense hemorrhage are seen in the left cerebral  hemisphere with another focus of high attenuation in the right caudate  head on precontrast images. Multiple additional enhancing lesions are  seen following contrast as discussed above. Surrounding edema is seen.  These findings are consistent with metastatic disease and some of these  appear to have hemorrhaged.     Comment reviewed 3D]  Radiation dose reduction techniques were utilized, including automated  exposure control and exposure modulation based on body size.                  Pertinent Labs     Results from last 7 days   Lab Units 11/21/24  0641 11/20/24 0457 11/19/24 0321 11/18/24  1845   WBC 10*3/mm3 17.56* 13.97* 6.69 7.04   HEMOGLOBIN g/dL 14.2 14.1 14.0 14.0   PLATELETS 10*3/mm3 190 191 163 179     Results from last 7 days   Lab Units 11/21/24 0641 11/20/24 0457 11/19/24 0321 11/18/24  1845   SODIUM mmol/L 137 138 137 136   POTASSIUM mmol/L 4.4 4.2 4.3 4.7   CHLORIDE mmol/L 104 105 102 102   CO2 mmol/L 20.4* 21.0* 22.1 22.4   BUN mg/dL 42* 31* 17 18   CREATININE mg/dL 1.41* 1.38* 1.23 1.37*   GLUCOSE mg/dL 123* 126* 91 92   Estimated Creatinine Clearance: 40.7 mL/min (A) (by C-G formula based on SCr of 1.41 mg/dL (H)).  Results from last 7 days   Lab Units 11/21/24 0641 11/18/24  1845   ALBUMIN g/dL 3.9 3.9   BILIRUBIN mg/dL 1.5* 2.2*   ALK PHOS U/L 150* 174*   AST (SGOT) U/L 24 22   ALT (SGPT) U/L 22 17     Results from last 7 days   Lab Units 11/21/24  0641 11/20/24 0457 11/19/24 0321 11/18/24  1845   CALCIUM mg/dL 8.7 9.2 9.1 9.4   ALBUMIN g/dL 3.9  --   --  3.9           Results from last 7 days   Lab Units 11/19/24  0321   CHOLESTEROL mg/dL 144   TRIGLYCERIDES mg/dL 102   HDL CHOL mg/dL 51   LDL CHOL mg/dL 74           Test Results  Pending at Discharge       Discharge Details        Discharge Medications        New Medications        Instructions Start Date   dexAMETHasone 4 MG tablet  Commonly known as: DECADRON   4 mg, Oral, Every 6 Hours Scheduled      famotidine 20 MG tablet  Commonly known as: PEPCID   20 mg, Oral, Daily   Start Date: November 22, 2024     levETIRAcetam 500 MG tablet  Commonly known as: KEPPRA   500 mg, Oral, 2 Times Daily             Continue These Medications        Instructions Start Date   atorvastatin 10 MG tablet  Commonly known as: LIPITOR   atorvastatin 10 mg oral tablet take 1 tablet (10 mg) by oral route once daily at bedtime   Active      carvedilol 25 MG tablet  Commonly known as: COREG   12.5 mg, Oral, 2 Times Daily      doxazosin 2 MG tablet  Commonly known as: CARDURA   2 mg, Oral, Daily      levothyroxine 50 MCG tablet  Commonly known as: SYNTHROID, LEVOTHROID   50 mcg, Daily               No Known Allergies      Discharge Disposition:  Home or Self Care    Discharge Diet:  Diet Order   Procedures    Diet: Regular/House; Fluid Consistency: Thin (IDDSI 0)       Discharge Activity:       CODE STATUS:    Code Status and Medical Interventions: CPR (Attempt to Resuscitate); Full Support   Ordered at: 11/18/24 2121     Code Status (Patient has no pulse and is not breathing):    CPR (Attempt to Resuscitate)     Medical Interventions (Patient has pulse or is breathing):    Full Support       Future Appointments   Date Time Provider Department Center   11/22/2024  7:45 AM Galo Rodriguez MD [facility]   2/14/2025  9:30 AM Tadeo Rodriguez MD [facility]      Follow-up Information       Law Cole MD .    Specialty: Family Medicine  Contact information:  [address] [place] [address] [address] [place] [address] [phone]                             Time Spent on Discharge:  Greater than 30 minutes      Dann Mart MD  [facility]  11/21/24  12:27 EST

## 2024-11-21 NOTE — PLAN OF CARE
Problem: Adult Inpatient Plan of Care  Goal: Absence of Hospital-Acquired Illness or Injury  Intervention: Prevent Skin Injury  Recent Flowsheet Documentation  Taken 11/20/2024 1540 by Paul Amador, RN  Body Position: position changed independently  Skin Protection: transparent dressing maintained     Problem: Adult Inpatient Plan of Care  Goal: Absence of Hospital-Acquired Illness or Injury  Intervention: Prevent and Manage VTE (Venous Thromboembolism) Risk  Recent Flowsheet Documentation  Taken 11/20/2024 1540 by Paul Amador, RN  VTE Prevention/Management:   bilateral   SCDs (sequential compression devices) off   patient refused intervention   Goal Outcome Evaluation:  Plan of Care Reviewed With: patient, spouse                                         
Goal Outcome Evaluation:           Progress: improving  Outcome Evaluation: Pt is a 78 y/o M admitted to [facility] 11/18 with headaches for multiple days and no other signs/symptoms. Pt w/ acute intraparenchymal hemorrhage is noted in the left frontal, left temporal and left frontal parietal lobe. Findings may be due to hemorrhagic metasteses w/ plan for liver biopsy today vs tomorrow per RN. Pt demonstrated w/ no new focal deficits and is at baseline for all ADLs/mobility, does have some baseline higher level balance deficits but no overt LOB noted or needs for acute Ot services. Please consult in the future if function changes/declines. Rec home w/ spouse at this time when medically stable.    Anticipated Discharge Disposition (OT): home                        
Goal Outcome Evaluation:  Plan of Care Reviewed With: patient, spouse        Progress: improving  Outcome Evaluation: Pt seen for PT this afternoon. Pt is a 78 y/o M admitted to [facility] 11/18 with headaches for multiple days and no other signs/symptoms. Pt w/ acute intraparenchymal hemorrhage is noted in the left frontal, left temporal and left frontal parietal lobe. Findings may be due to hemorrhagic metasteses w/ plan for liver biopsy today vs tomorrow per RN. Pt mobilizing well at this time and appears at his baseline. Pt able to ambulate approx 200 ft w supervision. No overt unsteadiness noted. Pt noted to have some higher level balance deficits, but he reports this is baseline and this did not result in any overt unsteadiness or LOB. Pt returned to room at end of session. No further acute PT needs at this time. Please re-consult if function changes or any issues arise.    Anticipated Discharge Disposition (PT): home                        
The patient is a 43y Female complaining of shortness of breath.

## 2024-11-21 NOTE — PROGRESS NOTES
[facility] INPATIENT PROGRESS NOTE    Length of Stay:  3 days    CHIEF COMPLAINT:  Metastatic malignancy with hemorrhagic brain metastases and diffuse liver metastases, history of melanoma    SUBJECTIVE:   Patient with no complaints today, anxious to go home, currently ready for discharge.  He denies any headaches.  He does note some mild fatigue and anorexia.    ROS:  Review of Systems   Comprehensive review of systems was obtained with pertinent positive findings as noted in the interval history above.  All other systems negative.    OBJECTIVE:  Vitals:    11/21/24 0016 11/21/24 0413 11/21/24 0715 11/21/24 1137   BP: 117/70 120/71 115/71 121/77   BP Location: Left arm Right arm Left arm Left arm   Patient Position: Lying Lying Lying Lying   Pulse: 53 55 52    Resp: 18 18 18 18   Temp: 97.3 °F (36.3 °C) 97.5 °F (36.4 °C) 97.5 °F (36.4 °C) 97.7 °F (36.5 °C)   TempSrc: Oral Oral Oral Oral   SpO2: 94% 93% 94%    Weight:       Height:             PHYSICAL EXAMINATION:  General: Alert and orient x 3 no distress  Chest/Lungs: Clear to auscultation bilaterally  Heart: Regular rate and rhythm  Abdomen/GI: Soft nontender nondistended bowel sounds present  Extremities: No edema    Patient was examined today, unchanged from above    DIAGNOSTIC DATA:  Results Review:     I reviewed the patient's new clinical results.    Results from last 7 days   Lab Units 11/21/24  0641 11/20/24  0457 11/19/24  0321   WBC 10*3/mm3 17.56* 13.97* 6.69   HEMOGLOBIN g/dL 14.2 14.1 14.0   HEMATOCRIT % 40.3 40.7 41.3   PLATELETS 10*3/mm3 190 191 163      Results from last 7 days   Lab Units 11/21/24  0641 11/20/24  0457 11/19/24  0321 11/18/24  1845   SODIUM mmol/L 137 138 137 136   POTASSIUM mmol/L 4.4 4.2 4.3 4.7   CHLORIDE mmol/L 104 105 102 102   CO2 mmol/L 20.4* 21.0* 22.1 22.4   BUN mg/dL 42* 31* 17 18   CREATININE mg/dL 1.41* 1.38* 1.23 1.37*   CALCIUM mg/dL 8.7 9.2 9.1 9.4   BILIRUBIN mg/dL 1.5*  --   --  2.2*   ALK PHOS U/L  150*  --   --  174*   ALT (SGPT) U/L 22  --   --  17   AST (SGOT) U/L 24  --   --  22   GLUCOSE mg/dL 123* 126* 91 92      Lab Results   Component Value Date    NEUTROABS 16.33 (H) 11/21/2024     Results from last 7 days   Lab Units 11/18/24  1845   INR  1.11*   APTT seconds 46.7*             Assessment & Plan   ASSESSMENT/PLAN:  This is a 79 y.o. male with:     *History of left neck stage IB ([id number]) melanoma  Patient has history of melanoma left neck.    Surgical excision on 7/30/2020 with pathology that showed superficial spreading melanoma, Breslow thickness 1.1 mm, no ulceration, negative margins, mitotic rate 1 mitosis per square millimeter, no lymphovascular invasion.  [place] lymph node biopsy with 1 lymph node negative.  Stage IB ([id number]).     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    Initial CT head on 11/18/2024 with acute intraparenchymal hemorrhage left frontal, left temporal, left frontal parietal lobes and acute hemorrhage in the right lateral ventricle.    CT angiogram head and neck 11/18/2024 showed hyperdense hemorrhage in the left cerebral hemisphere, right caudate head, additional enhancing lesions in multiple areas in the brain, some of which included areas of hemorrhage.    Repeat CT head on 11/19/2024 showed a 2.8 x 2.3 x 2.7 cm area of hemorrhage in the left temporal lobe with moderate surrounding edema.  There was left frontal 2.0 x 1.6 x 1.8 cm ovoid increased attenuation lesion with lower attenuation nodule 1.3 x 1.2 x 0.9 cm.  There was a 1.2 cm ovoid area of heterogeneous attenuation with layering blood products in the left parietal lobe with mild to moderate adjacent edema.  Ill-defined increased attenuation left parietal lobe 1.4 x 1.4 x 1.4 cm with mild to moderate associated vasogenic edema.  Mildly ovoid area of increased attenuation in  the head of the thalamus on the right 1 cm.  Cortical area of increased attenuation right frontal lobe 7 mm with adjacent edema.  5 mm midline shift to the right unchanged.  Patient initiated high-dose steroids with dexamethasone 4 mg IV every 6 hours  CT chest abdomen and pelvis on 11/19/2024 showed 1.3 cm right lower lobe pulmonary nodule, innumerable hepatic lesions up to 2.6 x 1.4 cm on the right.  Enlarged prostate.  Indeterminant 1 cm left renal lesion most likely complex cyst.  On 11/19/2024 patient underwent CT-guided biopsy of liver lesion with pathology that showed no evidence of malignancy, liver parenchyma with mononuclear inflammatory infiltrate with single focus of fibrosis and mixed chronic inflammatory cells composed of mature lymphocytes and plasma cells.  No evidence of steatosis.  No evidence of fibrosis.  Inflammatory infiltrate may be related to mass effect.  MRI brain on 10/19/2024 with multiple supratentorial and infratentorial enhancing brain lesions many of which with associated hemorrhage and vasogenic edema.  Index lesion left temporal lobe 3 x 2.1 x 2.4 cm, index lesion right caudate 0.9 x 0.9 x 1 cm.  Left to right midline shift up to 0.5 cm, unchanged.  LDH on 11/19/2024 was 288  Patient with newly identified metastatic malignancy, presented with headache from multiple hemorrhagic brain metastases and subsequently found to have diffuse hepatic metastases as well. Given his history of melanoma, high suspicion for metastatic melanoma, particularly in light of the hemorrhagic brain lesions.    Patient continues on high-dose dexamethasone 4 mg every 6 hours for cerebral edema  Radiation oncology consulted in regards to palliative radiation for brain metastases. Patient does note that he lives in [facility] and [facility] would be closer for him to receive care for both radiation and medical oncology in the future.  Patient is being scheduled for radiation oncology visit  at [facility] with Dr. Rodriguez on 11/22/2024.  We will schedule follow-up visit with medical oncology at [facility] early next week.  Patient is currently preparing for discharge home.  We reviewed his pathology from liver biopsy 11/19/2024 that unfortunately does not show any evidence of malignancy.  This is felt to be a nondiagnostic specimen.  I did contact  interventional radiology who felt that certainly another liver biopsy could be performed.  Unfortunately this cannot be performed while inpatient before discharge today.  Patient does not wish to stay in the hospital for the biopsy.  Therefore we will make arrangements for immediate outpatient biopsy as soon as possible either on Friday 11/22 or Monday 11/25 here in [place].  We will make arrangements for medical oncology appointment with [facility] group as soon as possible as well.     *Seizure prophylaxis  Keppra 500 mg twice daily     *GI prophylaxis  Pepcid 20 mg IV daily     *CKD3a  Creatinine appears to be in the 1.2-1.4 range    *Leukocytosis  Secondary to steroids       PLAN:   Patient with metastatic malignancy with hemorrhagic brain metastases and diffuse liver metastases, high suspicion for metastatic melanoma  Pathology from CT-guided liver biopsy 11/19/2024 negative for malignancy, felt to be nondiagnostic  Continue dexamethasone 4 mg every 6 hours, will change from IV to p.o.  Patient continuing on seizure prophylaxis with Keppra 500 mg twice daily  Patient is being discharged home today  We will schedule immediate outpatient repeat CT-guided liver biopsy either on 11/22 or 11/25/2024 here in [place] to be sent for pathologic review  Patient lives in [facility], outpatient treatment will be more convenient at [facility] for both radiation and medical oncology after discharge.  Patient scheduled to see Dr. Rodriguez in radiation oncology on 11/22/2024.  We will make arrangements for patient  to be seen by medical oncology at [facility] early next week.    Discussed with patient and wife at bedside    Addendum: We are only able to schedule outpatient CT-guided liver biopsy on 11/27/2024, no available times before that date as outpatient.  Patient is on cancellation list.  Appointment with medical oncology at [facility] is pending, referral made.         Lobito Soto MD

## 2024-11-22 ENCOUNTER — CONSULT (OUTPATIENT)
Dept: RADIATION ONCOLOGY | Facility: HOSPITAL | Age: 79
End: 2024-11-22
Payer: MEDICARE

## 2024-11-22 VITALS
BODY MASS INDEX: 22.13 KG/M2 | HEART RATE: 48 BPM | DIASTOLIC BLOOD PRESSURE: 78 MMHG | WEIGHT: 141.31 LBS | TEMPERATURE: 97.5 F | RESPIRATION RATE: 18 BRPM | SYSTOLIC BLOOD PRESSURE: 131 MMHG | OXYGEN SATURATION: 97 %

## 2024-11-22 DIAGNOSIS — C79.31 SECONDARY MALIGNANT NEOPLASM OF BRAIN: Primary | ICD-10-CM

## 2024-11-22 DIAGNOSIS — I62.9 INTRACRANIAL HEMORRHAGE: ICD-10-CM

## 2024-11-22 DIAGNOSIS — K76.9 LIVER LESION: Primary | ICD-10-CM

## 2024-11-22 PROBLEM — G93.6 CEREBRAL EDEMA: Status: ACTIVE | Noted: 2024-11-22

## 2024-11-22 PROCEDURE — G0463 HOSPITAL OUTPT CLINIC VISIT: HCPCS | Performed by: RADIOLOGY

## 2024-11-22 NOTE — PROGRESS NOTES
"Enter Query Response Below      Query Response: I updated problem list             If applicable, please update the problem list.   Patient: Leon Lemon        : 1945  Account: [id number]           Admit Date:         How to Respond to this query:       a. Click New Note     b. Answer query within the yellow box.                c. Update the Problem List, if applicable.      If you have any questions about this query contact me at:  [email or web address]     Dr. Soto,     79-year male patient with PMHx  that includes melanoma of left neck admitted 25 with hemorrhagic brain and diffuse liver metastases, high suspicion for metastatic melanoma.   Vasogenic edema noted on 24 Head CT along with,   \" approximately 5 mm of midline shift to the right, unchanged.\"  Treatment included IV Dexamethasone 4mg every 6 hours, transitioned to by mouth with order to continue at discharge. Discharge plans include palliative brain radiation.     Please clarify if the patient being treated/monitored for one or more of the following:  > Brain compression as evidenced by midline shift  > Other- specify ___________    By submitting this query, we are merely seeking further clarification of documentation to accurately reflect all conditions that you are monitoring, evaluating, treating or that extend the hospitalization or utilize additional resources of care. Please utilize your independent clinical judgment when addressing the question(s) above.     This query and your response, once completed, will be entered into the legal medical record.    Sincerely,  Nely Broussard RN, CCDS  Clinical Documentation Integrity Program   [email or web address]   "

## 2024-11-22 NOTE — PROGRESS NOTES
New Patient Office Visit      Encounter Date: 11/22/2024   Patient Name: Leon Lemon  YOB: 1945   Medical Record Number: [id number]   Primary Diagnosis: Secondary malignant neoplasm of brain [C79.31]         Chief Complaint:    Chief Complaint   Patient presents with    Consult       History of Present Illness: Leon Lemon is a 79-year-old gentleman with multiple contrast-enhancing brain lesions consistent with brain metastases who is seen in consultation regarding radiotherapy.  Mr. Lemon began experiencing headaches approximately 1 month ago.  The headaches are described as global and have increased in intensity over the past month.  He was additionally noticed by his wife to exhibit some word finding difficulty in recent weeks.  He was evaluated by his primary care provider and underwent CT scan of the head without contrast on 11/18/2024 revealing acute intraparenchymal hemorrhage within the left frontal, left temporal and left frontoparietal lobes.  He was advised to seek care immediately at [facility] where he underwent MRI of the brain with and without contrast revealing multiple scattered supratentorial and infratentorial brain masses consistent with metastatic disease.  The largest lesion measured 3 x 2.1 x 2.4 cm within the left temporal lobe.  There was an enhancing lesion within the right caudate measuring 0.9 x 0.9 x 1 cm.  There was apparent left-to-right midline shift measuring approximately 0.5 cm.  CT scan of the chest, abdomen and pelvis performed on 11/18/2024 revealed innumerable hepatic metastases and an indeterminate 1 cm left renal lesion most likely a cyst.  There was an approximate 1.3 cm right lower lobe pulmonary nodule.  Mr. Lmeon underwent biopsy of the the liver on 11/19/2024 revealing liver parenchyma with inflammatory infiltrate.  He was started on dexamethasone 4 mg p.o. 4 times daily as well as Keppra 500 mg p.o. twice daily.  He did note  some improvement in word finding difficulty as well as headaches after starting steroids.    Subjective      Review of Systems: Review of Systems   Constitutional:  Positive for appetite change, fatigue and unexpected weight change (5lb wt loss while in hospital).   HENT:  Positive for hearing loss and voice change (raspy). Negative for sore throat, tinnitus and trouble swallowing.    Eyes:  Negative for visual disturbance.   Respiratory:  Negative for cough and shortness of breath.    Cardiovascular:  Negative for chest pain, palpitations and leg swelling.   Gastrointestinal:  Positive for constipation (occasional). Negative for anal bleeding, blood in stool, diarrhea, nausea and rectal pain.   Genitourinary:  Negative for difficulty urinating, dysuria, frequency and urgency.   Musculoskeletal:  Positive for arthralgias. Negative for back pain.   Skin:  Negative for rash.   Neurological:  Positive for speech difficulty (occasional trying to find words) and weakness (generalized). Negative for dizziness and headaches.   Psychiatric/Behavioral:  Positive for confusion. Negative for agitation, decreased concentration, self-injury, sleep disturbance and suicidal ideas. The patient is not nervous/anxious.        Past Medical History:   Past Medical History:   Diagnosis Date    Arthritis     Bradycardia     Cancer     Melanoma - surgery    High blood pressure     High cholesterol        Past Surgical History:   Past Surgical History:   Procedure Laterality Date    SKIN CANCER EXCISION      Melanoma excision       Family History:   Family History   Problem Relation Age of Onset    Cancer Brother     No Known Problems Mother     No Known Problems Father        Social History:   Social History     Socioeconomic History    Marital status:    Tobacco Use    Smoking status: Never    Smokeless tobacco: Former     Types: Chew   Vaping Use    Vaping status: Never Used   Substance and Sexual Activity    Alcohol use: Not  Currently     Comment: rarely    Drug use: Never    Sexual activity: Not Currently     Partners: Female       Medications:     Current Outpatient Medications:     atorvastatin (LIPITOR) 10 MG tablet, atorvastatin 10 mg oral tablet take 1 tablet (10 mg) by oral route once daily at bedtime   Active, Disp: , Rfl:     carvedilol (COREG) 25 MG tablet, Take 0.5 tablets by mouth 2 (Two) Times a Day., Disp: 90 tablet, Rfl: 3    dexAMETHasone (DECADRON) 4 MG tablet, Take 1 tablet by mouth Every 6 (Six) Hours for 14 days., Disp: 56 tablet, Rfl: 0    doxazosin (CARDURA) 2 MG tablet, Take 1 tablet by mouth Daily., Disp: , Rfl:     famotidine (PEPCID) 20 MG tablet, Take 1 tablet by mouth Daily., Disp: 30 tablet, Rfl: 0    levETIRAcetam (KEPPRA) 500 MG tablet, Take 1 tablet by mouth 2 (Two) Times a Day., Disp: 60 tablet, Rfl: 0    levothyroxine (SYNTHROID, LEVOTHROID) 50 MCG tablet, Take 1 tablet by mouth Daily., Disp: , Rfl:   No current facility-administered medications for this visit.    Allergies:   No Known Allergies    Pain: (on a scale of 0-10)   Pain Score    11/22/24 0751   PainSc: 0-No pain       Leon Lemon reports a pain score of 0.  Given his pain assessment as noted, treatment options were discussed and the following options were decided upon as a follow-up plan to address the patient's pain: continuation of current treatment plan for pain.     Advanced Care Plan: N   Quality of Life: 90 - Limited Activity    Objective     Physical Exam:   Vital Signs:   Vitals:    11/22/24 0751   BP: 131/78   Pulse: (!) 48   Resp: 18   Temp: 97.5 °F (36.4 °C)   TempSrc: Oral   SpO2: 97%   Weight: 64.1 kg (141 lb 5 oz)   PainSc: 0-No pain     Body mass index is 22.13 kg/m².     Physical Exam  Constitutional:       General: He is not in acute distress.     Appearance: He is normal weight. He is not toxic-appearing.   HENT:      Head: Normocephalic and atraumatic.   Pulmonary:      Effort: Pulmonary effort is normal. No  respiratory distress.   Skin:     General: Skin is warm.      Coloration: Skin is not jaundiced.      Findings: No lesion.   Neurological:      Mental Status: He is alert and oriented to person, place, and time. Mental status is at baseline.      Cranial Nerves: No cranial nerve deficit or facial asymmetry.      Motor: No weakness, tremor, atrophy, abnormal muscle tone, seizure activity or pronator drift.      Coordination: Finger-Nose-Finger Test normal.   Psychiatric:         Mood and Affect: Mood normal.         Behavior: Behavior normal.         Judgment: Judgment normal.         Results:   Radiographs: MRI Brain With & Without Contrast    Result Date: 11/19/2024  FINDINGS AND IMPRESSION: There are multiple scattered supratentorial and infratentorial enhancing brain masses, many of which demonstrate associated susceptibility effect suggestive of hemorrhage, and likely represent metastatic disease. Areas of vasogenic edema are present. Index peripheral enhancing mass within the anterior aspect of the left temporal lobe measures 3 x 2.1 x 2.4 cm and demonstrates internal susceptibility effect with what appears to be an associated fluid-fluid level suggestive of hemorrhage. Index avidly enhancing lesion within the right caudate measures 0.9 x 0.9 x 1 cm.  There appears to be left right midline shift measuring up to approximately 0.5 cm, as before. No hydrocephalus.  Findings suggestive of moderate chronic ischemic small vessel degenerative areas.   This report was finalized on 11/19/2024 11:03 PM by Dr. Marin Benoit M.D on Workstation: [id number]      CT Head Without Contrast    Result Date: 11/19/2024  There are 5 lesions appreciated supratentorially the largest of which involves the left temporal lobe anteriorly and is somewhat cystic with layering blood products or hyperattenuating material. This measures approximately 2.8 x 2.3 x 2.7 cm in size. A heterogeneous lesion involving the left frontal lobe is  appreciated measuring approximately 2 cm in size. An ovoid lesion involving the superior medial aspect of the left parietal lobe is appreciated with layering material measuring 12 mm in size. There is a subtle nodular component medially with blood products adjacent to an nearly encompassing the nodule anteriorly and medially. Nonhemorrhagic lesions are appreciated involving the head of the caudate nucleus on the right, the cortex of the right frontal lobe laterally and left parietal lobe posteriorly. The appearance is most consistent with multiple metastatic lesions a number of which are hemorrhagic. Further evaluation with a MRI examination brain with and without contrast is recommended. There is approximately 5 mm of midline shift to the right, unchanged.      Radiation dose reduction techniques were utilized, including automated exposure modulation based on body size.  This report was finalized on 11/19/2024 7:37 AM by Dr. Pedrito Ledesma M.D on Workstation: [id number]      CT Abdomen Pelvis With Contrast    Result Date: 11/19/2024  Electronically signed by Antonino Padron MD on 11-19-24 at 0719    CT Chest With Contrast Diagnostic    Result Date: 11/19/2024  Electronically signed by Antonino Padron MD on 11-19-24 at 0713    CT Angiogram Head    Result Date: 11/18/2024  1. No acute process identified on CT angiography of the head and neck with contrast. 2. Areas of hyperdense hemorrhage are seen in the left cerebral hemisphere with another focus of high attenuation in the right caudate head on precontrast images. Multiple additional enhancing lesions are seen following contrast as discussed above. Surrounding edema is seen. These findings are consistent with metastatic disease and some of these appear to have hemorrhaged.  Comment reviewed 3D] Radiation dose reduction techniques were utilized, including automated exposure control and exposure modulation based on body size.       CT Angiogram Neck    Result  Date: 11/18/2024  1. No acute process identified on CT angiography of the head and neck with contrast. 2. Areas of hyperdense hemorrhage are seen in the left cerebral hemisphere with another focus of high attenuation in the right caudate head on precontrast images. Multiple additional enhancing lesions are seen following contrast as discussed above. Surrounding edema is seen. These findings are consistent with metastatic disease and some of these appear to have hemorrhaged.  Comment reviewed 3D] Radiation dose reduction techniques were utilized, including automated exposure control and exposure modulation based on body size.       CT Head Without Contrast    Result Date: 11/18/2024  Impression: 1.Acute intraparenchymal hemorrhage is noted in the left frontal, left temporal and left frontal parietal lobes. Findings may be secondary to hemorrhagic conversion of infarctions, amyloid angiopathy, hypertensive hemorrhages, hemorrhagic metastases, previous trauma and bleeding diathesis. 2.Acute hemorrhage in the right lateral ventricle. 3.MRI is suggested to further evaluate. I called results to Dr. Law Cole at the time of dictation. Electronically Signed: Toby Rivas MD  11/18/2024 3:57 PM EST  Workstation ID: [id number]   I personally reviewed the CT scan of the head without contrast as well as the MRI of the brain with and without contrast on 11/18/2024 and 11/19/2024, respectively.    Pathology: I personally reviewed the pathology from biopsy performed on 11/19/2024.    Labs:   WBC   Date Value Ref Range Status   11/21/2024 17.56 (H) 3.40 - 10.80 10*3/mm3 Final     Hemoglobin   Date Value Ref Range Status   11/21/2024 14.2 13.0 - 17.7 g/dL Final     Hematocrit   Date Value Ref Range Status   11/21/2024 40.3 37.5 - 51.0 % Final     Platelets   Date Value Ref Range Status   11/21/2024 190 140 - 450 10*3/mm3 Final       Assessment / Plan      Assessment/Plan:   Leon Lemon is a 79-year-old right handed gentleman with  multiple intracranial contrast-enhancing lesions consistent with metastatic disease.  He additionally has multiple hepatic lesions consistent with hepatic metastases.  He does have a history of melanoma status post wide local excision in 2020.  He has improvement of presenting headaches and word finding difficulty with steroids.  ECOG 1    I discussed the clinical, radiographic and pathologic findings to date with Mr. Lemon and his wife.  I explained the role of radiotherapy in the management of intracranial metastatic disease, outlining the rationale for the treatment of brain metastases with radiation.  I explained that I expect his intracranial metastatic disease to represent metastatic melanoma given his clinical history and the radiographic appearance of these lesions.  In cases of melanoma with brain metastases, systemic therapy can result in marked response while avoiding the delivery of radiotherapy, whole brain radiotherapy or stereotactic radiosurgery.  I have rescheduled his liver biopsy for 11/25/2024 to be performed at [facility].  He will be evaluated by Dr. Martinez the next day.  I advised Mr. Bush to decrease his dexamethasone frequency to twice daily.  I additionally recommended discontinuation of Keppra in keeping with the [employer] Clinical Practice Guidelines as Mr. Lemon reports never experiencing a seizure.  It is my hope that he can avoid radiotherapy to the brain if systemic therapy is initiated promptly after biopsy results are available.  He will follow-up with me in 1 month with repeat MRI of the brain at that time.      Galo Rodriguez MD  Radiation Oncology  [facility]    This document has been signed by Galo Rodriguez MD on November 22, 2024 09:00 EST

## 2024-11-22 NOTE — LETTER
November 22, 2024     Mercy Milligan MD  [address]  [place] [id number]    Patient: Leon Lemon   YOB: 1945   Date of Visit: 11/22/2024     Dear Mercy Milligan MD:       Thank you for referring Leon Lemon to me for evaluation. Below are the relevant portions of my assessment and plan of care.    If you have questions, please do not hesitate to call me.         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  Mr. Lemon began experiencing headaches approximately 1 month ago.  The headaches are described as global and have increased in intensity over the past month.  He was additionally noticed by his wife to exhibit some word finding difficulty in recent weeks.  He was evaluated by his primary care provider and underwent CT scan of the head without contrast on 11/18/2024 revealing acute intraparenchymal hemorrhage within the left frontal, left temporal and left frontoparietal lobes.  He was advised to seek care immediately at [facility] where he underwent MRI of the brain with and without contrast revealing multiple scattered supratentorial and infratentorial brain masses consistent with metastatic disease.  The largest lesion measured 3 x 2.1 x 2.4 cm within the left temporal lobe.  There was an enhancing lesion within the right caudate measuring 0.9 x 0.9 x 1 cm.   There was apparent left-to-right midline shift measuring approximately 0.5 cm.  CT scan of the chest, abdomen and pelvis performed on 11/18/2024 revealed innumerable hepatic metastases and an indeterminate 1 cm left renal lesion most likely a cyst.  There was an approximate 1.3 cm right lower lobe pulmonary nodule.  Mr. Lemon underwent biopsy of the the liver on 11/19/2024 revealing liver parenchyma with inflammatory infiltrate.  He was started on dexamethasone 4 mg p.o. 4 times daily as well as Keppra 500 mg p.o. twice daily.  He did note some improvement in word finding difficulty as well as headaches after starting steroids.    Subjective      Review of Systems: Review of Systems   Constitutional:  Positive for appetite change, fatigue and unexpected weight change (5lb wt loss while in hospital).   HENT:  Positive for hearing loss and voice change (raspy). Negative for sore throat, tinnitus and trouble swallowing.    Eyes:  Negative for visual disturbance.   Respiratory:  Negative for cough and shortness of breath.    Cardiovascular:  Negative for chest pain, palpitations and leg swelling.   Gastrointestinal:  Positive for constipation (occasional). Negative for anal bleeding, blood in stool, diarrhea, nausea and rectal pain.   Genitourinary:  Negative for difficulty urinating, dysuria, frequency and urgency.   Musculoskeletal:  Positive for arthralgias. Negative for back pain.   Skin:  Negative for rash.   Neurological:  Positive for speech difficulty (occasional trying to find words) and weakness (generalized). Negative for dizziness and headaches.   Psychiatric/Behavioral:  Positive for confusion. Negative for agitation, decreased concentration, self-injury, sleep disturbance and suicidal ideas. The patient is not nervous/anxious.        Past Medical History:   Past Medical History:   Diagnosis Date   • Arthritis    • Bradycardia    • Cancer     Melanoma - surgery   • High blood pressure    • High  cholesterol        Past Surgical History:   Past Surgical History:   Procedure Laterality Date   • SKIN CANCER EXCISION      Melanoma excision       Family History:   Family History   Problem Relation Age of Onset   • Cancer Brother    • No Known Problems Mother    • No Known Problems Father        Social History:   Social History     Socioeconomic History   • Marital status:    Tobacco Use   • Smoking status: Never   • Smokeless tobacco: Former     Types: Chew   Vaping Use   • Vaping status: Never Used   Substance and Sexual Activity   • Alcohol use: Not Currently     Comment: rarely   • Drug use: Never   • Sexual activity: Not Currently     Partners: Female       Medications:     Current Outpatient Medications:   •  atorvastatin (LIPITOR) 10 MG tablet, atorvastatin 10 mg oral tablet take 1 tablet (10 mg) by oral route once daily at bedtime   Active, Disp: , Rfl:   •  carvedilol (COREG) 25 MG tablet, Take 0.5 tablets by mouth 2 (Two) Times a Day., Disp: 90 tablet, Rfl: 3  •  dexAMETHasone (DECADRON) 4 MG tablet, Take 1 tablet by mouth Every 6 (Six) Hours for 14 days., Disp: 56 tablet, Rfl: 0  •  doxazosin (CARDURA) 2 MG tablet, Take 1 tablet by mouth Daily., Disp: , Rfl:   •  famotidine (PEPCID) 20 MG tablet, Take 1 tablet by mouth Daily., Disp: 30 tablet, Rfl: 0  •  levETIRAcetam (KEPPRA) 500 MG tablet, Take 1 tablet by mouth 2 (Two) Times a Day., Disp: 60 tablet, Rfl: 0  •  levothyroxine (SYNTHROID, LEVOTHROID) 50 MCG tablet, Take 1 tablet by mouth Daily., Disp: , Rfl:   No current facility-administered medications for this visit.    Allergies:   No Known Allergies    Pain: (on a scale of 0-10)   Pain Score    11/22/24 0751   PainSc: 0-No pain       Leon Lemon reports a pain score of 0.  Given his pain assessment as noted, treatment options were discussed and the following options were decided upon as a follow-up plan to address the patient's pain: continuation of current treatment plan for pain.      Advanced Care Plan: N   Quality of Life: 90 - Limited Activity    Objective     Physical Exam:   Vital Signs:   Vitals:    11/22/24 0751   BP: 131/78   Pulse: (!) 48   Resp: 18   Temp: 97.5 °F (36.4 °C)   TempSrc: Oral   SpO2: 97%   Weight: 64.1 kg (141 lb 5 oz)   PainSc: 0-No pain     Body mass index is 22.13 kg/m².     Physical Exam  Constitutional:       General: He is not in acute distress.     Appearance: He is normal weight. He is not toxic-appearing.   HENT:      Head: Normocephalic and atraumatic.   Pulmonary:      Effort: Pulmonary effort is normal. No respiratory distress.   Skin:     General: Skin is warm.      Coloration: Skin is not jaundiced.      Findings: No lesion.   Neurological:      Mental Status: He is alert and oriented to person, place, and time. Mental status is at baseline.      Cranial Nerves: No cranial nerve deficit or facial asymmetry.      Motor: No weakness, tremor, atrophy, abnormal muscle tone, seizure activity or pronator drift.      Coordination: Finger-Nose-Finger Test normal.   Psychiatric:         Mood and Affect: Mood normal.         Behavior: Behavior normal.         Judgment: Judgment normal.         Results:   Radiographs: MRI Brain With & Without Contrast    Result Date: 11/19/2024  FINDINGS AND IMPRESSION: There are multiple scattered supratentorial and infratentorial enhancing brain masses, many of which demonstrate associated susceptibility effect suggestive of hemorrhage, and likely represent metastatic disease. Areas of vasogenic edema are present. Index peripheral enhancing mass within the anterior aspect of the left temporal lobe measures 3 x 2.1 x 2.4 cm and demonstrates internal susceptibility effect with what appears to be an associated fluid-fluid level suggestive of hemorrhage. Index avidly enhancing lesion within the right caudate measures 0.9 x 0.9 x 1 cm.  There appears to be left right midline shift measuring up to approximately 0.5 cm, as before. No  hydrocephalus.  Findings suggestive of moderate chronic ischemic small vessel degenerative areas.   This report was finalized on 11/19/2024 11:03 PM by Dr. Marin Benoit M.D on Workstation: [id number]      CT Head Without Contrast    Result Date: 11/19/2024  There are 5 lesions appreciated supratentorially the largest of which involves the left temporal lobe anteriorly and is somewhat cystic with layering blood products or hyperattenuating material. This measures approximately 2.8 x 2.3 x 2.7 cm in size. A heterogeneous lesion involving the left frontal lobe is appreciated measuring approximately 2 cm in size. An ovoid lesion involving the superior medial aspect of the left parietal lobe is appreciated with layering material measuring 12 mm in size. There is a subtle nodular component medially with blood products adjacent to an nearly encompassing the nodule anteriorly and medially. Nonhemorrhagic lesions are appreciated involving the head of the caudate nucleus on the right, the cortex of the right frontal lobe laterally and left parietal lobe posteriorly. The appearance is most consistent with multiple metastatic lesions a number of which are hemorrhagic. Further evaluation with a MRI examination brain with and without contrast is recommended. There is approximately 5 mm of midline shift to the right, unchanged.      Radiation dose reduction techniques were utilized, including automated exposure modulation based on body size.  This report was finalized on 11/19/2024 7:37 AM by Dr. Pedrito Ledesma M.D on Workstation: [id number]      CT Abdomen Pelvis With Contrast    Result Date: 11/19/2024  Electronically signed by Antonino Padron MD on 11-19-24 at 0719    CT Chest With Contrast Diagnostic    Result Date: 11/19/2024  Electronically signed by Antonino Padron MD on 11-19-24 at 0713    CT Angiogram Head    Result Date: 11/18/2024  1. No acute process identified on CT angiography of the head and neck with contrast.  2. Areas of hyperdense hemorrhage are seen in the left cerebral hemisphere with another focus of high attenuation in the right caudate head on precontrast images. Multiple additional enhancing lesions are seen following contrast as discussed above. Surrounding edema is seen. These findings are consistent with metastatic disease and some of these appear to have hemorrhaged.  Comment reviewed 3D] Radiation dose reduction techniques were utilized, including automated exposure control and exposure modulation based on body size.       CT Angiogram Neck    Result Date: 11/18/2024  1. No acute process identified on CT angiography of the head and neck with contrast. 2. Areas of hyperdense hemorrhage are seen in the left cerebral hemisphere with another focus of high attenuation in the right caudate head on precontrast images. Multiple additional enhancing lesions are seen following contrast as discussed above. Surrounding edema is seen. These findings are consistent with metastatic disease and some of these appear to have hemorrhaged.  Comment reviewed 3D] Radiation dose reduction techniques were utilized, including automated exposure control and exposure modulation based on body size.       CT Head Without Contrast    Result Date: 11/18/2024  Impression: 1.Acute intraparenchymal hemorrhage is noted in the left frontal, left temporal and left frontal parietal lobes. Findings may be secondary to hemorrhagic conversion of infarctions, amyloid angiopathy, hypertensive hemorrhages, hemorrhagic metastases, previous trauma and bleeding diathesis. 2.Acute hemorrhage in the right lateral ventricle. 3.MRI is suggested to further evaluate. I called results to Dr. Law Cole at the time of dictation. Electronically Signed: Toby Rivas MD  11/18/2024 3:57 PM EST  Workstation ID: [id number]   I personally reviewed the CT scan of the head without contrast as well as the MRI of the brain with and without contrast on 11/18/2024 and  11/19/2024, respectively.    Pathology: I personally reviewed the pathology from biopsy performed on 11/19/2024.    Labs:   WBC   Date Value Ref Range Status   11/21/2024 17.56 (H) 3.40 - 10.80 10*3/mm3 Final     Hemoglobin   Date Value Ref Range Status   11/21/2024 14.2 13.0 - 17.7 g/dL Final     Hematocrit   Date Value Ref Range Status   11/21/2024 40.3 37.5 - 51.0 % Final     Platelets   Date Value Ref Range Status   11/21/2024 190 140 - 450 10*3/mm3 Final       Assessment / Plan      Assessment/Plan:   Leon Lemon is a 79-year-old right handed gentleman with multiple intracranial contrast-enhancing lesions consistent with metastatic disease.  He additionally has multiple hepatic lesions consistent with hepatic metastases.  He does have a history of melanoma status post wide local excision in 2020.  He has improvement of presenting headaches and word finding difficulty with steroids.  ECOG 1    I discussed the clinical, radiographic and pathologic findings to date with Mr. Lemon and his wife.  I explained the role of radiotherapy in the management of intracranial metastatic disease, outlining the rationale for the treatment of brain metastases with radiation.  I explained that I expect his intracranial metastatic disease to represent metastatic melanoma given his clinical history and the radiographic appearance of these lesions.  In cases of melanoma with brain metastases, systemic therapy can result in marked response while avoiding the delivery of radiotherapy, whole brain radiotherapy or stereotactic radiosurgery.  I have rescheduled his liver biopsy for 11/25/2024 to be performed at [facility].  He will be evaluated by Dr. Martinez the next day.  I advised Mr. Bush to decrease his dexamethasone frequency to twice daily.  I additionally recommended discontinuation of Keppra in keeping with the [employer] Clinical Practice Guidelines as Mr. Lemon reports never experiencing  a seizure.  It is my hope that he can avoid radiotherapy to the brain if systemic therapy is initiated promptly after biopsy results are available.  He will follow-up with me in 1 month with repeat MRI of the brain at that time.      Galo Rodriguez MD  Radiation Oncology  [facility]    This document has been signed by Galo Rodriguez MD on November 22, 2024 09:00 EST

## 2024-11-22 NOTE — CASE MANAGEMENT/SOCIAL WORK
Case Management Discharge Note      Final Note: Pt discharged home with his spouse.   POPEYE Aguayo RN    Provided Post Acute Provider List?: N/A  N/A Provider List Comment: Pt denied need for any services at [facility].  Provided Post Acute Provider Quality & Resource List?: N/A  N/A Quality & Resource List Comment: Pt denied need for any services at [facility].    Selected Continued Care - Discharged on 11/21/2024 Admission date: 11/18/2024 - Discharge disposition: Home or Self Care      Destination    No services have been selected for the patient.                Durable Medical Equipment    No services have been selected for the patient.                Dialysis/Infusion    No services have been selected for the patient.                Home Medical Care    No services have been selected for the patient.                Therapy    No services have been selected for the patient.                Community Resources    No services have been selected for the patient.                Community & DME    No services have been selected for the patient.                    Transportation Services  Private: Car    Final Discharge Disposition Code: 01 - home or self-care

## 2024-11-22 NOTE — OUTREACH NOTE
Prep Survey      Flowsheet Row Responses   [facility] facility patient discharged from? [facility]   Is LACE score < 7 ? No   Eligibility Readm Mgmt   Discharge diagnosis Intracranial hemorrhage   Does the patient have one of the following disease processes/diagnoses(primary or secondary)? Other   Does the patient have Home health ordered? No   Is there a DME ordered? No   Prep survey completed? Yes            SCOOBY HUBBARD - Registered Nurse

## 2024-11-25 ENCOUNTER — HOSPITAL ENCOUNTER (OUTPATIENT)
Dept: CT IMAGING | Facility: HOSPITAL | Age: 79
Discharge: HOME | End: 2024-11-25
Admitting: RADIOLOGY
Payer: MEDICARE

## 2024-11-25 VITALS
DIASTOLIC BLOOD PRESSURE: 82 MMHG | RESPIRATION RATE: 16 BRPM | OXYGEN SATURATION: 98 % | HEART RATE: 48 BPM | SYSTOLIC BLOOD PRESSURE: 166 MMHG

## 2024-11-25 DIAGNOSIS — K76.9 LIVER LESION: ICD-10-CM

## 2024-11-25 LAB
APTT PPP: 33.7 SECONDS (ref 24.2–34.2)
INR PPP: 1.17 (ref 0.86–1.15)
PROTHROMBIN TIME: 15.1 SECONDS (ref 11.8–14.9)
WHOLE BLOOD HOLD SPECIMEN: NORMAL

## 2024-11-25 PROCEDURE — 88341 IMHCHEM/IMCYTCHM EA ADD ANTB: CPT | Performed by: RADIOLOGY

## 2024-11-25 PROCEDURE — 25010000002 FENTANYL CITRATE (PF) 50 MCG/ML SOLUTION: Performed by: RADIOLOGY

## 2024-11-25 PROCEDURE — 77012 CT SCAN FOR NEEDLE BIOPSY: CPT

## 2024-11-25 PROCEDURE — 88342 IMHCHEM/IMCYTCHM 1ST ANTB: CPT | Performed by: RADIOLOGY

## 2024-11-25 PROCEDURE — 85610 PROTHROMBIN TIME: CPT | Performed by: RADIOLOGY

## 2024-11-25 PROCEDURE — 85730 THROMBOPLASTIN TIME PARTIAL: CPT | Performed by: RADIOLOGY

## 2024-11-25 PROCEDURE — 88307 TISSUE EXAM BY PATHOLOGIST: CPT | Performed by: RADIOLOGY

## 2024-11-25 RX ORDER — FENTANYL CITRATE 50 UG/ML
INJECTION, SOLUTION INTRAMUSCULAR; INTRAVENOUS AS NEEDED
Status: COMPLETED | OUTPATIENT
Start: 2024-11-25 | End: 2024-11-25

## 2024-11-25 RX ORDER — LIDOCAINE HYDROCHLORIDE 20 MG/ML
INJECTION, SOLUTION INFILTRATION; PERINEURAL
Status: DISPENSED
Start: 2024-11-25 | End: 2024-11-25

## 2024-11-25 RX ADMIN — FENTANYL CITRATE 25 MCG: 50 INJECTION, SOLUTION INTRAMUSCULAR; INTRAVENOUS at 11:08

## 2024-11-25 NOTE — H&P
"  [facility]   Interventional Radiology H&P    Patient Name: Leon Lemon  : 1945  MRN: [id number]  Primary Care Physician:  Law Cole MD  Referring Physician: Galo Rodriguez MD  Date of admission: 2024    Subjective   Subjective     HPI:  Leon Lemon is a 79 y.o. male with multiple liver lesions on CT    Review of Systems:   Constitutional no fever,  no weight loss       Otolaryngeal no difficulty swallowing   Cardiovascular no chest pain   Pulmonary no cough, no sputum production   Gastrointestinal no constipation, no diarrhea                         Personal History       Past Medical/Surgical History:   Past Medical History:   Diagnosis Date    Arthritis     gout    Bradycardia     Cancer     Melanoma - surgery    High blood pressure     High cholesterol      Past Surgical History:   Procedure Laterality Date    SKIN CANCER EXCISION  2020    Melanoma excision       Social History:  reports that he has never smoked. He has quit using smokeless tobacco.  His smokeless tobacco use included chew. He reports that he does not currently use alcohol. He reports that he does not use drugs.    Medications:  (Not in a hospital admission)    Current medications:    Current IV drips:  No current facility-administered medications for this encounter.      Allergies:  No Known Allergies    Objective    Objective     Vitals:   Heart Rate:  [49] 49  Resp:  [16] 16  BP: (162)/(96) 162/96      Physical Exam:   Constitutional: Awake, alert, No acute distress    Respiratory: Clear to auscultation bilaterally, nonlabored respirations    Cardiovascular: RRR, no murmurs, rubs, or gallops      ASA SCALE ASSESSMENT:  3-Severe systemic disease that results in functional limitation    MALLAMPATI CLASSIFICATION:  2-Able to visualize the soft palate, fauces, uvula. The anterior & posterior tonsilar pillars are hidden by the tongue.       Result Review        Result Review:     No results found for: \"NA\"    " "No results found for: \"K\"    No results found for: \"CL\"    No results found for: \"PLASMABICARB\"    No results found for: \"BUN\"    No results found for: \"CREATININE\"    No results found for: \"CALCIUM\"        No components found for: \"GLUCOSE.*\"  Results from last 7 days   Lab Units 11/21/24  0641   WBC 10*3/mm3 17.56*   HEMOGLOBIN g/dL 14.2   HEMATOCRIT % 40.3   PLATELETS 10*3/mm3 190      Results from last 7 days   Lab Units 11/25/24  0859   INR  1.17*           Assessment / Plan     Assesment:   Multiple liver lesions      Plan:   CT guided liver lesion biopsy    The risks and benefits of the procedure were discussed with the patient.    Electronically signed by Toby Rivas MD, 11/25/24, 10:26 AM EST.    "

## 2024-11-26 ENCOUNTER — READMISSION MANAGEMENT (OUTPATIENT)
Dept: CALL CENTER | Facility: HOSPITAL | Age: 79
End: 2024-11-26
Payer: MEDICARE

## 2024-11-26 ENCOUNTER — TELEPHONE (OUTPATIENT)
Dept: SURGERY | Facility: CLINIC | Age: 79
End: 2024-11-26
Payer: MEDICARE

## 2024-11-26 ENCOUNTER — CONSULT (OUTPATIENT)
Dept: ONCOLOGY | Facility: HOSPITAL | Age: 79
End: 2024-11-26
Payer: MEDICARE

## 2024-11-26 ENCOUNTER — LAB (OUTPATIENT)
Dept: ONCOLOGY | Facility: HOSPITAL | Age: 79
End: 2024-11-26
Payer: MEDICARE

## 2024-11-26 VITALS
OXYGEN SATURATION: 98 % | RESPIRATION RATE: 20 BRPM | SYSTOLIC BLOOD PRESSURE: 169 MMHG | BODY MASS INDEX: 23.39 KG/M2 | HEART RATE: 58 BPM | HEIGHT: 67 IN | TEMPERATURE: 97.7 F | DIASTOLIC BLOOD PRESSURE: 88 MMHG | WEIGHT: 149 LBS

## 2024-11-26 DIAGNOSIS — C79.31 METASTASIS TO BRAIN: ICD-10-CM

## 2024-11-26 DIAGNOSIS — C43.4 MALIGNANT MELANOMA OF NECK: ICD-10-CM

## 2024-11-26 DIAGNOSIS — Z79.899 HIGH RISK MEDICATIONS (NOT ANTICOAGULANTS) LONG-TERM USE: ICD-10-CM

## 2024-11-26 DIAGNOSIS — C43.4 MALIGNANT MELANOMA OF NECK: Primary | ICD-10-CM

## 2024-11-26 LAB
ALBUMIN SERPL-MCNC: 3.9 G/DL (ref 3.5–5.2)
ALBUMIN/GLOB SERPL: 1.3 G/DL
ALP SERPL-CCNC: 132 U/L (ref 39–117)
ALT SERPL W P-5'-P-CCNC: 89 U/L (ref 1–41)
ANION GAP SERPL CALCULATED.3IONS-SCNC: 10.4 MMOL/L (ref 5–15)
AST SERPL-CCNC: 31 U/L (ref 1–40)
BASOPHILS # BLD AUTO: 0.02 10*3/MM3 (ref 0–0.2)
BASOPHILS NFR BLD AUTO: 0.1 % (ref 0–1.5)
BILIRUB SERPL-MCNC: 2.5 MG/DL (ref 0–1.2)
BUN SERPL-MCNC: 43 MG/DL (ref 8–23)
BUN/CREAT SERPL: 34.7 (ref 7–25)
CALCIUM SPEC-SCNC: 9 MG/DL (ref 8.6–10.5)
CHLORIDE SERPL-SCNC: 101 MMOL/L (ref 98–107)
CO2 SERPL-SCNC: 22.6 MMOL/L (ref 22–29)
CREAT SERPL-MCNC: 1.24 MG/DL (ref 0.76–1.27)
CYTO UR: NORMAL
DEPRECATED RDW RBC AUTO: 40.3 FL (ref 37–54)
EGFRCR SERPLBLD CKD-EPI 2021: 59.5 ML/MIN/1.73
EOSINOPHIL # BLD AUTO: 0 10*3/MM3 (ref 0–0.4)
EOSINOPHIL NFR BLD AUTO: 0 % (ref 0.3–6.2)
ERYTHROCYTE [DISTWIDTH] IN BLOOD BY AUTOMATED COUNT: 11.9 % (ref 12.3–15.4)
GLOBULIN UR ELPH-MCNC: 3.1 GM/DL
GLUCOSE SERPL-MCNC: 117 MG/DL (ref 65–99)
HCT VFR BLD AUTO: 43.2 % (ref 37.5–51)
HGB BLD-MCNC: 15 G/DL (ref 13–17.7)
IMM GRANULOCYTES # BLD AUTO: 0.12 10*3/MM3 (ref 0–0.05)
IMM GRANULOCYTES NFR BLD AUTO: 0.6 % (ref 0–0.5)
LAB AP CASE REPORT: NORMAL
LAB AP CLINICAL INFORMATION: NORMAL
LAB AP SPECIAL STAINS: NORMAL
LYMPHOCYTES # BLD AUTO: 0.39 10*3/MM3 (ref 0.7–3.1)
LYMPHOCYTES NFR BLD AUTO: 2.1 % (ref 19.6–45.3)
MCH RBC QN AUTO: 31.8 PG (ref 26.6–33)
MCHC RBC AUTO-ENTMCNC: 34.7 G/DL (ref 31.5–35.7)
MCV RBC AUTO: 91.7 FL (ref 79–97)
MONOCYTES # BLD AUTO: 1.2 10*3/MM3 (ref 0.1–0.9)
MONOCYTES NFR BLD AUTO: 6.3 % (ref 5–12)
NEUTROPHILS NFR BLD AUTO: 17.28 10*3/MM3 (ref 1.7–7)
NEUTROPHILS NFR BLD AUTO: 90.9 % (ref 42.7–76)
NRBC BLD AUTO-RTO: 0 /100 WBC (ref 0–0.2)
PATH REPORT.FINAL DX SPEC: NORMAL
PATH REPORT.GROSS SPEC: NORMAL
PLATELET # BLD AUTO: 174 10*3/MM3 (ref 140–450)
PMV BLD AUTO: 13.8 FL (ref 6–12)
POTASSIUM SERPL-SCNC: 4.8 MMOL/L (ref 3.5–5.2)
PROT SERPL-MCNC: 7 G/DL (ref 6–8.5)
RBC # BLD AUTO: 4.71 10*6/MM3 (ref 4.14–5.8)
RBC MORPH BLD: NORMAL
SMALL PLATELETS BLD QL SMEAR: ADEQUATE
SODIUM SERPL-SCNC: 134 MMOL/L (ref 136–145)
T4 FREE SERPL-MCNC: 1.44 NG/DL (ref 0.92–1.68)
TSH SERPL DL<=0.05 MIU/L-ACNC: 1.1 UIU/ML (ref 0.27–4.2)
WBC MORPH BLD: NORMAL
WBC NRBC COR # BLD AUTO: 19.01 10*3/MM3 (ref 3.4–10.8)

## 2024-11-26 PROCEDURE — 80053 COMPREHEN METABOLIC PANEL: CPT

## 2024-11-26 PROCEDURE — 84439 ASSAY OF FREE THYROXINE: CPT

## 2024-11-26 PROCEDURE — G0463 HOSPITAL OUTPT CLINIC VISIT: HCPCS | Performed by: INTERNAL MEDICINE

## 2024-11-26 PROCEDURE — 85025 COMPLETE CBC W/AUTO DIFF WBC: CPT

## 2024-11-26 PROCEDURE — 84443 ASSAY THYROID STIM HORMONE: CPT

## 2024-11-26 PROCEDURE — 36415 COLL VENOUS BLD VENIPUNCTURE: CPT

## 2024-11-26 PROCEDURE — 85007 BL SMEAR W/DIFF WBC COUNT: CPT

## 2024-11-26 RX ORDER — LIDOCAINE/PRILOCAINE 2.5 %-2.5%
1 CREAM (GRAM) TOPICAL AS NEEDED
Qty: 30 G | Refills: 1 | Status: SHIPPED | OUTPATIENT
Start: 2024-11-26

## 2024-11-26 RX ORDER — ONDANSETRON 8 MG/1
8 TABLET, FILM COATED ORAL 3 TIMES DAILY PRN
Qty: 30 TABLET | Refills: 5 | Status: SHIPPED | OUTPATIENT
Start: 2024-12-02

## 2024-11-26 RX ORDER — DEXAMETHASONE 2 MG/1
2 TABLET ORAL 2 TIMES DAILY WITH MEALS
Qty: 60 TABLET | Refills: 0 | Status: SHIPPED | OUTPATIENT
Start: 2024-11-26

## 2024-11-26 NOTE — ASSESSMENT & PLAN NOTE
Case discussed with Dr. Rodriguez, radiation oncology.  He would like to see how patient responds to systemic therapy prior to initiating or considering palliative radiation.  I will decrease his dexamethasone to 2 mg twice daily.  Hopefully he can wean over the next few weeks.  He will plan repeat MRI in 1 month.

## 2024-11-26 NOTE — ASSESSMENT & PLAN NOTE
Patient had liver biopsy yesterday.  Discussion with Dr. Kaur, pathology today confirms metastatic melanoma.  I will order PET scan but reviewing his imaging from [facility], he has diffuse metastatic foci.  He continues to have a good performance status, ECOG PS 0.  We discussed that this is unfortunately not curable but can be treated to offer prolongation of life as well as improvement in symptoms.  I will send next generation sequencing and liquid biopsy looking for actionable mutations.  Based on NCCN guidelines, I will initiate palliative treatment with immunotherapy-ipilimumab and nivolumab given every 3 weeks for 4 cycles followed by maintenance nivolumab every 4 weeks until progression or intolerance.  Side effects, risk and benefits discussed in detail.  Written teaching information provided.  We discussed venous access and patient would like to pursue a Port-A-Cath.  He will be referred to general surgery.  He will need a teaching session to review his regimen.  Lab work to ensure adequate endorgan functions and blood counts.  Zofran will be provided as needed for nausea.  Emla cream provided for port access.  I will plan his initial cycle in the near future.  I will see him back for cycle 2-day 1 with lab work prior to monitor for toxicities.

## 2024-11-26 NOTE — PROGRESS NOTES
Chief Complaint  MULTIPLE LESIONS OF METASTATIC MALIGNANCY       Law Cole MD    Subjective          Leon Lemon presents to [facility] for evaluation of brain and liver metastatic disease.  Patient has history of melanoma resected from left neck a few years ago.  He did not require any adjuvant treatment.  More recently he noted increasing headache and occasional word finding issues.  This prompted evaluation by his PCP with imaging of the head demonstrating multiple metastatic foci.  He was hospitalized at [facility] in [place] for further evaluation.  MRI brain showed multiple hemorrhagic metastatic foci with vasogenic edema.  He was started on dexamethasone with improvement of his symptoms.  He underwent liver biopsy for multiple liver lesions but it came back as nondiagnostic.  He was subsequent discharged.  He saw Dr. Rodriguez with radiation oncology last week for discussion of palliative radiation to the brain.  He ordered repeat liver biopsy for tissue diagnosis.  This was performed yesterday.  I discussed the case with Dr. Kaur, pathology who reviewed the slides today and it is consistent with metastatic melanoma.  Patient denies other masses, adenopathy, unusual aches or pains.  He can still perform all of his ADLs, ECOG PS 0.  He presents today for discussion of treatment options.    Oncology/Hematology History   Malignant melanoma of neck   11/26/2024 Initial Diagnosis    Malignant melanoma of neck     12/4/2024 -  Chemotherapy    OP MELANOMA Nivolumab 1 mg/kg / Ipilimumab 3 mg/kg / Nivolumab 480 mg          Current Outpatient Medications on File Prior to Visit   Medication Sig Dispense Refill    atorvastatin (LIPITOR) 10 MG tablet atorvastatin 10 mg oral tablet take 1 tablet (10 mg) by oral route once daily at bedtime   Active      carvedilol (COREG) 25 MG tablet Take 0.5 tablets by mouth 2 (Two) Times a Day. 90 tablet 3    famotidine (PEPCID)  20 MG tablet Take 1 tablet by mouth Daily. 30 tablet 0    levothyroxine (SYNTHROID, LEVOTHROID) 50 MCG tablet Take 1 tablet by mouth Daily.      [DISCONTINUED] dexAMETHasone (DECADRON) 4 MG tablet Take 1 tablet by mouth Every 6 (Six) Hours for 14 days. 56 tablet 0    doxazosin (CARDURA) 2 MG tablet Take 1 tablet by mouth Daily. (Patient not taking: Reported on 2024)      levETIRAcetam (KEPPRA) 500 MG tablet Take 1 tablet by mouth 2 (Two) Times a Day. (Patient not taking: Reported on 2024) 60 tablet 0     Current Facility-Administered Medications on File Prior to Visit   Medication Dose Route Frequency Provider Last Rate Last Admin    [] lidocaine (XYLOCAINE) 2% injection  - ADS Override Pull                No Known Allergies  Past Medical History:   Diagnosis Date    Arthritis     gout    Bradycardia     Cancer     Melanoma - surgery    High blood pressure     High cholesterol     Malignant melanoma of neck 2024    Metastasis to brain 2024     Past Surgical History:   Procedure Laterality Date    SKIN CANCER EXCISION      Melanoma excision     Social History     Socioeconomic History    Marital status:    Tobacco Use    Smoking status: Never    Smokeless tobacco: Former     Types: Chew   Vaping Use    Vaping status: Never Used   Substance and Sexual Activity    Alcohol use: Not Currently     Comment: rarely    Drug use: Never    Sexual activity: Not Currently     Partners: Female     Family History   Problem Relation Age of Onset    Cancer Brother     No Known Problems Mother     No Known Problems Father        Objective   Physical Exam  Vitals reviewed. Exam conducted with a chaperone present.   Neck:      Comments: Well-healed surgical incision on the left neck  Cardiovascular:      Rate and Rhythm: Normal rate and regular rhythm.      Heart sounds: Normal heart sounds. No murmur heard.     No gallop.   Pulmonary:      Effort: Pulmonary effort is normal.      Breath  "sounds: Normal breath sounds.   Musculoskeletal:      Right lower leg: No edema.      Left lower leg: No edema.   Lymphadenopathy:      Cervical: No cervical adenopathy.   Psychiatric:         Mood and Affect: Mood normal.         Behavior: Behavior normal.         Vitals:    11/26/24 1257   BP: 169/88   Pulse: 58   Resp: 20   Temp: 97.7 °F (36.5 °C)   TempSrc: Temporal   SpO2: 98%   Weight: 67.6 kg (149 lb)   Height: 170.2 cm (67\")   PainSc: 0-No pain     ECOG score: 0         PHQ-9 Total Score:                    Result Review :   The following data was reviewed by: Pedro Pablo Martinez MD on 11/26/2024:  Lab Results   Component Value Date    HGB 15.0 11/26/2024    HCT 43.2 11/26/2024    MCV 91.7 11/26/2024     11/26/2024    WBC 19.01 (H) 11/26/2024    NEUTROABS 17.28 (H) 11/26/2024    LYMPHSABS 0.39 (L) 11/26/2024    MONOSABS 1.20 (H) 11/26/2024    EOSABS 0.00 11/26/2024    BASOSABS 0.02 11/26/2024     Lab Results   Component Value Date    GLUCOSE 117 (H) 11/26/2024    BUN 43 (H) 11/26/2024    CREATININE 1.24 11/26/2024     (L) 11/26/2024    K 4.8 11/26/2024     11/26/2024    CO2 22.6 11/26/2024    CALCIUM 9.0 11/26/2024    PROTEINTOT 7.0 11/26/2024    ALBUMIN 3.9 11/26/2024    BILITOT 2.5 (H) 11/26/2024    ALKPHOS 132 (H) 11/26/2024    AST 31 11/26/2024    ALT 89 (H) 11/26/2024     Lab Results   Component Value Date    FREET4 1.44 11/26/2024    TSH 1.100 11/26/2024     No results found for: \"IRON\", \"LABIRON\", \"TRANSFERRIN\", \"TIBC\"  Lab Results   Component Value Date     (H) 11/19/2024     No results found for: \"PSA\", \"CEA\", \"AFP\", \"\", \"\"    Data reviewed : Radiologic studies CT head, chest, abdomen, pelvis reviewed    MRI brain reviewed  Hospital records including discharge summary and oncology consult reviewed     Assessment and Plan    Diagnoses and all orders for this visit:    1. Malignant melanoma of neck (Primary)  Assessment & Plan:  Patient had liver biopsy yesterday.  " Discussion with Dr. Kaur, pathology today confirms metastatic melanoma.  I will order PET scan but reviewing his imaging from [facility], he has diffuse metastatic foci.  He continues to have a good performance status, ECOG PS 0.  We discussed that this is unfortunately not curable but can be treated to offer prolongation of life as well as improvement in symptoms.  I will send next generation sequencing and liquid biopsy looking for actionable mutations.  Based on NCCN guidelines, I will initiate palliative treatment with immunotherapy-ipilimumab and nivolumab given every 3 weeks for 4 cycles followed by maintenance nivolumab every 4 weeks until progression or intolerance.  Side effects, risk and benefits discussed in detail.  Written teaching information provided.  We discussed venous access and patient would like to pursue a Port-A-Cath.  He will be referred to general surgery.  He will need a teaching session to review his regimen.  Lab work to ensure adequate endorgan functions and blood counts.  Zofran will be provided as needed for nausea.  Emla cream provided for port access.  I will plan his initial cycle in the near future.  I will see him back for cycle 2-day 1 with lab work prior to monitor for toxicities.    Orders:  -     CBC and Differential; Future  -     Comprehensive metabolic panel; Future  -     TSH; Future  -     T4, free; Future  -     ACTH; Future  -     Lab Appointment Request; Future  -     Clinic Appointment Request; Future  -     Infusion Appointment Request 3; Future  -     dexAMETHasone (DECADRON) 2 MG tablet; Take 1 tablet by mouth 2 (Two) Times a Day With Meals.  Dispense: 60 tablet; Refill: 0  -     [facility] xT DNA And RNA - Tissue,; Future  -     [facility] xF: xF+ Liquid Biopsy - 523 Genes; Future  -     [facility] xT Normal Blood - Blood,; Future  -     NM PET/CT Whole Body; Future  -     [facility] xT DNA And RNA - Tissue,  -     Ambulatory Referral to OP ONC Nutrition Services  -      Ambulatory Referral to General Surgery  -     lidocaine-prilocaine (EMLA) 2.5-2.5 % cream; Apply 1 Application topically to the appropriate area as directed As Needed for Injection Site Pain.  Dispense: 30 g; Refill: 1  -     ondansetron (ZOFRAN) 8 MG tablet; Take 1 tablet by mouth 3 (Three) Times a Day As Needed for Nausea or Vomiting.  Dispense: 30 tablet; Refill: 5    2. Metastasis to brain  Assessment & Plan:  Case discussed with Dr. Rodriguez, radiation oncology.  He would like to see how patient responds to systemic therapy prior to initiating or considering palliative radiation.  I will decrease his dexamethasone to 2 mg twice daily.  Hopefully he can wean over the next few weeks.  He will plan repeat MRI in 1 month.          I spent 69 minutes caring for Leon on this date of service. This time includes time spent by me in the following activities:preparing for the visit, reviewing tests, obtaining and/or reviewing a separately obtained history, performing a medically appropriate examination and/or evaluation , counseling and educating the patient/family/caregiver, ordering medications, tests, or procedures, referring and communicating with other health care professionals , documenting information in the medical record, independently interpreting results and communicating that information with the patient/family/caregiver, and care coordination    Patient Follow Up: Cycle 2-day 1    Patient was given instructions and counseling regarding his condition or for health maintenance advice. Please see specific information pulled into the AVS if appropriate.     Pedro Pablo Martinez MD    11/26/2024

## 2024-11-26 NOTE — OUTREACH NOTE
Medical Week 1 Survey      Flowsheet Row Responses   [facility] patient discharged from? [place]   Does the patient have one of the following disease processes/diagnoses(primary or secondary)? Other   Week 1 attempt successful? Yes   Call start time 1242   Call end time 1244   Discharge diagnosis Intracranial hemorrhage   Person spoke with today (if not patient) and relationship Spouse- Gladis   Meds reviewed with patient/caregiver? Yes   Does the patient have all medications ordered at discharge? Yes   Prescription comments No concerns.   Is the patient taking all medications as directed (includes completed medication regime)? Yes   Does the patient have a primary care provider?  Yes   Comments regarding PCP Patient is @ [facility]ology [facility]   Has home health visited the patient within 72 hours of discharge? N/A   Psychosocial issues? No   What is the patient's perception of their health status since discharge? Improving   Is the patient/caregiver able to teach back signs and symptoms related to disease process for when to call PCP? Yes   Is the patient/caregiver able to teach back signs and symptoms related to disease process for when to call 911? Yes   Is the patient/caregiver able to teach back the hierarchy of who to call/visit for symptoms/problems? PCP, Specialist, Home health nurse, Urgent Care, ED, 911 Yes   Week 1 call completed? Yes   Graduated Yes   Wrap up additional comments Patient improving taking one day at a time. No concerns or questions noted.   Call end time 1244            Lisa TRAVIS - Registered Nurse

## 2024-11-27 ENCOUNTER — PREP FOR SURGERY (OUTPATIENT)
Dept: OTHER | Facility: HOSPITAL | Age: 79
End: 2024-11-27
Payer: MEDICARE

## 2024-11-27 ENCOUNTER — DOCUMENTATION (OUTPATIENT)
Dept: PHARMACY | Facility: HOSPITAL | Age: 79
End: 2024-11-27
Payer: MEDICARE

## 2024-11-27 ENCOUNTER — TELEPHONE (OUTPATIENT)
Dept: NUTRITION | Facility: HOSPITAL | Age: 79
End: 2024-11-27
Payer: MEDICARE

## 2024-11-27 ENCOUNTER — HOSPITAL ENCOUNTER (INPATIENT)
Dept: CT IMAGING | Facility: HOSPITAL | Age: 79
DRG: 064 | End: 2024-11-27
Payer: MEDICARE

## 2024-11-27 DIAGNOSIS — C43.4 MALIGNANT MELANOMA OF NECK: Primary | ICD-10-CM

## 2024-11-27 LAB — TEMPUS PORTAL: NORMAL

## 2024-11-27 NOTE — TELEPHONE ENCOUNTER
I  spoke with pt wife, Gladis. Went over all instructions with her. Patient does not take any blood thinners/injections.    Dr. Greer you did surgery on this patient back in 2020. You excised left neck melanoma.   
I tried calling pt, no answer. LMOM    
Patient needs surgery for port. I have him scheduled on Monday, 12/2. [facility] has informed pt. I will call pt and go over details about surgery.  
Resident/Fellow

## 2024-11-27 NOTE — PROGRESS NOTES
Outpatient Nutrition Oncology Assessment    Patient Name: Leon Lemon  YOB: 1945  MRN: [id number]  Assessment Date: 11/27/2024    CLINICAL NUTRITION ASSESSMENT    Dx:  Malignant melanoma of L neck      Type of Cancer Treatment Planned:  Nivolumab, Ipilumumab          Reason for Assessment  Physician consult     H&P:    Past Medical History:   Diagnosis Date    Arthritis     gout    Bradycardia     Cancer     Melanoma - surgery    High blood pressure     High cholesterol     Liver lesion     Malignant melanoma of neck 11/26/2024    Metastasis to brain 11/26/2024             Anthropometrics       Row Name 11/27/24 1157          Anthropometrics    Calculation Weight 67.6 kg (149 lb 0.5 oz)                         Weight Hx  Wt Readings from Last 30 Encounters:   11/26/24 1257 67.6 kg (149 lb)   11/22/24 0751 64.1 kg (141 lb 5 oz)   11/20/24 0502 66.7 kg (147 lb 0.8 oz)   11/18/24 2300 67.3 kg (148 lb 5.9 oz)   11/18/24 1745 69.9 kg (154 lb 1.6 oz)   06/14/24 1202 69.9 kg (154 lb)   11/10/23 0936 70.8 kg (156 lb)   03/10/23 0937 75.3 kg (166 lb)   08/15/22 0923 76.7 kg (169 lb)   08/12/22 1136 77.1 kg (170 lb)   02/11/22 1127 78.9 kg (174 lb)   08/13/21 1039 78.9 kg (174 lb)   10/02/20 0000 79.4 kg (175 lb 2 oz)   08/11/20 0000 77.3 kg (170 lb 8 oz)   07/10/20 0000 77.1 kg (170 lb)   06/26/20 0000 77.1 kg (170 lb)   01/21/20 0000 81.6 kg (180 lb)   11/30/18 0000 86.2 kg (190 lb)         Estimated/Assessed Needs - Anthropometrics       Row Name 11/27/24 1157          Anthropometrics    Calculation Weight 67.6 kg (149 lb 0.5 oz)        Estimated/Assessed Needs    Additional Documentation Protein Requirements (Group);KCAL/KG (Group);Fluid Requirements (Group)        KCAL/KG    KCAL/KG 35 Kcal/Kg (kcal)     35 Kcal/Kg (kcal) 2366        Protein Requirements    Weight Used For Protein Calculations 67.6 kg (149 lb 0.5 oz)     Est Protein Requirement Amount (gms/kg) 1.5 gm protein     Estimated Protein  Requirements (gms/day) 101.4        Fluid Requirements    Fluid Requirements (mL/day) 2366                      Labs/Medications        Pertinent Labs Reviewed.   Results from last 7 days   Lab Units 11/26/24  1406 11/21/24  0641   SODIUM mmol/L 134* 137   POTASSIUM mmol/L 4.8 4.4   CHLORIDE mmol/L 101 104   CO2 mmol/L 22.6 20.4*   BUN mg/dL 43* 42*   CREATININE mg/dL 1.24 1.41*   CALCIUM mg/dL 9.0 8.7   BILIRUBIN mg/dL 2.5* 1.5*   ALK PHOS U/L 132* 150*   ALT (SGPT) U/L 89* 22   AST (SGOT) U/L 31 24   GLUCOSE mg/dL 117* 123*     Results from last 7 days   Lab Units 11/26/24  1406   HEMOGLOBIN g/dL 15.0   HEMATOCRIT % 43.2       Pertinent Medications atorvastatin, carvedilol, dexAMETHasone, famotidine, levothyroxine, lidocaine-prilocaine, and ondansetron     Current Nutrition Orders & Evaluation of Intake       Oral Nutrition     Current PO Diet 5 small, frequent meals (high kcal and high protein foods)   Supplement High protein oral nutrition shakes     Nutrition Diagnosis        Nutrition Dx Problem 1 Unintentional weight loss related to decreased ability to consume sufficient energy as evidenced by physiological causes increasing nutrient needs.       Nutrition Intervention       RD Action Nutrition assessment by review of medical record + interview of pt's wife (including):  Hx of weight changes  Current nutrition-related concerns  Current PO intake, diet, food tolerances    Discussed:  Ways to maximize pt's nutritional intake (in the form of foods & ONS)    Wife agreeable for written information to be provided to pt next week at chemo teaching appointment     Nutrition Recommendations       Recommendations Small, frequent meals (5-6 per day) of high kcal, high protein foods  High protein ONS BID- if continues to lose wt, suggested to alternate high protein with high kcal / high protein ONS BID     Monitor/Evaluation       Monitor Per oncology nutrition protocol.     Comments:    Nutrition referral per MD  request.  Pt with 8-9% wt decline over undetermined period of time (gap in wt records).  Pt has decreased appetite and therefore has experienced UWL.      Spoke to wife over the phone.  She reports above nutrition-related concerns.  Pt saw his PCP yesterday after consult with Medical Oncologist.  PCP gave some nutrition advice that they are now following:  consume nutrition shakes and consume 5 small, frequent meals daily.  Wife denies that pt has issues swallowing or with digestion.  Reinforced the small, frequent meals and gave basic ideas for maximizing pt's nutritional intake.  Encouraged high kcal/high protein nutrition shakes if pt continues to lose weight (Ensure Plus, Boost Plus, or [place] Breakfast Essentials mixed in whole milk).      Pt to be in the clinic for chemo teaching on 12/4/24.  Will provide written information to pt and wife on that day (recipes, ideas for maximizing kcals/protein in diet, tips for poor appetite, etc).  Oncology RD is available for further concerns.       Electronically signed by:  Mee Rasheed RD  11/27/24 12:03 EST

## 2024-11-27 NOTE — PRE-PROCEDURE INSTRUCTIONS
IMPORTANT INSTRUCTIONS - PRE-ADMISSION TESTING  DO NOT EAT OR CHEW anything after midnight the night before your procedure.    You may have CLEAR liquids up to __2___ hours prior to ARRIVAL time.   Take the following medications the morning of your procedure with JUST A SIP OF WATER:  CARVEDILOL, LEVOTHYROXINE, DECADRON, ZOFRAN IF NEEDED _______________________________________________________________________________________________________________________________________________________________________________________    DO NOT BRING your medications to the hospital with you, UNLESS something has changed since your PRE-Admission Testing appointment.  Hold all vitamins, supplements, and NSAIDS (Non- steroidal anti-inflammatory meds) for one week prior to surgery (you MAY take Tylenol or Acetaminophen).  If you are diabetic, check your blood sugar the morning of your procedure. If it is less than 70 or if you are feeling symptomatic, call the following number for further instructions: [phone]_.  Use your inhalers/nebulizers as usual, the morning of your procedure. BRING YOUR INHALERS with you.   Bring your CPAP or BIPAP to hospital, ONLY IF YOU WILL BE SPENDING THE NIGHT.   Make sure you have a ride home and have someone who will stay with you the day of your procedure after you go home.  If you have any questions, please call your Pre-Admission Testing Nurse, CHAO____ at [phone]_________.     PREOPERATIVE (BEFORE SURGERY)              BATHING INSTRUCTIONS  Instructions:    You will need to shower 1  times utilizing the soap provided; at the times indicated   below:     MORNING OF SURGERY 12/2/24   Wash your hair and face with normal shampoo and soap, rinse it well before using the surgical soap.      In the shower, wet the skin completely with water from your neck to your feet. Apply the cleanser to your   body ONLY FROM THE NECK TO YOUR FEET.     Do NOT USE THE CLEANSER ON YOUR FACE, HEAD, OR GENITAL  (PRIVATE) AREAS.   Keep it out of your eyes, ears, and mouth because of the risk of injury to those areas.      Scrub with a clean washcloth for each bath utilizing the soap provided from the top of your body to the   bottom starting at the neck area.      Pay close attention to your armpits, groin area, and the site of surgery.      Wash your body gently for 5 minutes. Stand outside the stream or turn off the water while scrubbing your   body. Do NOT wash with your regular soap after the surgical cleanser is used.      RINSE THE CLEANSER OFF COMPLETELY with plenty of water. Rinse the area again thoroughly.      Dry off with a clean towel. The surgical soap can cause dryness; however do NOT APPLY LOTION,   CREAM, POWDER, and/or DEODORANT AFTER SHOWERING.     Be sure to where clean clothes after showering.      Ensure CLEAN BED LINENS AFTER FIRST wash with the surgical soap.      NO PETS ALLOWED IN THE BED with you after utilizing the surgical soap.  Per anesthesia request, do not smoke for 24 hours before your procedure or as instructed by your surgeon.

## 2024-11-27 NOTE — PROGRESS NOTES
"PHARMACY C1D1 PRE VISIT ASSESSMENT    Patient will present for C1D1 of nivolumab 1 mg/kg + ipilimumab 3 mg/kg Q21D x 4 cycles followed by nivolumab 480 mg Q28D    79 y.o. male  Estimated body surface area is 1.78 meters squared as calculated from the following:    Height as of 11/26/24: 170.2 cm (67\").    Weight as of 11/26/24: 67.6 kg (149 lb).    Past Medical History:   Diagnosis Date    Arthritis     gout    Bradycardia     Cancer     Melanoma - surgery    High blood pressure     High cholesterol     Liver lesion     Malignant melanoma of neck 11/26/2024    Metastasis to brain 11/26/2024       Oncology/Hematology History   Malignant melanoma of neck   11/26/2024 Initial Diagnosis    Malignant melanoma of neck     12/4/2024 -  Chemotherapy    OP MELANOMA Nivolumab 1 mg/kg / Ipilimumab 3 mg/kg / Nivolumab 480 mg          ONC Staging:  Metastatic melanoma    Relevant Pathology:   Final Diagnosis   Liver lesion, biopsy:               - Metastatic malignant melanoma     The above positive (malignant) diagnosis was called to Dr. Martinez  at  13:10 EST on  11/26/2024 by NOEL.      Electronically signed by Haylie Kaur DO on 11/26/2024 at 1311     Potentially Actionable Mutation Present: NO - NGS PENDING    Relevant Imaging:    CT Needle Biopsy Liver    Result Date: 11/26/2024  Impression: Technically successful percutaneous biopsy of a liver lesion without evidence of complication. Electronically Signed: Toby Rivas MD  11/26/2024 11:06 AM EST  Workstation ID: [id number]    CT Angiogram Head    Result Date: 11/25/2024  1. No acute process identified on CT angiography of the head and neck with contrast. 2. Areas of hyperdense hemorrhage are seen in the left cerebral hemisphere with another focus of high attenuation in the right caudate head on precontrast images. Multiple additional enhancing lesions are seen following contrast as discussed above. Surrounding edema is seen. These findings are consistent with " metastatic disease and some of these have acutely or subacutely hemorrhaged.   Radiation dose reduction techniques were utilized, including automated exposure control and exposure modulation based on body size.   This report was finalized on 11/25/2024 10:27 AM by Dr. Amado Crystal M.D on Workstation: [id number]      CT Angiogram Neck    Result Date: 11/25/2024  1. No acute process identified on CT angiography of the head and neck with contrast. 2. Areas of hyperdense hemorrhage are seen in the left cerebral hemisphere with another focus of high attenuation in the right caudate head on precontrast images. Multiple additional enhancing lesions are seen following contrast as discussed above. Surrounding edema is seen. These findings are consistent with metastatic disease and some of these have acutely or subacutely hemorrhaged.   Radiation dose reduction techniques were utilized, including automated exposure control and exposure modulation based on body size.   This report was finalized on 11/25/2024 10:27 AM by Dr. Amado Crystal M.D on Workstation: [id number]      MRI Brain With & Without Contrast    Result Date: 11/19/2024  FINDINGS AND IMPRESSION: There are multiple scattered supratentorial and infratentorial enhancing brain masses, many of which demonstrate associated susceptibility effect suggestive of hemorrhage, and likely represent metastatic disease. Areas of vasogenic edema are present. Index peripheral enhancing mass within the anterior aspect of the left temporal lobe measures 3 x 2.1 x 2.4 cm and demonstrates internal susceptibility effect with what appears to be an associated fluid-fluid level suggestive of hemorrhage. Index avidly enhancing lesion within the right caudate measures 0.9 x 0.9 x 1 cm.  There appears to be left right midline shift measuring up to approximately 0.5 cm, as before. No hydrocephalus.  Findings suggestive of moderate chronic ischemic small vessel degenerative  areas.   This report was finalized on 11/19/2024 11:03 PM by Dr. Marin Benoit M.D on Workstation: [id number]      CT Head Without Contrast    Result Date: 11/19/2024  There are 5 lesions appreciated supratentorially the largest of which involves the left temporal lobe anteriorly and is somewhat cystic with layering blood products or hyperattenuating material. This measures approximately 2.8 x 2.3 x 2.7 cm in size. A heterogeneous lesion involving the left frontal lobe is appreciated measuring approximately 2 cm in size. An ovoid lesion involving the superior medial aspect of the left parietal lobe is appreciated with layering material measuring 12 mm in size. There is a subtle nodular component medially with blood products adjacent to an nearly encompassing the nodule anteriorly and medially. Nonhemorrhagic lesions are appreciated involving the head of the caudate nucleus on the right, the cortex of the right frontal lobe laterally and left parietal lobe posteriorly. The appearance is most consistent with multiple metastatic lesions a number of which are hemorrhagic. Further evaluation with a MRI examination brain with and without contrast is recommended. There is approximately 5 mm of midline shift to the right, unchanged.      Radiation dose reduction techniques were utilized, including automated exposure modulation based on body size.  This report was finalized on 11/19/2024 7:37 AM by Dr. Pedrito Ledesma M.D on Workstation: [id number]      CT Abdomen Pelvis With Contrast    Result Date: 11/19/2024  Electronically signed by Antonino Padron MD on 11-19-24 at 0719    CT Chest With Contrast Diagnostic    Result Date: 11/19/2024  Electronically signed by Antonino Padron MD on 11-19-24 at 0713    CT Head Without Contrast    Result Date: 11/18/2024  Impression: 1.Acute intraparenchymal hemorrhage is noted in the left frontal, left temporal and left frontal parietal lobes. Findings may be secondary to hemorrhagic  conversion of infarctions, amyloid angiopathy, hypertensive hemorrhages, hemorrhagic metastases, previous trauma and bleeding diathesis. 2.Acute hemorrhage in the right lateral ventricle. 3.MRI is suggested to further evaluate. I called results to Dr. Law Cole at the time of dictation. Electronically Signed: Toby Rivas MD  11/18/2024 3:57 PM EST  Workstation ID: [id number]      Current treatment regimen has insurance authorization approval? YES    Medication treatment plan entered is appropriate per [facility] Guidelines    Pharmacy Follow-up Considerations:   Patient with previous diagnosis of melanoma resected from the neck. Patient did not undergo any adjuvant treatment at that time. Patient presents with now metastatic disease to liver as well as brain metastases. Plan is for nivolumab + ipilimumab. NGS pending, will follow up results. Pharmacy will continue to monitor labs while on treatment and will  patient prior to first infusion on cycle 1 day 1.     John Ricardo, FangD, BCPS  11/27/2024  11:13 EST

## 2024-11-30 ENCOUNTER — HOSPITAL ENCOUNTER (EMERGENCY)
Facility: HOSPITAL | Age: 79
Discharge: HOME | End: 2024-11-30
Attending: EMERGENCY MEDICINE
Payer: MEDICARE

## 2024-11-30 VITALS
BODY MASS INDEX: 23.29 KG/M2 | WEIGHT: 148.37 LBS | TEMPERATURE: 97.5 F | RESPIRATION RATE: 20 BRPM | DIASTOLIC BLOOD PRESSURE: 73 MMHG | HEIGHT: 67 IN | OXYGEN SATURATION: 95 % | HEART RATE: 50 BPM | SYSTOLIC BLOOD PRESSURE: 128 MMHG

## 2024-11-30 DIAGNOSIS — K64.9 BLEEDING HEMORRHOIDS: Primary | ICD-10-CM

## 2024-11-30 LAB
ALBUMIN SERPL-MCNC: 3.8 G/DL (ref 3.5–5.2)
ALBUMIN/GLOB SERPL: 1.3 G/DL
ALP SERPL-CCNC: 131 U/L (ref 39–117)
ALT SERPL W P-5'-P-CCNC: 96 U/L (ref 1–41)
ANION GAP SERPL CALCULATED.3IONS-SCNC: 12.5 MMOL/L (ref 5–15)
AST SERPL-CCNC: 32 U/L (ref 1–40)
BASOPHILS # BLD AUTO: 0.02 10*3/MM3 (ref 0–0.2)
BASOPHILS NFR BLD AUTO: 0.1 % (ref 0–1.5)
BILIRUB SERPL-MCNC: 2.9 MG/DL (ref 0–1.2)
BUN SERPL-MCNC: 41 MG/DL (ref 8–23)
BUN/CREAT SERPL: 31.5 (ref 7–25)
CALCIUM SPEC-SCNC: 9.1 MG/DL (ref 8.6–10.5)
CHLORIDE SERPL-SCNC: 98 MMOL/L (ref 98–107)
CO2 SERPL-SCNC: 26.5 MMOL/L (ref 22–29)
CREAT SERPL-MCNC: 1.3 MG/DL (ref 0.76–1.27)
DEPRECATED RDW RBC AUTO: 41.8 FL (ref 37–54)
EGFRCR SERPLBLD CKD-EPI 2021: 56.2 ML/MIN/1.73
EOSINOPHIL # BLD AUTO: 0 10*3/MM3 (ref 0–0.4)
EOSINOPHIL NFR BLD AUTO: 0 % (ref 0.3–6.2)
ERYTHROCYTE [DISTWIDTH] IN BLOOD BY AUTOMATED COUNT: 12.3 % (ref 12.3–15.4)
GLOBULIN UR ELPH-MCNC: 2.9 GM/DL
GLUCOSE SERPL-MCNC: 98 MG/DL (ref 65–99)
HCT VFR BLD AUTO: 44.6 % (ref 37.5–51)
HEMOCCULT STL QL IA: NEGATIVE
HGB BLD-MCNC: 15.3 G/DL (ref 13–17.7)
HOLD SPECIMEN: NORMAL
IMM GRANULOCYTES # BLD AUTO: 0.32 10*3/MM3 (ref 0–0.05)
IMM GRANULOCYTES NFR BLD AUTO: 1.5 % (ref 0–0.5)
LYMPHOCYTES # BLD AUTO: 0.72 10*3/MM3 (ref 0.7–3.1)
LYMPHOCYTES NFR BLD AUTO: 3.3 % (ref 19.6–45.3)
MCH RBC QN AUTO: 31.7 PG (ref 26.6–33)
MCHC RBC AUTO-ENTMCNC: 34.3 G/DL (ref 31.5–35.7)
MCV RBC AUTO: 92.3 FL (ref 79–97)
MONOCYTES # BLD AUTO: 2.29 10*3/MM3 (ref 0.1–0.9)
MONOCYTES NFR BLD AUTO: 10.4 % (ref 5–12)
NEUTROPHILS NFR BLD AUTO: 18.69 10*3/MM3 (ref 1.7–7)
NEUTROPHILS NFR BLD AUTO: 84.7 % (ref 42.7–76)
NRBC BLD AUTO-RTO: 0 /100 WBC (ref 0–0.2)
PLATELET # BLD AUTO: 178 10*3/MM3 (ref 140–450)
PMV BLD AUTO: 13.3 FL (ref 6–12)
POTASSIUM SERPL-SCNC: 5.1 MMOL/L (ref 3.5–5.2)
PROT SERPL-MCNC: 6.7 G/DL (ref 6–8.5)
RBC # BLD AUTO: 4.83 10*6/MM3 (ref 4.14–5.8)
SODIUM SERPL-SCNC: 137 MMOL/L (ref 136–145)
WBC NRBC COR # BLD AUTO: 22.04 10*3/MM3 (ref 3.4–10.8)
WHOLE BLOOD HOLD COAG: NORMAL

## 2024-11-30 PROCEDURE — 85025 COMPLETE CBC W/AUTO DIFF WBC: CPT | Performed by: EMERGENCY MEDICINE

## 2024-11-30 PROCEDURE — 80053 COMPREHEN METABOLIC PANEL: CPT | Performed by: EMERGENCY MEDICINE

## 2024-11-30 PROCEDURE — 99283 EMERGENCY DEPT VISIT LOW MDM: CPT

## 2024-11-30 PROCEDURE — 82274 ASSAY TEST FOR BLOOD FECAL: CPT | Performed by: EMERGENCY MEDICINE

## 2024-11-30 RX ORDER — HYDROCORTISONE ACETATE 25 MG/1
25 SUPPOSITORY RECTAL 2 TIMES DAILY
Qty: 24 EACH | Refills: 1 | Status: SHIPPED | OUTPATIENT
Start: 2024-11-30 | End: 2024-12-09

## 2024-11-30 NOTE — ED PROVIDER NOTES
Time: 7:16 AM EST  Date of encounter:  11/30/2024  Independent Historian/Clinical History and Information was obtained by:   Patient    History is limited by: N/A    Chief Complaint: Rectal bleeding      History of Present Illness:  Patient is a 79 y.o. year old male who presents to the emergency department for evaluation of rectal bleeding.  Patient has a history of metastatic melanoma, hypertension who presents with complaints of rectal bleeding.  States that he had a bowel movement on Wednesday and had some blood noted in it.  Did not have a bowel movement on Thursday but then had another 1 yesterday that had blood in the stool and on the toilet paper.  States he has had some constipation issues.  He is not on any blood thinners.  Does report that he did have a biopsy done earlier in the week on his liver.  Denies any abdominal pain.  No other complaints this time.      Patient Care Team  Primary Care Provider: Law Cole MD    Past Medical History:     No Known Allergies  Past Medical History:   Diagnosis Date    Arthritis     gout    Bradycardia     Cancer     Melanoma - surgery    High blood pressure     High cholesterol     Liver lesion     Malignant melanoma of neck 11/26/2024    Metastasis to brain 11/26/2024     Past Surgical History:   Procedure Laterality Date    LIVER BIOPSY      SKIN CANCER EXCISION  2020    Melanoma excision     Family History   Problem Relation Age of Onset    Cancer Brother     No Known Problems Mother     No Known Problems Father        Home Medications:  Prior to Admission medications    Medication Sig Start Date End Date Taking? Authorizing Provider   atorvastatin (LIPITOR) 10 MG tablet atorvastatin 10 mg oral tablet take 1 tablet (10 mg) by oral route once daily at bedtime   Active    Provider, MD Aneta   carvedilol (COREG) 25 MG tablet Take 0.5 tablets by mouth 2 (Two) Times a Day.  Patient taking differently: Take 1 tablet by mouth 2 (Two) Times a Day. 11/10/23    "Tadeo Rodriguez MD   dexAMETHasone (DECADRON) 2 MG tablet Take 1 tablet by mouth 2 (Two) Times a Day With Meals.  Patient taking differently: Take 0.5 mg by mouth 2 (Two) Times a Day With Meals. 11/26/24   Pedro Pablo Martinez MD   famotidine (PEPCID) 20 MG tablet Take 1 tablet by mouth Daily. 11/22/24   Dann Mart MD   levothyroxine (SYNTHROID, LEVOTHROID) 50 MCG tablet Take 1 tablet by mouth Daily. 6/22/21   ProviderAneta MD   lidocaine-prilocaine (EMLA) 2.5-2.5 % cream Apply 1 Application topically to the appropriate area as directed As Needed for Injection Site Pain. 11/26/24   Pedro Pablo Martinez MD   ondansetron (ZOFRAN) 8 MG tablet Take 1 tablet by mouth 3 (Three) Times a Day As Needed for Nausea or Vomiting. 12/2/24   ePdro Pablo Martinez MD        Social History:   Social History     Tobacco Use    Smoking status: Never    Smokeless tobacco: Former     Types: Chew   Vaping Use    Vaping status: Never Used   Substance Use Topics    Alcohol use: Not Currently     Comment: rarely    Drug use: Never         Review of Systems:  Review of Systems   Gastrointestinal:  Positive for blood in stool.        Physical Exam:  /84 (BP Location: Left arm, Patient Position: Sitting)   Pulse 52   Temp 97.5 °F (36.4 °C) (Oral)   Resp 20   Ht 170.2 cm (67\")   Wt 67.3 kg (148 lb 5.9 oz)   SpO2 97%   BMI 23.24 kg/m²     Physical Exam  Vitals and nursing note reviewed.   Constitutional:       Appearance: Normal appearance.   HENT:      Head: Normocephalic and atraumatic.   Eyes:      General: No scleral icterus.  Cardiovascular:      Rate and Rhythm: Normal rate and regular rhythm.      Heart sounds: Normal heart sounds.   Pulmonary:      Effort: Pulmonary effort is normal.      Breath sounds: Normal breath sounds.   Abdominal:      Palpations: Abdomen is soft.      Tenderness: There is no abdominal tenderness.   Genitourinary:     Comments: Patient with significantly enlarged external hemorrhoids with some " stigmata of bleeding.  There was no significant blood noted in the stool.  Musculoskeletal:         General: Normal range of motion.      Cervical back: Normal range of motion.   Skin:     Findings: No rash.   Neurological:      General: No focal deficit present.      Mental Status: He is alert.                  Procedures:  Procedures      Medical Decision Making:      Comorbidities that affect care:    Cancer, hypertension    External Notes reviewed:    Reviewed note from 11/18/2024  Reviewed biopsy note from 11/25/2024    The following orders were placed and all results were independently analyzed by me:  Orders Placed This Encounter   Procedures    Comprehensive Metabolic Panel    Occult Blood, Fecal By Immunoassay - Stool, Per Rectum    CBC Auto Differential    CBC & Differential    Extra Tubes    Gold Top - SST    Light Blue Top       Medications Given in the Emergency Department:  Medications - No data to display     ED Course:         Labs:    Lab Results (last 24 hours)       Procedure Component Value Units Date/Time    Occult Blood, Fecal By Immunoassay - Stool, Per Rectum [[id number]]  (Normal) Collected: 11/30/24 0738    Specimen: Stool from Per Rectum Updated: 11/30/24 0804     Occult Blood, Fecal by Immunoassay Negative    CBC & Differential [[id number]]  (Abnormal) Collected: 11/30/24 0747    Specimen: Blood Updated: 11/30/24 0758    Narrative:      The following orders were created for panel order CBC & Differential.  Procedure                               Abnormality         Status                     ---------                               -----------         ------                     CBC Auto Differential[[id number]]        Abnormal            Final result               Scan Slide[[id number]]                                                                    Please view results for these tests on the individual orders.    Comprehensive Metabolic Panel [[id number]]  (Abnormal) Collected: 11/30/24 0747     Specimen: Blood Updated: 11/30/24 0824     Glucose 98 mg/dL      BUN 41 mg/dL      Creatinine 1.30 mg/dL      Sodium 137 mmol/L      Potassium 5.1 mmol/L      Chloride 98 mmol/L      CO2 26.5 mmol/L      Calcium 9.1 mg/dL      Total Protein 6.7 g/dL      Albumin 3.8 g/dL      ALT (SGPT) 96 U/L      AST (SGOT) 32 U/L      Alkaline Phosphatase 131 U/L      Total Bilirubin 2.9 mg/dL      Globulin 2.9 gm/dL      A/G Ratio 1.3 g/dL      BUN/Creatinine Ratio 31.5     Anion Gap 12.5 mmol/L      eGFR 56.2 mL/min/1.73     Narrative:      GFR Normal >60  Chronic Kidney Disease <60  Kidney Failure <15    The GFR formula is only valid for adults with stable renal function between ages 18 and 70.    CBC Auto Differential [[id number]]  (Abnormal) Collected: 11/30/24 0747    Specimen: Blood Updated: 11/30/24 0758     WBC 22.04 10*3/mm3      RBC 4.83 10*6/mm3      Hemoglobin 15.3 g/dL      Hematocrit 44.6 %      MCV 92.3 fL      MCH 31.7 pg      MCHC 34.3 g/dL      RDW 12.3 %      RDW-SD 41.8 fl      MPV 13.3 fL      Platelets 178 10*3/mm3      Neutrophil % 84.7 %      Lymphocyte % 3.3 %      Monocyte % 10.4 %      Eosinophil % 0.0 %      Basophil % 0.1 %      Immature Grans % 1.5 %      Neutrophils, Absolute 18.69 10*3/mm3      Lymphocytes, Absolute 0.72 10*3/mm3      Monocytes, Absolute 2.29 10*3/mm3      Eosinophils, Absolute 0.00 10*3/mm3      Basophils, Absolute 0.02 10*3/mm3      Immature Grans, Absolute 0.32 10*3/mm3      nRBC 0.0 /100 WBC              Imaging:    No Radiology Exams Resulted Within Past 24 Hours      Differential Diagnosis and Discussion:    GI Bleeding: Differential diagnosis includes but is not limited to gastritis, gastric ulcer, stress ulcer, duodenitis, Kayla-Patel tears, esophageal varices, angiodysplasia, aortic enteric fistula, hematologic issues including thrombocytopenia, GI neoplasm, ulcerative colitis, Crohn's disease, diverticulosis, diverticulitis, hemorrhoids, aortic aneurysm, and  polyps  Rectal pain: Differential diagnosis includes but is not limited to anal fissure, hemorrhoids, proctitis, perianal abscess, rectal prolapse, anal fistula, inflammatory bowel disease, STIs, pelvic floor dysfunction, rectal ulcer, rectal foreign body, and infection.    All labs were reviewed and interpreted by me.    MDM     Patient is a 79-year-old who presents with complaints of rectal bleeding.  States he has a history of metastatic melanoma and hypertension who states he has been having some blood in his stool.  He has been straining to poop the last few times.  It is only happened when he is try to go to the bathroom.  On exam he has significant external hemorrhoids with some stigmata of bleeding.  There is no thrombosed hemorrhoids.  The stool was negative for cold.  His hemoglobin is stable.  His white count is elevated but he is currently on steroids.  He is otherwise states he has been feeling well.  He has stool softeners at home.  Would also give treatment for his hemorrhoids.  Should he have new or worsening symptoms return to the emergency room.  Will DC.                Patient Care Considerations:          Consultants/Shared Management Plan:    None    Social Determinants of Health:    Patient is independent, reliable, and has access to care.       Disposition and Care Coordination:    Discharged: The patient is suitable and stable for discharge with no need for consideration of admission.    I have explained the patient´s condition, diagnoses and treatment plan based on the information available to me at this time. I have answered questions and addressed any concerns. The patient has a good  understanding of the patient´s diagnosis, condition, and treatment plan as can be expected at this point. The vital signs have been stable. The patient´s condition is stable and appropriate for discharge from the emergency department.      The patient will pursue further outpatient evaluation with the primary  care physician or other designated or consulting physician as outlined in the discharge instructions. They are agreeable to this plan of care and follow-up instructions have been explained in detail. The patient has received these instructions in written format and have expressed an understanding of the discharge instructions. The patient is aware that any significant change in condition or worsening of symptoms should prompt an immediate return to this or the closest emergency department or call to 911.      Final diagnoses:   Bleeding hemorrhoids        ED Disposition       ED Disposition   Discharge    Condition   Stable    Comment   --               This medical record created using voice recognition software.             Jose Mcnair MD  11/30/24 0882

## 2024-12-02 NOTE — H&P
Chief Complaint:  No chief complaint on file.    Primary Care Provider: Law Cole MD      History of Present Illness  Leon Lemon is a 79 y.o. male here to have a port-a-catheter placed.    The patient was diagnosed with metastatic melanoma.    The patient is going to receive IV therapy and port-a-catheter placement is needed.    Allergies: Patient has no known allergies.    Outpatient Medications Marked as Taking for the 12/2/24 encounter (Hospital Encounter)   Medication Sig Dispense Refill    atorvastatin (LIPITOR) 10 MG tablet atorvastatin 10 mg oral tablet take 1 tablet (10 mg) by oral route once daily at bedtime   Active      carvedilol (COREG) 25 MG tablet Take 0.5 tablets by mouth 2 (Two) Times a Day. (Patient taking differently: Take 1 tablet by mouth 2 (Two) Times a Day.) 90 tablet 3    dexAMETHasone (DECADRON) 2 MG tablet Take 1 tablet by mouth 2 (Two) Times a Day With Meals. (Patient taking differently: Take 0.5 mg by mouth 2 (Two) Times a Day With Meals.) 60 tablet 0    famotidine (PEPCID) 20 MG tablet Take 1 tablet by mouth Daily. 30 tablet 0    hydrocortisone (ANUSOL-HC) 25 MG suppository Insert 1 suppository into the rectum 2 (Two) Times a Day. 24 each 1    levothyroxine (SYNTHROID, LEVOTHROID) 50 MCG tablet Take 1 tablet by mouth Daily.      phenylephrine-shark liver oil-mineral oil-petrolatum (PREPARATION H) 0.25-3-14-71.9 % rectal ointment Insert  into the rectum 2 (Two) Times a Day As Needed for Hemorrhoids. 28.4 g 0       Past Medical History:    Arthritis    gout    Bradycardia    Cancer    Melanoma - surgery    High blood pressure    High cholesterol    Liver lesion    Malignant melanoma of neck    Metastasis to brain        Past Surgical History:    LIVER BIOPSY    SKIN CANCER EXCISION    Melanoma excision       Family History:   Family History   Problem Relation Age of Onset    Cancer Brother     No Known Problems Mother     No Known Problems Father         Social  "History:  Social History     Tobacco Use    Smoking status: Never    Smokeless tobacco: Former     Types: Chew   Substance Use Topics    Alcohol use: Not Currently     Comment: rarely       Objective     Vital Signs:  /86 (BP Location: Right arm, Patient Position: Sitting)   Pulse 50   Temp 97.3 °F (36.3 °C) (Temporal)   Resp 16   Ht 170.2 cm (67\")   Wt 64.5 kg (142 lb 3.2 oz)   SpO2 96%   BMI 22.27 kg/m²   Respiratory:  breathing not labored, respiratory effort appears normal  Cardiovascular:  heart regular rate  Musculoskeletal: moving all extremities symmetrically and purposefully  Neurologic:  no obvious motor or sensory deficits    Assessment:  Diagnoses and all orders for this visit:    1. Malignant melanoma of neck  -     ceFAZolin 2000 mg IVPB in 100 mL NS (VTB)    Other orders  -     Follow Anesthesia Guidelines / Protocol; Standing  -     Verify / Perform Chlorhexidine Skin Prep; Standing  -     Place Sequential Compression Device; Standing  -     Maintain Sequential Compression Device; Standing  -     Follow Anesthesia Guidelines / Protocol  -     Verify / Perform Chlorhexidine Skin Prep  -     Place Sequential Compression Device  -     Maintain Sequential Compression Device  -     Vital Signs - Per Anesthesia Protocol; Standing  -     Remove Scopolamine Patch 24 Hours After Application; Standing  -     Continuous Pulse Oximetry; Standing  -     Insert Peripheral IV; Standing  -     lactated ringers infusion  -     acetaminophen (TYLENOL) tablet 1,000 mg  -     Remove Scopolamine Patch 24 Hours After Application  -     Continuous Pulse Oximetry  -     Insert Peripheral IV        Plan:  Port-a-catheter placement    Discussion: Indications, options, risks, benefits, and expected outcomes of planned surgery were discussed with the patient and he agrees to proceed.    Blane Greer MD  12/02/2024    Electronically signed by Blane Greer MD, 12/02/24, 7:02 AM EST.        "

## 2024-12-02 NOTE — OP NOTE
INSERTION VENOUS ACCESS DEVICE  Procedure Report    Patient Name:  Leon Lemon  YOB: 1945    Date of Surgery:  12/2/2024     Pre-op Diagnosis:   Malignant melanoma of neck [C43.4]    Pre-Op Diagnosis Codes:      * Malignant melanoma of neck [C43.4]       Post-op Diagnosis:   Post-Op Diagnosis Codes:     * Malignant melanoma of neck [C43.4]    Procedure:  Venous access device placement (CPT 04393)    Staff:  Surgeon(s):  Blane Greer MD    Assistant: Leslie Grigsby (not billable assistant)    Anesthesia: General    Estimated Blood Loss: Minimal    Complications:  None    Drains:  None    Packing:  None    Implants:    Implant Name Type Inv. Item Serial No.  Lot No. LRB No. Used Action   KT PRT POWERPORT CLEARVUE MOD/BUMP INTERMD 8F 45CM - [id number] Implant KT PRT POWERPORT CLEARVUE MOD/BUMP INTERMD 8F 45CM  [employer] [id number] Right 1 Implanted     Specimen: None    Indications:  See my preop note.     Findings:  Bard PowerPort ClearVUE Implantable Port placed via right subclavian vein.    Description of Procedure: Patient was taken to the operating room and placed supine on the operating table.  Timeout verification was performed. MAC anesthesia was administered.  Patient was prepped and draped in usual fashion.   A needle was attached to an empty syringe and 1 pass was made with a needle under the right clavicle and the right subclavian vein was successfully accessed.   A guidewire was placed through the needle and the needle was withdrawn.  Fluoroscopy was used to confirm the guidewire was positioned appropriately in the superior vena cava.  A subcutaneous pocket was created at the right upper chest to accommodate the port.  The inner dilator was placed inside of the tear-away sheath and both were placed over the guidewire into the right subclavian vein.  The inner guidewire and dilator were removed.  The catheter was placed through the tear-away sheath and the  sheath was withdrawn.  Then under direct visualization with fluoroscopy, the catheter was pulled back until the tip was positioned appropriately in the superior vena cava.  The catheter was cut to the appropriate length and the locking cap was placed onto the catheter.   The catheter was connected to the port, the locking cap was secured, and the port was placed within the subcutaneous pocket.  I accessed the port with a Soto needle.  I could easily aspirate venous blood.  The port was then flushed with heparinized solution.  The wound was closed appropriately with buried absorbable suture followed by appropriate dressings.  No complications.  Sponge needle and instrument counts were correct.  Patient was transported to the recovery area in stable condition.      Blane Greer MD     Date: 12/2/2024  Time: 08:29 EST    Electronically signed by Blane Greer MD, 12/02/24, 8:29 AM EST.

## 2024-12-02 NOTE — PROGRESS NOTES
CHEMOTHERAPY PREPARATION    Leon Lemon  [id number]  1945    Chief Complaint:   Chemotherapy Education    History of present illness:  Leon Lemon is a 79 y.o. year old male who is here today for chemotherapy preparation and needs assessment.  The patient has been diagnosed with Melanoma and is scheduled to begin treatment with opdivo and yervoy every 21 days for 4 cycles, then opdivo alone every 28 days.     Oncology History:    Oncology/Hematology History   Malignant melanoma of neck   11/26/2024 Initial Diagnosis    Malignant melanoma of neck     12/4/2024 -  Chemotherapy    OP MELANOMA Nivolumab 1 mg/kg / Ipilimumab 3 mg/kg / Nivolumab 480 mg        The current medication list and allergy list were reviewed and reconciled.     Past Medical History, Past Surgical History, Social History, Family History have been reviewed and are without significant changes except as mentioned.    Review of Systems:    Review of Systems   Constitutional:  Positive for appetite change.   HENT: Negative.     Eyes: Negative.    Respiratory: Negative.     Cardiovascular: Negative.    Gastrointestinal: Negative.    Endocrine: Negative.    Genitourinary: Negative.    Musculoskeletal: Negative.    Skin: Negative.    Allergic/Immunologic: Negative.    Neurological: Negative.    Hematological: Negative.    Psychiatric/Behavioral: Negative.     All other systems reviewed and are negative.    Physical Exam:    Physical Exam  Vitals and nursing note reviewed.   Constitutional:       Appearance: Normal appearance.   HENT:      Head: Normocephalic.   Cardiovascular:      Rate and Rhythm: Normal rate.   Pulmonary:      Effort: Pulmonary effort is normal.   Skin:     Coloration: Skin is not jaundiced.   Neurological:      Mental Status: He is alert.   Psychiatric:         Mood and Affect: Mood normal.         Behavior: Behavior normal.         Thought Content: Thought content normal.         Judgment: Judgment normal.          Vitals:     12/04/24 0952   BP: 135/81   Pulse: 51   Resp: 16   Temp: 98.4 °F (36.9 °C)   SpO2: 97%     Vitals:    12/04/24 0952   PainSc: 0-No pain          ECOG: {findings; ecog performance status:02507            NEEDS ASSESSMENTS    VAD Assessment  The patient and I discussed planned intravenous chemotherapy as well as other IV treatments that are often needed throughout the course of treatment. These may include, but are not limited to blood transfusions, antibiotics, and IV hydration. The vasculature does appear to be adequate for multiple peripheral IVs throughout their treatment course. Discussed risks and benefits of VADs. The patient would like to pursue Port-A-Cath insertion prior to initiation of treatment.     Palliative Care  The patient and I discussed palliative care services. Palliative care is not the same as Hospice care. This is specialized medical care for people living with serious illness with the goal of improving quality of life for the patient and their family. [facility] has partnered with [facility] to offer our patients outpatient palliative care early along with their treatment to assist in coordination of care, symptom management, pain management, and medical decision making.  Oncology criteria for palliative care referral is not met at this time. The patient is not interested in a palliative care consultation.     Additional Referral needs  none  CHEMOTHERAPY EDUCATION    Booklets Given: Chemotherapy and You [x]  Eating Hints [x]    Sexuality/Fertility Books []      Chemotherapy/Biotherapy Education Sheets: (list all that apply)  nausea management, acid reflux management, diarrhea management, Cancer resourse contacts information, skin and mouth care, and vaccination information     Education Materials attached to AVS:  Chemotherapy; Preparing for Chemotherapy Adult; Precautions When Utilizing Cytotoxic Medicine                                                                                                                                                                 Chemotherapy Regimen:   Treatment Plans       Name Type Hold Status Plan Dates Plan Provider       Active    OP MELANOMA Nivolumab 1 mg/kg / Ipilimumab 3 mg/kg / Nivolumab 480 mg  ONCOLOGY TREATMENT On Automatic Hold 11/26/2024 - Present Pedro Pablo Martinez MD                   TOPICS EDUCATION PROVIDED COMMENTS   ANEMIA:  role of RBC, cause, s/s, ways to manage, role of transfusion [x]    THROMBOCYTOPENIA:  role of platelet, cause, s/s, ways to prevent bleeding, things to avoid, when to seek help [x]    NEUTROPENIA:  role of WBC, cause, infection precautions, s/s of infection, when to call MD [x]    NUTRITION & APPETITE CHANGES:  importance of maintaining healthy diet & weight, ways to manage to improve intake, dietary consult, exercise regimen [x]    DIARRHEA:  causes, s/s of dehydration, ways to manage, dietary changes, when to call MD [x]    CONSTIPATION:  causes, ways to manage, dietary changes, when to call MD [x]    NAUSEA & VOMITING:  cause, use of antiemetics, dietary changes, when to call MD [x]    MOUTH SORES:  causes, oral care, ways to manage [x]    ALOPECIA:  cause, ways to manage, resources [x]    INFERTILITY & SEXUALITY:  causes, fertility preservation options, sexuality changes, ways to manage, importance of birth control []    NERVOUS SYSTEM CHANGES:  causes, s/s, neuropathies, cognitive changes, ways to manage [x]    PAIN:  causes, ways to manage [x]    SKIN & NAIL CHANGES:  cause, s/s, ways to manage [x]    ORGAN TOXICITIES:  cause, s/s, need for diagnostic tests, labs, when to notify MD [x]    SURVIVORSHIP:  distress, distress assessment, secondary malignancies, early/late effects, follow-up, social issues, social support []    HOME CARE:  use of spill kits, storing of PO chemo, how to manage bodily fluids [x]    MISCELLANEOUS:  drug interactions, administration, vesicant, et [x]        Assessment and Plan:    Diagnoses and all  orders for this visit:    1. Malignant melanoma of neck (Primary)    2. Encounter for health education  Comments:  Chemotherapy/Immunotherapy      The patient and I have reviewed their cancer diagnosis and scheduled treatment plan. Needs assessment was completed including genetics, psychosocial needs, barriers to care, VAD evaluation, advanced care planning, and palliative care services. Referrals have been ordered as appropriate based upon our evaluation and patient desires.     Chemotherapy teaching was also completed today as documented above. Adequate time was given to answer all questions to his satisfaction. Patient and family are aware of their care team members and contact information if they have questions or problems throughout the treatment course. The patient is adequately prepared to begin treatment as scheduled.     Reviewed with patient education regarding zofran prescriptions sent to pharmacy.       I advised the patient that they can take Tylenol or ibuprofen as needed for aches/pains related to cancer/treatment.  I also advised that they can use Senokot or MiraLAX as needed for constipation or Imodium as needed for diarrhea.       I reviewed with the patient the care team members. I also reviewed the option of the urgent care clinic through our oncology office for evaluation and management of symptoms related to treatment.    I spent 45 minutes caring for Leon on this date of service. This time includes time spent by me in the following activities: preparing for the visit, reviewing tests, obtaining and/or reviewing a separately obtained history, performing a medically appropriate examination and/or evaluation, counseling and educating the patient/family/caregiver, ordering medications, tests, or procedures, documenting information in the medical record, and independently interpreting results and communicating that information with the patient/family/caregiver.

## 2024-12-02 NOTE — DISCHARGE INSTRUCTIONS
Dr. Greer's Instructions          DIET  Resume your regular diet.     ACTIVITY  Do not lift anything greater than 15 pounds with the arm on the same side the port was placed for one week.  After one week, you have no activity restrictions and may gradually increase your activities using common sense.     WOUND CARE & SHOWERING/BATHING  Your incision is covered with a plastic type material that will flake off on its own in the next few weeks.  If the plastic type material has not flaked off on its own by 2 weeks after your surgery then use tweezers to pull it off.  You have sutures in your incision but they are placed below the level of the skin and they will slowly dissolve (they do not need to be removed).  The skin around your incision will likely have some bruising.  This is normal.  You can shower beginning tomorrow but wait two weeks after your surgery before taking any tub baths.     HOME MEDICATIONS  Resume all of your normal home medications.     PAIN CONTROL  You will receive a narcotic pain medicine prescription before you are discharged home.  Be sure to take the narcotic pain medication with some food so as not to upset your stomach.  Do not drive while you are taking the prescription pain medication.  Take Tylenol for mild pain.     BOWEL MOVEMENTS  It is not unusual for narcotic pain medications to cause constipation.  Also, the medications and anesthesia you received for your surgery can have a constipating effect.  To help avoid constipation, drink at least four eight-ounce glasses of water each day and use over-the-counter laxatives/stool softeners (dulcolax, milk of magnesia, senokot, etc.).  I recommend drinking the aforementioned amount of water daily and taking 30 ml of milk of magnesia two times each day while you are using the prescribed narcotic pain medication.  If your bowel movements become too loose or too frequent, then simply stop following these recommendations unless you start to  feel constipated.     FOLLOW-UP VISIT & QUESTIONS/CONCERNS  Call Dr. Topete office at [phone] and schedule a follow-up appointment for about two weeks after your surgery date.  However, if you are doing fine and don´t have any concerns then simply cancel the follow-up appointment.  Should you have any questions or concerns, please call Dr. Topete office.

## 2024-12-02 NOTE — ANESTHESIA PREPROCEDURE EVALUATION
Anesthesia Evaluation     Patient summary reviewed and Nursing notes reviewed   no history of anesthetic complications:   NPO Solid Status: > 8 hours  NPO Liquid Status: > 2 hours           Airway   Mallampati: II  TM distance: >3 FB  Neck ROM: full  No difficulty expected  Dental          Pulmonary - negative pulmonary ROS and normal exam    breath sounds clear to auscultation  Cardiovascular - normal exam  Exercise tolerance: good (4-7 METS)    Patient on routine beta blocker and Beta blocker given within 24 hours of surgery  Rhythm: regular  Rate: normal    (+) hypertension (coreg) less than 2 medications, hyperlipidemia      Neuro/Psych- negative ROS  GI/Hepatic/Renal/Endo    (+) GERD (pepcid), liver disease, thyroid problem hypothyroidism    Musculoskeletal     Abdominal  - normal exam   Substance History - negative use     OB/GYN negative ob/gyn ROS         Other   arthritis,   history of cancer    ROS/Med Hx Other: PAT Nursing Notes unavailable.               Anesthesia Plan    ASA 3     general and MAC     (Patient understands anesthesia not responsible for dental damage.)  intravenous induction     Anesthetic plan, risks, benefits, and alternatives have been provided, discussed and informed consent has been obtained with: patient.    Use of blood products discussed with patient .    Plan discussed with CRNA.    CODE STATUS:

## 2024-12-02 NOTE — ANESTHESIA POSTPROCEDURE EVALUATION
Patient: Leon Lemon    Procedure Summary       Date: 12/02/24 Room / Location: [facility]  /  [facility]    Anesthesia Start: 0716 Anesthesia Stop: 0817    Procedure: INSERTION VENOUS ACCESS DEVICE Diagnosis:       Malignant melanoma of neck      (Malignant melanoma of neck [C43.4])    Surgeons: Blane Greer MD Provider: Katy Martin MD    Anesthesia Type: general, MAC ASA Status: 3            Anesthesia Type: general, MAC    Vitals  Vitals Value Taken Time   /74 12/02/24 0901   Temp 36.1 °C (96.9 °F) 12/02/24 0815   Pulse 51 12/02/24 0902   Resp 18 12/02/24 0815   SpO2 96 % 12/02/24 0902   Vitals shown include unfiled device data.        Post Anesthesia Care and Evaluation    Patient location during evaluation: bedside  Patient participation: complete - patient participated  Level of consciousness: awake  Pain management: adequate    Airway patency: patent  PONV Status: none  Cardiovascular status: acceptable and stable  Respiratory status: acceptable  Hydration status: acceptable

## 2024-12-04 PROBLEM — Z45.2 ENCOUNTER FOR ADJUSTMENT OR MANAGEMENT OF VASCULAR ACCESS DEVICE: Status: ACTIVE | Noted: 2024-01-01

## 2024-12-05 NOTE — PROGRESS NOTES
Outpatient Nutrition Oncology Follow Up    Patient Name: Leon Lemon  YOB: 1945  MRN: [id number]    Recommendation(s): No further recommendations at this time    Reason for Consult:  F/U after nutrition referral, phone consult, and providing written information     Today's Weight:   66.8 kg    Weight Change:  1.1% loss in the past week; previously experienced 8-9% wt decline in (estimated) 6-9 months    Consult or Intervention:  Pt here for chemo teaching yesterday 12/4/24.  Written information was provided to pt and wife after phone consult given 11/27/24. Please refer to Oncology RD progress noet 11/27/24.   Followed up today at pt's first infusion (Nivolumab, Ipilimumab).  Wife reports they are trying to get on a consistent schedule with smaller, more frequent meals.  Answered questions for pt and wife.  No further concerns at this time.      Nutrition Diagnosis: Unintentional weight loss related to increased nutrient needs due to catabolic disease as evidenced by physiological causes increasing nutrient needs., hypermetabolic state., inadequate energy intake., decreased appetite., and family report.    Plan: Will follow up per oncology nutrition protocol

## 2024-12-05 NOTE — PROGRESS NOTES
"Presents for C1D1 of nivolumab and ipilimumab     Patient was educated on information about each medication including dose, route, frequency, and common adverse effects. Patient was provided both verbal and written counseling. UpToDate Lexidrug adult patient education printed and provided to patient and key information verbally highlighted including: Overview of regimen including but not limited to infusion times; recognition and management of allergic/infusion reactions; \"call your doctor right away\" parameters.     All of the patient's questions were answered and patient expressed verbal understanding    "

## 2024-12-06 NOTE — TELEPHONE ENCOUNTER
Leon Lemon 1945       Symptoms    Does patient have nausea and vomiting? N    Does patient have medications prescribed for N/V? Y    Does patient have diarrhea? N    Does the patient have diarrhea medications? N    Does the patient have pain? Y    Is pain medications effective? Y    Discharge  Do you have any questions about your discharge instructions? N    Patient Satisfaction with [facility]     Where you satisfied with the care you received? Y    Pt suggestions for the Cancer Care Center    Do you have any suggestions to improve your care? N    Comments    Follow up phone call comments

## 2024-12-07 PROBLEM — E87.5 HYPERKALEMIA: Status: ACTIVE | Noted: 2024-01-01

## 2024-12-07 PROBLEM — C43.9 METASTATIC MELANOMA: Status: ACTIVE | Noted: 2024-01-01

## 2024-12-07 PROBLEM — A41.9 SEVERE SEPSIS: Status: ACTIVE | Noted: 2024-01-01

## 2024-12-07 PROBLEM — K63.1 BOWEL PERFORATION: Status: ACTIVE | Noted: 2024-01-01

## 2024-12-07 PROBLEM — I46.9 CARDIAC ARREST: Status: ACTIVE | Noted: 2024-01-01

## 2024-12-07 PROBLEM — R65.20 SEVERE SEPSIS: Status: ACTIVE | Noted: 2024-01-01

## 2024-12-07 NOTE — PROGRESS NOTES
"[facility] Clinical Pharmacy Services: Vancomycin Pharmacokinetic Initial Consult Note    Leon Lemon is a 79 y.o. male who is on day 1 of pharmacy to dose vancomycin for Empiric.    Consult Information  Consulting Provider: Delmy  Planned Duration of Therapy: 7 days   Was Patient Receiving Prior to Admission/Consult?: No  Loading Dose Ordered or Given: 1250 mg on 24 at 0200  PK/PD Target: -600 mg/L.hr   Relevant ID History: no  Other Antimicrobials: Piperacillin/Tazobactam    Imaging Reviewed?: Yes    Microbiology Data  MRSA PCR performed: No; Result: Not ordered due to excluded indication or presence of suspected abscess  Culture/Source:       Vitals/Labs  Ht: 170.2 cm (67\"); Wt: 66.4 kg (146 lb 6.2 oz)  Temp (24hrs), Av.1 °F (36.7 °C), Min:98.1 °F (36.7 °C), Max:98.1 °F (36.7 °C)   Estimated Creatinine Clearance: 24.5 mL/min (A) (by C-G formula based on SCr of 2.33 mg/dL (H)).       Results from last 7 days   Lab Units 24  0306 24  2356 24  2117 24  0818 24  0747   CREATININE mg/dL 2.33* 2.16*  --  1.03 1.30*   WBC 10*3/mm3  --   --  66.09* 15.73* 22.04*     Assessment/Plan:    Vancomycin Dose: pulse dosing with 1250 mg IV once on 24 at 0200  Vanc Random ordered for 24 at 1400  Patient has order for Basic Metabolic Panel    Pharmacy will follow patient's kidney function and will adjust doses and obtain levels as necessary. Thank you for involving pharmacy in this patient's care. Please contact pharmacy with any questions or concerns.                           Derek Hare [facility]  Clinical Pharmacist   "

## 2024-12-07 NOTE — H&P
[facility]   HISTORY AND PHYSICAL    Patient Name: Leon Lemon  : 1945  MRN: [id number]  Primary Care Physician:  Law Cole MD  Date of admission: 2024    Subjective   Subjective     Chief Complaint: Abdominal pain, shortness of breath    HPI:    Leon Lemon is a 79 y.o. 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  When seen patient was intubated and on vent so history was given by family at bedside.  Per family patient started chemotherapy infusion last 2 days prior and while at home he was complaining of significant abdominal pain with nausea and vomiting, shortness of breath, flank pain, and hematuria.  Patient had been taking Zofran at home with little to no relief.  Due to concerns patient was brought to the ED for further evaluation.  In the ED patient was tachypneic, hypoxic, and hypotensive.  Patient eventually went to cardiac arrest but ROSC was achieved after a few rounds of CPR.  Labs showed severe leukocytosis with bandemia, lactic acidosis, severe anemia, and hypokalemia.  CT abdomen showed extensive soft tissue gas along the entire left psoas muscle and pelvis as well as intra-abdominal gas.  Surgery was consulted but given the patient's clinical status he was deemed not a surgical candidate.  Family decided to make the patient DNR given his poor prognosis although they wanted full support until they can speak with the remaining family members.  Patient admitted to the intensive care unit for further management        Review of Systems   Patient intubated and on vent    Personal History     Past Medical History:   Diagnosis Date    Arthritis     gout    Bradycardia     Cancer     Melanoma - surgery    Cardiac arrest 2024    High blood pressure     High cholesterol     Liver lesion      Malignant melanoma of neck 11/26/2024    Metastasis to brain 11/26/2024       Past Surgical History:   Procedure Laterality Date    LIVER BIOPSY      SKIN CANCER EXCISION  2020    Melanoma excision    VENOUS ACCESS DEVICE (PORT) INSERTION N/A 12/2/2024    Procedure: INSERTION VENOUS ACCESS DEVICE;  Surgeon: Blane Greer MD;  Location: [facility] OR [facility];  Service: General;  Laterality: N/A;       Family History: family history includes Cancer in his brother; No Known Problems in his father and mother. Otherwise pertinent FHx was reviewed and not pertinent to current issue.    Social History:  reports that he has never smoked. He has quit using smokeless tobacco.  His smokeless tobacco use included chew. He reports that he does not currently use alcohol. He reports that he does not use drugs.    Home Medications:  HYDROcodone-acetaminophen, amLODIPine, atorvastatin, carvedilol, dexAMETHasone, famotidine, hydrocortisone, levothyroxine, lidocaine-prilocaine, ondansetron, and phenylephrine-shark liver oil-mineral oil-petrolatum      Allergies:  No Known Allergies    Objective   Objective     Vitals:   Temp:  [98.1 °F (36.7 °C)] 98.1 °F (36.7 °C)  Heart Rate:  [0-95] 0  Resp:  [17-35] 22  BP: ()/(14-69) 131/56  Flow (L/min) (Oxygen Therapy):  [4] 4  FiO2 (%):  [50 %-70 %] 70 %  Physical Exam    Constitutional: Intubated and on vent   Eyes: PERRLA, sclerae anicteric, no conjunctival injection   HENT: Dry mucous membrane   Neck: Supple, neck mass   Respiratory: Intubated and on HEENT   Cardiovascular: RRR, no murmurs, rubs, or gallops, palpable pedal pulses bilaterally   Gastrointestinal: Positive bowel sounds, soft, nondistended   Musculoskeletal: No bilateral ankle edema, no clubbing or cyanosis to extremities   Psychiatri unable to evaluate   Neurologic pupils reactive   Skin: No rashes     Result Review    Result Review:  I have personally reviewed the results from the time of this admission to 12/7/2024 05:02 EST  and agree with these findings:  [x]  Laboratory list / accordion  []  Microbiology  [x]  Radiology  [x]  EKG/Telemetry   []  Cardiology/Vascular   []  Pathology  []  Old records  []  Other:  Most notable findings include: Leukocytosis, lactic acidosis, metabolic acidosis, and with nasal cannula hypoxia complaining hyperal difficulty, CT with soft tissue gas along the psoas, L5 vertebral artery, and inguinal canal      Assessment & Plan   Assessment / Plan     Brief Patient Summary:  Leon Lemon is a 79 y.o. 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     Active Hospital Problems:  Active Hospital Problems    Diagnosis     **Cardiac arrest     Hyperkalemia     Severe sepsis     Metastatic melanoma     Bowel perforation     Malignant melanoma of neck     Metastasis to brain     Cerebral edema     History of melanoma excision      Plan:     Cardiac arrest  -Admit to intensive care unit  -ROSC achieved in ED  -Intubated and on vent  -Possible bowel perforation seen on CT  -Severe leukocytosis, lactic acidosis  -Hypokalemia  -Patient not surgical candidate  -Resume pressor support  -Sedation  -Empiric antibiotics  -Blood cultures  -General Surgery following  -Intensivist consulted  -Patient with very poor prognosis  -DNR per family    Possible bowel perforation  -Nonsurgical candidate  -DNR    Septic shock    Metastatic melanoma    GI ppx  DVT ppx      VTE Prophylaxis:  Mechanical VTE prophylaxis orders are signed & held. Mechanical VTE prophylaxis orders are present.        CODE STATUS:    Level Of Support Discussed With: Health Care Surrogate; Next of Kin (If No Surrogate)  Code Status (Patient has no pulse and is not breathing): No CPR (Do Not Attempt to Resuscitate)  Medical Interventions (Patient has pulse or is breathing): Full  Support    Admission Status:  I believe this patient meets inpatient status.      Electronically signed by Corey Brock MD, 12/07/24, 5:02 AM EST.

## 2024-12-07 NOTE — NURSING NOTE
Received report from Rn   [facility] ED @ [id number], 0297 pt arrived DNR /DNI , On vent , maxed on Levophed and epi, Upon arrival pt placed on monitoring equipment , cleaned up for family to see , While clean pt , HR dropped to 50, then to 30, Wife at bedside when patient passed , Ruled out for SAL , corner released Body, pt on units for 15 minutes when he pasted

## 2024-12-07 NOTE — PROGRESS NOTES
GENERAL SURGERY    I was briefly told about this patient while seeing another patient in the emergency department.  I reviewed his scans and labs and then came down to see the patient.    In the interim of me reviewing the scan and coming back down to the emergency department the patient had a code.  He is currently intubated and hypotensive.    In my review of his scan he has a bowel perforation with very extensive air dissecting into his retroperitoneum.  This is indicative of a pretty severe associated infection with a bowel perforation.  Given his current condition and associated cancer and recent chemotherapy in addition to his now recent code, I think he is very unlikely to survive this event.    I have not had a chance to intimately examine the patient or talk with the family.  He was still finishing up his code situation at my evaluation.    At this point, he is likely too unstable for operation.  I we will reevaluate as he is stabilized.  With his diffuse metastatic disease recent code, recent stroke and foreboding CT I think he may be best suited for a palliative care consult.

## 2024-12-07 NOTE — ED PROVIDER NOTES
Time: 9:12 PM EST  Date of encounter:  12/6/2024  Independent Historian/Clinical History and Information was obtained by:   Patient and Family    History is limited by: Acuity of Condition    Chief Complaint   Patient presents with    Blood in Urine    Pain    Fatigue     Pt states he started cancer treatment yesterday and started having symptoms of blood in urine fatigue and pain all over. PT states that he has nausea vomiting and abdominal pain as well     Abdominal Pain         History of Present Illness:  Patient is a 79 y.o. year old male who presents to the emergency department for evaluation of mild shortness of air left flank pain hematuria and abdominal pain x 1 day.  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  Patient currently seen by [facility] oncology Dr. Martinez.  Patient received his first dose of C1D1 chemotherapy.  Patient reports he has been taking Zofran for ongoing nausea but is having ongoing left flank pain and abdominal pain.  Denies any vomiting but reports some nausea.  Denies chest pain or current shortness of air.  Denies any head injuries or loss of consciousness.  Denies anticoagulation.    Patient Care Team  Primary Care Provider: Law Cole MD    Past Medical History:     No Known Allergies  Past Medical History:   Diagnosis Date    Arthritis     gout    Bradycardia     Cancer     Melanoma - surgery    Cardiac arrest 12/7/2024    High blood pressure     High cholesterol     Liver lesion     Malignant melanoma of neck 11/26/2024    Metastasis to brain 11/26/2024     Past Surgical History:   Procedure Laterality Date    LIVER BIOPSY      SKIN CANCER EXCISION  2020    Melanoma excision    VENOUS ACCESS DEVICE (PORT) INSERTION N/A 12/2/2024    Procedure: INSERTION VENOUS ACCESS DEVICE;  Surgeon: Blane Greer MD;  Location: [facility] OR [facility] [place];  Service: General;  Laterality: N/A;     Family History   Problem Relation Age of Onset     Cancer Brother     No Known Problems Mother     No Known Problems Father        Home Medications:  Prior to Admission medications    Medication Sig Start Date End Date Taking? Authorizing Provider   amLODIPine (NORVASC) 2.5 MG tablet Take 1 tablet by mouth Daily. 11/26/24   Aneta Saleh MD   atorvastatin (LIPITOR) 10 MG tablet atorvastatin 10 mg oral tablet take 1 tablet (10 mg) by oral route once daily at bedtime   Active    Aneta Saleh MD   carvedilol (COREG) 25 MG tablet Take 0.5 tablets by mouth 2 (Two) Times a Day.  Patient taking differently: Take 1 tablet by mouth 2 (Two) Times a Day. 11/10/23   Tadeo Rodriguez MD   dexAMETHasone (DECADRON) 2 MG tablet Take 1 tablet by mouth 2 (Two) Times a Day With Meals.  Patient taking differently: Take 0.5 mg by mouth 2 (Two) Times a Day With Meals. 11/26/24   Pedro Pablo Martinez MD   famotidine (PEPCID) 20 MG tablet Take 1 tablet by mouth Daily. 11/22/24   Dann Mart MD   HYDROcodone-acetaminophen (Norco) 5-325 MG per tablet Take 1 tablet by mouth Every 6 (Six) Hours As Needed for Moderate Pain or Severe Pain.  Patient not taking: Reported on 12/4/2024 12/2/24   Blane Greer MD   hydrocortisone (ANUSOL-HC) 25 MG suppository Insert 1 suppository into the rectum 2 (Two) Times a Day. 11/30/24   Jose Mcnair MD   levothyroxine (SYNTHROID, LEVOTHROID) 50 MCG tablet Take 1 tablet by mouth Daily. 6/22/21   Aneta Saleh MD   lidocaine-prilocaine (EMLA) 2.5-2.5 % cream Apply 1 Application topically to the appropriate area as directed As Needed for Injection Site Pain.  Patient not taking: Reported on 12/4/2024 11/26/24   Pedro Pablo Martinez MD   ondansetron (ZOFRAN) 8 MG tablet Take 1 tablet by mouth 3 (Three) Times a Day As Needed for Nausea or Vomiting.  Patient not taking: Reported on 12/4/2024 12/2/24   Pedro Pablo Martinez MD   phenylephrine-shark liver oil-mineral oil-petrolatum (PREPARATION H) 0.25-3-14-71.9 % rectal ointment Insert  " into the rectum 2 (Two) Times a Day As Needed for Hemorrhoids. 11/30/24   Jose Mcnair MD        Social History:   Social History     Tobacco Use    Smoking status: Never    Smokeless tobacco: Former     Types: Chew   Vaping Use    Vaping status: Never Used   Substance Use Topics    Alcohol use: Not Currently     Comment: rarely    Drug use: Never         Review of Systems:  Review of Systems   Constitutional:  Negative for chills and fever.   HENT:  Negative for congestion, ear pain and sore throat.    Eyes:  Negative for pain.   Respiratory:  Positive for shortness of breath. Negative for cough and chest tightness.    Cardiovascular:  Negative for chest pain.   Gastrointestinal:  Positive for abdominal pain and nausea. Negative for diarrhea and vomiting.   Genitourinary:  Positive for flank pain and hematuria.   Musculoskeletal:  Negative for joint swelling.   Skin:  Negative for pallor.   Neurological:  Negative for seizures and headaches.   All other systems reviewed and are negative.       Physical Exam:  /56 (BP Location: Left arm, Patient Position: Lying)   Pulse (!) 0 Comment: 2 RN verification pt asystole  Temp 98.1 °F (36.7 °C)   Resp 22   Ht 170.2 cm (67\")   Wt 66.4 kg (146 lb 6.2 oz)   SpO2 94%   BMI 22.93 kg/m²         Physical Exam  Vitals and nursing note reviewed.   Constitutional:       General: He is not in acute distress.     Appearance: Normal appearance. He is ill-appearing. He is not toxic-appearing.      Comments: Patient appears ill and in pain.   HENT:      Head: Normocephalic and atraumatic.      Jaw: There is normal jaw occlusion.   Eyes:      General: Lids are normal.      Extraocular Movements: Extraocular movements intact.      Conjunctiva/sclera: Conjunctivae normal.      Pupils: Pupils are equal, round, and reactive to light.   Cardiovascular:      Rate and Rhythm: Normal rate and regular rhythm.      Pulses: Normal pulses.      Heart sounds: Normal heart sounds. "   Pulmonary:      Effort: Pulmonary effort is normal. No respiratory distress.      Breath sounds: Normal breath sounds. No wheezing or rhonchi.   Abdominal:      General: Abdomen is flat. There is no distension.      Palpations: Abdomen is soft.      Tenderness: There is abdominal tenderness in the left upper quadrant and left lower quadrant. There is no guarding or rebound.   Musculoskeletal:         General: Normal range of motion.      Cervical back: Normal range of motion and neck supple.      Right lower leg: No edema.      Left lower leg: No edema.   Skin:     General: Skin is warm and dry.      Coloration: Skin is not cyanotic.   Neurological:      Mental Status: He is alert and oriented to person, place, and time. Mental status is at baseline.   Psychiatric:         Attention and Perception: Attention and perception normal.         Mood and Affect: Mood normal.            Procedures:  Intubation    Date/Time: 12/7/2024 12:19 AM    Performed by: Nando Curiel MD  Authorized by: Nando Curiel MD    Consent:     Consent obtained:  Emergent situation  Pre-procedure details:     Indications: cardio/pulmonary arrest      Patient status:  Unresponsive    Look externally: no concerns      Mouth opening - incisor distance:  2 finger widths    Hyoid-mental distance: 2 finger widths      Hyoid-thyroid distance: 2 or more finger widths      Mallampati score:  II    Obstruction: none      Neck mobility: normal      Pharmacologic strategy: none      Induction agents:  None    Paralytics:  None  Procedure details:     Preoxygenation:  Bag valve mask    CPR in progress: yes      Number of attempts:  1  Successful intubation attempt details:     Intubation method:  Oral    Intubation technique: direct      Laryngoscope blade:  Mac 3    Bougie used: no      Grade view: II      Tube size (mm):  8.0    Tube type:  Cuffed    Tube visualized through cords: yes    Placement assessment:     ETT at teeth/gumline (cm):  24    Tube  secured with:  ETT woo    Breath sounds:  Equal    Placement verification: chest rise, colorimetric ETCO2 and CXR verification      CXR findings:  Appropriate position  Post-procedure details:     Procedure completion:  Tolerated        Medical Decision Making:      Comorbidities that affect care:    Malignant melanoma with metastasis to brain, hypercholesterolemia, hypertension    External Notes reviewed:    Previous Clinic Note: Chemotherapy infusion for malignant melanoma 12/5/2024      The following orders were placed and all results were independently analyzed by me:  Orders Placed This Encounter   Procedures    Intubation    Blood Culture - Blood,    Blood Culture - Blood,    Respiratory Culture - Sputum, ET Suction    CT Abdomen Pelvis Without Contrast    XR Chest 1 View    XR Chest 1 View    [place] Draw    Comprehensive Metabolic Panel    Lipase    Urinalysis With Microscopic If Indicated (No Culture) - Urine, Clean Catch    Magnesium    CBC Auto Differential    Arterial Blood Gas + Electrolytes    Blood Gas, Arterial -    STAT Lactic Acid, Reflex    [place] Draw    Urinalysis, Microscopic Only - Urine, Clean Catch    Manual Differential    Comprehensive Metabolic Panel    CBC Auto Differential    CBC (No Diff)    Blood Gas, Arterial -    Vancomycin, Random    NPO Diet NPO Type: Strict NPO    Undress & Gown    Vital Signs Every Hour and Per Hospital Policy Based on Patient Condition    Telemetry - Place Orders & Notify Provider of Results When Patient Experiences Acute Chest Pain, Dysrhythmia or Respiratory Distress    Continuous Pulse Oximetry    Height & Weight    Daily Weights    Intake & Output    Oral Care - Patient Not on NPPV & Not Intubated    Target Arousal Level RASS 0 to -2    Use Mobility Guidelines for Advancement of Activity    Saline Lock & Maintain IV Access    Daily CHG Bath While in Critical Care    Place Sequential Compression Device    Maintain Sequential Compression Device    Code  Status and Medical Interventions: No CPR (Do Not Attempt to Resuscitate); Full Support    Pulmonology (on-call MD unless specified)    Hospitalist (on-call MD unless specified)    POC Chem Panel    POC Lactate    POC Chem Panel    POC Lactate    Insert Peripheral IV    Insert Peripheral IV    Inpatient Admission    CBC & Differential    Green Top (Gel)    Lavender Top    Gold Top - SST    Light Blue Top    Extra Tubes    Gold Top - SST    Light Blue Top    Lavender Top    Extra Tubes    Gold Top - SST    Light Blue Top    Extra Tubes    Lavender Top    CBC & Differential       Medications Given in the Emergency Department:  Medications   sodium chloride 0.9 % flush 10 mL (has no administration in time range)   HYDROmorphone PF (DILAUDID) injection 0.5 mg (0 mg Intravenous Hold 12/7/24 0038)   ondansetron (ZOFRAN) injection 4 mg (0 mg Intravenous Hold 12/7/24 0038)   propofol (DIPRIVAN) infusion 10 mg/mL 100 mL (5 mcg/kg/min × 66.4 kg Intravenous Rate/Dose Change 12/7/24 0211)   sodium bicarbonate 150 mEq/1000 mL D5W infusion (150 mEq Intravenous New Bag 12/7/24 0050)   norepinephrine (LEVOPHED) 8 mg in 250 mL NS infusion (premix) (0 mcg/kg/min × 66.4 kg Intravenous Stopped 12/7/24 0315)   EPINEPHrine 5 mg in 250 mL NS infusion (has no administration in time range)   nitroglycerin (NITROSTAT) SL tablet 0.4 mg (has no administration in time range)   sodium chloride 0.9 % flush 10 mL (has no administration in time range)   sodium chloride 0.9 % flush 10 mL (has no administration in time range)   sodium chloride 0.9 % infusion 40 mL (has no administration in time range)   mupirocin (BACTROBAN) 2 % nasal ointment 1 Application (has no administration in time range)   sennosides-docusate (PERICOLACE) 8.6-50 MG per tablet 2 tablet (has no administration in time range)     And   polyethylene glycol (MIRALAX) packet 17 g (has no administration in time range)     And   bisacodyl (DULCOLAX) EC tablet 5 mg (has no administration  in time range)     And   bisacodyl (DULCOLAX) suppository 10 mg (has no administration in time range)   sodium chloride 0.9 % infusion (has no administration in time range)   acetaminophen (TYLENOL) tablet 650 mg (has no administration in time range)     Or   acetaminophen (TYLENOL) suppository 650 mg (has no administration in time range)   ondansetron (ZOFRAN) injection 4 mg (has no administration in time range)   pantoprazole (PROTONIX) injection 40 mg (has no administration in time range)   piperacillin-tazobactam (ZOSYN) IVPB 3.375 g IVPB in 100 mL NS (VTB) (has no administration in time range)   Pharmacy to dose vancomycin (has no administration in time range)   vasopressin (PITRESSIN) 0.2 Units/mL infusion  - ADS Override Pull (has no administration in time range)   vasopressin (PITRESSIN) 20 units in 100 mL NS infusion (has no administration in time range)   piperacillin-tazobactam (ZOSYN) IVPB 3.375 g IVPB in 100 mL NS (VTB) (0 g Intravenous Stopped 12/7/24 0212)   vancomycin 1250 mg/250 mL 0.9% NS IVPB ([facility]) (1,250 mg Intravenous New Bag 12/7/24 0200)   sodium chloride 0.9 % bolus 1,000 mL (0 mL Intravenous Stopped 12/7/24 0050)   insulin regular (humuLIN R,novoLIN R) injection 10 Units (10 Units Intravenous Given 12/7/24 0021)   dextrose (D50W) (25 g/50 mL) IV injection 25 g (25 g Intravenous Given 12/7/24 0024)   sodium chloride 0.9 % bolus 1,000 mL (0 mL Intravenous Stopped 12/7/24 0207)   calcium chloride injection 1 g (1 g Intravenous Given 12/7/24 0108)   atropine sulfate injection 1 mg (1 mg Intravenous Given 12/7/24 0108)   EPINEPHrine 5 mg in 250 mL NS infusion (0.44 mcg/kg/min × 66.4 kg  Rate/Dose Change 12/7/24 0228)        ED Course:    The patient was initially evaluated in the triage area where orders were placed. The patient was later dispositioned by Nando Curiel MD.      The patient was advised to stay for completion of workup which includes but is not limited to communication of labs and  radiological results, reassessment and plan. The patient was advised that leaving prior to disposition by a provider could result in critical findings that are not communicated to the patient.     ED Course as of 12/07/24 0631   Fri Dec 06, 2024   2115 --- PROVIDER IN TRIAGE NOTE ---    The patient was evaluated by me, Yang Sales in triage. Orders were placed and the patient is currently awaiting disposition.    [CB]   2328 I discussed the patient's abnormal CT findings with Dr. Costa of radiology. [JS]   2329 Nursing staff notified me that they are having difficulty obtaining IV access.  I have placed a ultrasound-guided peripheral IV in the right antecubital fossa 18-gauge [JS]   Sat Dec 07, 2024   0017 Critical care alert was initiated as patient had a bradycardic cardiac arrest.  Patient was treated with multiple doses of epinephrine, atropine, sodium bicarb, and calcium.  I suspect the patient was severely hyperkalemic.  Patient was intubated during the code and placed on mechanical ventilation.  The patient had return of spontaneous circulation.  He was treated with etomidate for sedation and ultimately propofol.  He was treated with 1 L normal saline. [JS]   0024 I discussed patient's workup and presentation with Dr. Peters of general surgery.  He recommends continued stabilization measures as it is unlikely the patient is a surgical candidate. [JS]   0030 My interpretation of EKG: Sinus tachycardia 100, peaked T waves concerning for hyperkalemia, mild diffuse ST depression [JS]   0128 I discussed patient's workup presentation and surgical consultation with Dr. Macias of the intensive care unit.  He agrees with current measures.  He agrees the patient is in critical condition and has a very poor prognosis and will likely suffer another cardiac arrest. [JS]   0212 Discussed patient's workup and presentation with the hospitalist who agrees to admission.  The patient's airway is intact, vital signs, and  respiratory status are unstable and the patient is in critical condition at this time.  I have expressed to the patient's family that I believe he will likely suffered another cardiac arrest I believe at that time any further resuscitation efforts will be futile. [JS]      ED Course User Index  [CB] Yang Sales PA-C  [JS] Nando Curiel MD       Labs:    Lab Results (last 24 hours)       Procedure Component Value Units Date/Time    CBC & Differential [[id number]]  (Abnormal) Collected: 12/06/24 2117    Specimen: Blood Updated: 12/07/24 0152    Narrative:      The following orders were created for panel order CBC & Differential.  Procedure                               Abnormality         Status                     ---------                               -----------         ------                     CBC Auto Differential[[id number]]        Abnormal            Final result               Scan Slide[[id number]]                                                                    Please view results for these tests on the individual orders.    CBC Auto Differential [[id number]]  (Abnormal) Collected: 12/06/24 2117    Specimen: Blood Updated: 12/07/24 0137     WBC 66.09 10*3/mm3      RBC 3.75 10*6/mm3      Hemoglobin 12.8 g/dL      Hematocrit 37.6 %      .3 fL      MCH 34.1 pg      MCHC 34.0 g/dL      RDW 22.5 %      RDW-SD 74.6 fl      MPV --     Comment: Test unavailable due to sample interference        Platelets 124 10*3/mm3      Neutrophil % 87.9 %      Lymphocyte % 0.9 %      Monocyte % 3.9 %      Eosinophil % 0.2 %      Basophil % 0.1 %      Immature Grans % 7.0 %      Neutrophils, Absolute 58.08 10*3/mm3      Lymphocytes, Absolute 0.60 10*3/mm3      Monocytes, Absolute 2.60 10*3/mm3      Eosinophils, Absolute 0.14 10*3/mm3      Basophils, Absolute 0.07 10*3/mm3      Immature Grans, Absolute 4.60 10*3/mm3      nRBC 0.1 /100 WBC     Narrative:      Ok to released per Mickey GOODRICH    Manual Differential  [[id number]]  (Abnormal) Collected: 12/06/24 2117    Specimen: Blood Updated: 12/07/24 0332     Neutrophil % 86.0 %      Lymphocyte % 0.0 %      Monocyte % 5.0 %      Bands %  9.0 %      Neutrophils Absolute 62.79 10*3/mm3      Lymphocytes Absolute 0.00 10*3/mm3      Monocytes Absolute 3.30 10*3/mm3      nRBC 1.0 /100 WBC      RBC Morphology Normal     WBC Morphology Normal     Platelet Morphology Normal    Blood Culture - Blood, Arm, Right [[id number]] Collected: 12/06/24 2329    Specimen: Blood from Arm, Right Updated: 12/06/24 2333    Blood Culture - Blood, Arm, Left [[id number]] Collected: 12/06/24 2329    Specimen: Blood from Arm, Left Updated: 12/06/24 2332    POC Chem Panel [[id number]]  (Abnormal) Collected: 12/06/24 2356    Specimen: Arterial Blood Updated: 12/07/24 0001     Glucose 109 mg/dL      Comment: Serial Number: [id number]:  [id number]        Sodium 136 mmol/L      POC Potassium 9.0 mmol/L      Ionized Calcium 1.44 mmol/L      Chloride 94 mmol/L      Creatinine 2.16 mg/dL      BUN 47 mg/dL      CO2 Content 29.6 mmol/L      Notified Who Dr. Curiel     Read Back Yes    Blood Gas, Arterial - [[id number]]  (Abnormal) Collected: 12/06/24 2356    Specimen: Arterial Blood Updated: 12/07/24 0001     Site Left Radial     Corbin's Test Positive     pH, Arterial 7.281 pH units      pCO2, Arterial 63.8 mm Hg      pO2, Arterial 507.5 mm Hg      HCO3, Arterial 30.1 mmol/L      Base Excess, Arterial 3.3 mmol/L      Comment: Serial Number: [id number]:  [id number]        O2 Saturation, Arterial 100.0 %      Hemoglobin, Blood Gas 4.5 g/dL      Hematocrit, Blood Gas 13.0 %      Barometric Pressure for Blood Gas 753.7000 mmHg      Modality Adult Vent     FIO2 100 %      Ventilator Mode AC     Set Tidal Volume 440     PEEP 5     Notified Who Dr. Curiel     Read Back Yes     Notified Time --     Hemodilution No     Respiratory Rate 20     PO2/FIO2 508    POC Lactate [[id number]]  (Abnormal) Collected: 12/06/24 2356    Specimen:  Arterial Blood Updated: 12/07/24 0001     Lactate 10.0 mmol/L      Comment: Serial Number: [id number]:  [id number]        Notified Time --     Notified Who Dr. Curiel     Read Back Yes    Respiratory Culture - Sputum, ET Suction [[id number]] Collected: 12/07/24 0016    Specimen: Sputum from ET Suction Updated: 12/07/24 0020    Urinalysis With Microscopic If Indicated (No Culture) - Urine, Clean Catch [[id number]]  (Abnormal) Collected: 12/07/24 0028    Specimen: Urine, Clean Catch Updated: 12/07/24 0142     Color, UA Red     Appearance, UA Cloudy     pH, UA --     Comment: Test unavailable due to sample interference        Specific [place], UA --     Comment: Test unavailable due to sample interference        Glucose, UA --     Comment: Test unavailable due to sample interference        Ketones, UA --     Comment: Test unavailable due to sample interference        Bilirubin, UA --     Comment: Test unavailable due to sample interference        Blood, UA --     Comment: Test unavailable due to sample interference        Protein, UA --     Comment: Test unavailable due to sample interference        Leuk Esterase, UA --     Comment: Test unavailable due to sample interference        Nitrite, UA --     Comment: Test unavailable due to sample interference        Urobilinogen, UA --     Comment: Test unavailable due to sample interference       Urinalysis, Microscopic Only - Urine, Clean Catch [[id number]]  (Abnormal) Collected: 12/07/24 0028    Specimen: Urine, Clean Catch Updated: 12/07/24 0140     RBC, UA None Seen /HPF      WBC, UA 0-2 /HPF      Bacteria, UA 3+ /HPF      Squamous Epithelial Cells, UA 0-2 /HPF      Hyaline Casts, UA 0-2 /LPF      Methodology Automated Microscopy    Comprehensive Metabolic Panel [[id number]] Collected: 12/07/24 0033    Specimen: Blood Updated: 12/07/24 0143     Glucose --     Comment: Unable to report due to gross hemolysis        BUN --     Comment: Unable to report due to gross hemolysis         Creatinine --     Comment: Unable to report due to gross hemolysis        Sodium --     Comment: Unable to report due to gross hemolysis        Potassium --     Comment: Unable to report due to gross hemolysis        Chloride --     Comment: Unable to report due to gross hemolysis        CO2 --     Comment: Unable to report due to gross hemolysis        Calcium --     Comment: Unable to report due to gross hemolysis        Total Protein --     Comment: Unable to report due to gross hemolysis        Albumin --     Comment: Unable to report due to gross hemolysis        ALT (SGPT) --     Comment: Unable to report due to gross hemolysis        AST (SGOT) --     Comment: Unable to report due to gross hemolysis        Alkaline Phosphatase --     Comment: Unable to report due to gross hemolysis        Total Bilirubin --     Comment: Unable to report due to gross hemolysis        Globulin --     Comment: Unable to calculate        A/G Ratio --     Comment: Unable to report due to gross hemolysis        BUN/Creatinine Ratio --     Comment: Unable to report due to gross hemolysis        Anion Gap --     Comment: Unable to report due to gross hemolysis       Narrative:      GFR Normal >60  Chronic Kidney Disease <60  Kidney Failure <15    The GFR formula is only valid for adults with stable renal function between ages 18 and 70.    Lipase [[id number]] Collected: 12/07/24 0033    Specimen: Blood Updated: 12/07/24 0142     Lipase --     Comment: Unable to report due to gross hemolysis       Magnesium [[id number]] Collected: 12/07/24 0033    Specimen: Blood Updated: 12/07/24 0142     Magnesium --     Comment: Unable to report due to gross hemolysis       STAT Lactic Acid, Reflex [[id number]]  (Abnormal) Collected: 12/07/24 0033    Specimen: Blood Updated: 12/07/24 0148     Lactate 7.7 mmol/L     Comprehensive Metabolic Panel [[id number]]  (Abnormal) Collected: 12/07/24 0214    Specimen: Blood Updated: 12/07/24 0433     Glucose  120 mg/dL      Comment: Specimen grossly hemolyzed. Result may be inaccurate.        BUN 51 mg/dL      Comment: Specimen grossly hemolyzed. Result may be inaccurate.        Creatinine 1.62 mg/dL      Comment: Specimen grossly hemolyzed. Result may be inaccurate.        Sodium 133 mmol/L      Comment: Specimen grossly hemolyzed. Result may be inaccurate.        Potassium 7.8 mmol/L      Comment: Specimen grossly hemolyzed.  Result may be falsely elevated.        Chloride 98 mmol/L      Comment: Specimen grossly hemolyzed. Result may be inaccurate.        CO2 11.5 mmol/L      Comment: Specimen grossly hemolyzed. Result may be inaccurate.        Calcium 8.3 mg/dL      Comment: Specimen grossly hemolyzed. Result may be inaccurate.        Total Protein 5.4 g/dL      Comment: Specimen grossly hemolyzed. Result may be inaccurate.        Albumin 0.6 g/dL      Comment: Specimen grossly hemolyzed. Result may be inaccurate.        ALT (SGPT) 73 U/L      Comment: Specimen grossly hemolyzed.  Result may  be falsely elevated.        AST (SGOT) 466 U/L      Comment: Specimen grossly hemolyzed.  Result may be falsely elevated.        Alkaline Phosphatase 129 U/L      Comment: Specimen grossly hemolyzed. Result may be inaccurate.        Total Bilirubin 4.5 mg/dL      Comment: Specimen grossly hemolyzed.  Results may be affected.        Globulin 4.8 gm/dL      A/G Ratio 0.1 g/dL      BUN/Creatinine Ratio 31.5     Anion Gap 23.5 mmol/L      eGFR 43.2 mL/min/1.73     Narrative:      GFR Normal >60  Chronic Kidney Disease <60  Kidney Failure <15    The GFR formula is only valid for adults with stable renal function between ages 18 and 70.    CBC & Differential [[id number]]  (Abnormal) Collected: 12/07/24 0214    Specimen: Blood Updated: 12/07/24 0440    Narrative:      The following orders were created for panel order CBC & Differential.  Procedure                               Abnormality         Status                     ---------                                -----------         ------                     CBC Auto Differential[[id number]]        Abnormal            Final result               Scan Slide[[id number]]                                                                    Please view results for these tests on the individual orders.    CBC Auto Differential [[id number]]  (Abnormal) Collected: 12/07/24 0214    Specimen: Blood Updated: 12/07/24 0440     WBC 24.92 10*3/mm3      Comment: Specimen grossly hemolyzed. Result may be inaccurate.        RBC 1.50 10*6/mm3      Comment: Specimen grossly hemolyzed. Result may be inaccurate.        Hemoglobin 4.4 g/dL      Comment: Specimen grossly hemolyzed. Result may be inaccurate.        Hematocrit 8.0 %      Comment: Specimen grossly hemolyzed. Result may be inaccurate.        MCV 53.3 fL      Comment: Specimen grossly hemolyzed. Result may be inaccurate.        MCH 29.3 pg      Comment: Specimen grossly hemolyzed. Result may be inaccurate.        MCHC 55.0 g/dL      Comment: Specimen grossly hemolyzed. Result may be inaccurate.        RDW 25.9 %      Comment: Specimen grossly hemolyzed. Result may be inaccurate.        RDW-SD 42.7 fl      Comment: Specimen grossly hemolyzed. Result may be inaccurate.        MPV --     Comment: Test unavailable due to sample interference        Platelets 48 10*3/mm3      Comment: Specimen grossly hemolyzed. Result may be inaccurate.        Neutrophil % 77.4 %      Lymphocyte % 1.2 %      Monocyte % 2.2 %      Eosinophil % 0.1 %      Basophil % 0.1 %      Immature Grans % 19.0 %      Neutrophils, Absolute 19.30 10*3/mm3      Lymphocytes, Absolute 0.30 10*3/mm3      Monocytes, Absolute 0.55 10*3/mm3      Eosinophils, Absolute 0.02 10*3/mm3      Basophils, Absolute 0.02 10*3/mm3      Immature Grans, Absolute 4.73 10*3/mm3      nRBC 0.8 /100 WBC     POC Chem Panel [[id number]]  (Abnormal) Collected: 12/07/24 0306    Specimen: Arterial Blood Updated: 12/07/24 0312      Glucose 103 mg/dL      Comment: Serial Number: [id number]:  [id number]        Sodium 136 mmol/L      POC Potassium 8.2 mmol/L      Ionized Calcium 1.19 mmol/L      Chloride 96 mmol/L      Creatinine 2.33 mg/dL      BUN 41 mg/dL      CO2 Content 22.3 mmol/L      Notified Who JOLEEN Groves     Read Back Yes    Blood Gas, Arterial - [[id number]]  (Abnormal) Collected: 12/07/24 0306    Specimen: Arterial Blood Updated: 12/07/24 0312     Site Right Radial     Corbin's Test Positive     pH, Arterial 7.265 pH units      pCO2, Arterial 48.2 mm Hg      pO2, Arterial 40.8 mm Hg      HCO3, Arterial 21.9 mmol/L      Base Excess, Arterial -4.3 mmol/L      Comment: Serial Number: [id number]:  [id number]        O2 Saturation, Arterial 68.1 %      Hematocrit, Blood Gas <10.0 %      Barometric Pressure for Blood Gas 749.7000 mmHg      Modality Adult Vent     FIO2 60 %      Ventilator Mode AC     Set Tidal Volume 440     PEEP 5     Notified Who JOLEEN Groves     Read Back Yes     Notified Time --     Hemodilution No     Respiratory Rate 20     PO2/FIO2 68    POC Lactate [[id number]]  (Abnormal) Collected: 12/07/24 0306    Specimen: Arterial Blood Updated: 12/07/24 0312     Lactate 10.7 mmol/L      Comment: Serial Number: [id number]:  [id number]        Notified Time --     Notified Who JOLEEN Groves     Read Back Yes             Imaging:    XR Chest 1 View    Result Date: 12/6/2024  AP PORTABLE CHEST  HISTORY: Intubation.  COMPARISON: 12/6/2024 at 2126 hours  TECHNIQUE: AP portable chest x-ray.  FINDINGS: The endotracheal tube is in good position in the midthoracic trachea. Chest x-ray is otherwise unchanged in the short interval. No pneumothorax.      Well-positioned endotracheal tube.  Electronically Signed By-Dr. Collin Costa MD On:12/6/2024 11:56 PM      CT Abdomen Pelvis Without Contrast    Result Date: 12/6/2024  CT ABDOMEN PELVIS WO CONTRAST Date of Exam: 12/6/2024 10:06 PM EST Indication: Abdominal and flank pain with nausea. Hematuria.  History of malignant melanoma. Comparison: 11/19/2024 Technique: Axial CT images were obtained of the abdomen and pelvis without the administration of contrast. Reconstructed coronal and sagittal images were also obtained. Automated exposure control and iterative construction methods were used. Findings: Limited exam without contrast. Bilateral gynecomastia is present. There is extensive coronary artery disease. As seen on previous imaging, there are multiple right lower lobe pulmonary nodules, the largest of which measures 11 mm, previously 13 mm. There  is atherosclerotic disease with no aortic aneurysm. Gallbladder is normal. No biliary obstruction. There is a large nonobstructing right kidney stone measuring up to 14 mm in size. No ureteral stones or hydronephrosis identified. There appear to be bilateral renal cysts, poorly characterized in the absence of contrast. Faintly visible are innumerable hepatic metastases, better seen on the prior contrast-enhanced exam. The unenhanced solid abdominal organs are otherwise normal. Urinary bladder is normal. Prostate gland is mildly enlarged. No definite adenopathy. The examination is markedly abnormal. There is extensive gas and debris now noted in the retroperitoneum extending throughout an enlarged left psoas muscle into the left upper quadrant and around the lower esophagus into the mediastinum. This also extends caudally into the left iliopsoas and iliacus musculature and into the visualized left proximal thigh. Similar changes extend through a left inguinal hernia into the left inguinal canal. There are scattered pockets of free air in the left side of the abdomen as well. There is also some fluid and fat stranding. This does appear to be separate from the colon. There are multiple small bowel loops in the left lower quadrant, and this could reflect a small bowel perforation with abscess formation. Surgical consultation is recommended. There is some lower colonic  diverticulosis without definite diverticulitis. There is formed stool in the right colon, and the remainder of the colon is gas-filled with the lower colon being relatively decompressed. There are gas-filled small bowel loops that may reflect ileus or developing bowel obstruction. The stomach is distended with fluid and gas as well. There is also gas now noted within the L5 vertebral body, and there is probably a pathologic fracture along the superior endplate. This is highly suspicious for osteomyelitis.     1. Markedly abnormal examination demonstrating abnormal soft tissue gas and debris along the entire length of the left psoas muscle extending into the iliopsoas muscles and iliacus muscles in the pelvis. Abnormal soft tissue gas extends into the left proximal thigh and left inguinal canal as well, as well as into the visualized lower mediastinum. This is likely secondary to infection from a bowel perforation, probably small bowel in the left lower quadrant as the adjacent colon is not clearly involved. Surgical consultation is recommended. A drainable fluid collection is not seen. There is a small amount of free intraperitoneal air in the left side of the abdomen as well. 2. Gas-filled mildly distended small bowel loops may reflect an ileus or developing bowel obstruction. 3. Large nonobstructing right kidney stone. No ureteral stones or hydronephrosis on either side. 4. Gas in the L5 vertebral body is new and likely reflects osteomyelitis with a mild pathologic superior endplate fracture. There is adjacent soft tissue gas around the L5 vertebral body. 5. Additional nonacute findings as above. NOTIFICATION: Critical Value/emergent results were called by telephone at the time of interpretation on 12/6/2024 11:02 PM EST to Nando Curiel MD who verbally acknowledged these results. Electronically Signed: Collin Costa MD  12/6/2024 11:03 PM EST  Workstation ID: [id number]    XR Chest 1 View    Result Date:  12/6/2024  XR CHEST 1 VW Date of Exam: 12/6/2024 9:25 PM EST Indication: soa Comparison: 12/2/2024 Findings: Cardiac and mediastinal contours are normal. Port-A-Cath tip at the cavoatrial junction. Pulmonary vascularity is normal. Again seen is a nodule at the right lung base that appears similar to prior. Please see PET CT report from 12/4/2024. No acute infiltrates. No pneumothorax. There are multiple gas-filled loops of bowel below the diaphragm, incompletely evaluated.     No acute cardiopulmonary findings. Distended bowel loops below the diaphragm are noted. Electronically Signed: Collin Costa MD  12/6/2024 9:39 PM EST  Workstation ID: [id number]       Differential Diagnosis and Discussion:      Cardiac Arrest: Differential diagnosis includes but is not limited to ventricular tachycardia, ventricular fibrillation, asystole, PEA, respiratory arrest due to hypoxia or metabolic abnormalities.    All labs were reviewed and interpreted by me.  All X-rays impressions were independently interpreted by me.  EKG was interpreted by me.  CT scan radiology impression was interpreted by me.    MDM         Patient was placed on the cardiac monitor after being given IV epinephrine, norepinephrine, sodium bicarb, calcium, propofol.  They were monitored for ventricular ectopy, arrhythmia, tachycardia, hypoxia, and changes in blood pressure.  Patient was rechecked several times throughout their stay for mental status decline and for reassessment of worsening changes in vital signs.     Total Critical Care time of 60 minutes. Total critical care time documented does not include time spent on separately billed procedures for services of nurses or physician assistants. I personally saw and examined the patient. I have reviewed all diagnostic interpretations and treatment plans as written. I was present for the key portions of any procedures performed and the inclusive time noted in any critical care statement. Critical care time  includes patient management by me, time spent at the patients bedside,  time to review lab and imaging results, discussing patient care, documentation in the medical record, and time spent with family or caregiver.      Sepsis criteria was met in the emergency department and the Sepsis protocol (including antibiotic administration) was initiated.      SIRS criteria considered:   1.  Temperature > 100.4 or <96.8    2.  Heart Rate > 90    3.  Respiratory Rate > 22    4.  WBC > 12K or <4K.             Severe Sepsis:     Respiratory: Mechanical Ventilation or Bipap  Hypotension: SBP > 90 or MAP < 65  Renal: Creatinine > 2  Metabolic: Lactic Acid > 2  Hematologic: Platelets < 100K or INR > 1.5  Hepatic: BILI  >  2  CNS: Sudden AMS     Septic Shock:     Severe Sepsis + Persistent hypotension or Lactic Acid > 4     Normal saline bolus, Antibiotics, and final disposition was based on these definitions.        Sepsis was recognized at 2305    Antibiotics were ordered.     30 mL/kg bolus was indicated.     The patient presents with 2 out of the 4 SIRS criteria and a suspected source for sepsis.  Patient was evaluated and placed on a cardiac monitor for fear of worsening tachycardia and life-threatening hypotension.  Patient was monitored for shock and signs of end-organ damage.  Mental status was repeatedly checked throughout the ED stay.  Medications were ordered by me which includes Zosyn and vancomycin.  The case was discussed at length with admitting physician.    Total Critical Care time of 60 minutes. Total critical care time documented does not include time spent on separately billed procedures for services of nurses or physician assistants. I personally saw and examined the patient. I have reviewed all diagnostic interpretations and treatment plans as written. I was present for the key portions of any procedures performed and the inclusive time noted in any critical care statement. Critical care time includes patient  management by me, time spent at the patients bedside,  time to review lab and imaging results, discussing patient care, documentation in the medical record, and time spent with family or caregiver.    Patient Care Considerations:          Consultants/Shared Management Plan:    Hospitalist: I have discussed the case with the hospitalist who agrees to accept the patient for admission.  Consultant: I have discussed the case with the intensivist who agrees to consult on the patient.    Social Determinants of Health:    Patient has presented with family members who are responsible, reliable and will ensure follow up care.      Disposition and Care Coordination:    Admit:   Through independent evaluation of the patient's history, physical, and imperical data, the patient meets criteria for inpatient admission to the hospital.        Final diagnoses:   Severe sepsis   Hyperkalemia   Cardiac arrest   Metastatic melanoma        ED Disposition       ED Disposition   Decision to Admit    Condition   --    Comment   Level of Care: Critical Care [6]   Diagnosis: Cardiac arrest [427.5.ICD-9-CM]   Admitting Physician: ANUSHKA PINEDA [[id number]]   Certification: I Certify That Inpatient Hospital Services Are Medically Necessary For Greater Than 2 Midnights                 This medical record created using voice recognition software.             Nando Curiel MD  12/07/24 0631

## 2024-12-08 LAB
BACTERIA SPEC AEROBE CULT: ABNORMAL
BACTERIA SPEC AEROBE CULT: ABNORMAL
BACTERIA SPEC RESP CULT: ABNORMAL
BACTERIA SPEC RESP CULT: ABNORMAL
GRAM STN SPEC: ABNORMAL
ISOLATED FROM: ABNORMAL
ISOLATED FROM: ABNORMAL

## 2024-12-10 LAB
DNA RANGE(S) EXAMINED NAR: NORMAL
GENE DIS ANL INTERP-IMP: NORMAL
GENE DIS ASSESSED: NORMAL
REASON FOR STUDY: NORMAL
TEMPUS BLOOD TUMOR MUTATIONAL BURDEN: 2.2 M/MB
TEMPUS GENOMIC NOTE: NORMAL
TEMPUS LCA: NORMAL
TEMPUS MSI NOTE: NORMAL
TEMPUS PORTAL: NORMAL
TEMPUS TREATMENT IMPLICATIONS NOTE: NORMAL

## 2024-12-19 NOTE — SIGNIFICANT NOTE
Patient past around 0 700 on 12/7/2024 after arriving on the unit floor.  Family requested patient be comfort care with no interventions.

## 2024-12-21 LAB
DNA RANGE(S) EXAMINED NAR: NORMAL
GENE DIS ANL INTERP-IMP: POSITIVE
GENE DIS ASSESSED: NORMAL
GENE MUT TESTED BLD/T: 13.7 M/MB
MSI CA SPEC-IMP: NORMAL
REASON FOR STUDY: NORMAL
TEMPUS GERMLINE NOTE: NORMAL
TEMPUS LCA: NORMAL
TEMPUS PERTINENTNEGATIVES: NORMAL
TEMPUS PORTAL: NORMAL
TEMPUS THERAPY10: NORMAL
TEMPUS THERAPY1: NORMAL
TEMPUS THERAPY2: NORMAL
TEMPUS THERAPY3: NORMAL
TEMPUS THERAPY4: NORMAL
TEMPUS THERAPY5: NORMAL
TEMPUS THERAPY6: NORMAL
TEMPUS THERAPY7: NORMAL
TEMPUS THERAPY8: NORMAL
TEMPUS THERAPY9: NORMAL
TEMPUS THERAPYCOUNT: 10
TEMPUS TRIALCOUNT: 3
TEMPUS TRIALMATCHES1: NORMAL
TEMPUS TRIALMATCHES2: NORMAL
TEMPUS TRIALMATCHES3: NORMAL

## 2024-12-29 NOTE — DISCHARGE SUMMARY
[facility]  DEATH DISCHARGE SUMMARY    Patient Name: Leon Lemon  : 1945  MRN: [id number]    Date of Admission: 2024  Date of Death: 2024  Time of Death: 0 300  Primary Care Physician: Law Cole MD    Consults       Date and Time Order Name Status Description    2024 10:59 AM Inpatient Internal Medicine Consult Completed     2024  8:20 PM Inpatient Radiation Oncology Consult Completed     2024 10:32 AM Hematology & Oncology Inpatient Consult Completed     2024  8:14 AM Neurosurgery (on-call MD unless specified) Completed     2024  9:21 PM Inpatient Neurosurgery Consult Completed             Admitting Diagnosis: Severe sepsis, metastatic melanoma      Discharge Diagnoses: Cardiac arrest from severe sepsis      Hospital Course     Hospital Course:  Leon Lemon is a 79 y.o. 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  When seen patient was intubated and on vent so history was given by family at bedside.  Per family patient started chemotherapy infusion last 2 days prior and while at home he was complaining of significant abdominal pain with nausea and vomiting, shortness of breath, flank pain, and hematuria.  Patient had been taking Zofran at home with little to no relief.  Due to concerns patient was brought to the ED for further evaluation.  In the ED patient was tachypneic, hypoxic, and hypotensive.  Patient eventually went to cardiac arrest but ROSC was achieved after a few rounds of CPR.  Labs showed severe leukocytosis with bandemia, lactic acidosis, severe anemia, and hypokalemia.  CT abdomen showed extensive soft tissue gas along the entire left psoas muscle and pelvis as well as intra-abdominal gas.  Surgery was consulted but given the  patient's clinical status he was deemed not a surgical candidate.  Family decided to make the patient DNR given his poor prognosis although they wanted full support until they can speak with the remaining family members.  Patient admitted to the intensive care unit for further management .  Patient passed within minutes of arriving to ICU.              Discharge Disposition:  [facility]    Electronically signed by Corey Brock MD, 12/29/24, 6:38 AM EST.

## 2024-12-30 LAB
DNA RANGE(S) EXAMINED NAR: NORMAL
GENE DIS ANL INTERP-IMP: POSITIVE
GENE DIS ASSESSED: NORMAL
GENE MUT TESTED BLD/T: 13.7 M/MB
MSI CA SPEC-IMP: NORMAL
REASON FOR STUDY: NORMAL
TEMPUS AMENDMENTNOTE1: NORMAL
TEMPUS GERMLINE NOTE: NORMAL
TEMPUS LCA: NORMAL
TEMPUS PERTINENTNEGATIVES: NORMAL
TEMPUS PORTAL: NORMAL
TEMPUS THERAPY10: NORMAL
TEMPUS THERAPY1: NORMAL
TEMPUS THERAPY2: NORMAL
TEMPUS THERAPY3: NORMAL
TEMPUS THERAPY4: NORMAL
TEMPUS THERAPY5: NORMAL
TEMPUS THERAPY6: NORMAL
TEMPUS THERAPY7: NORMAL
TEMPUS THERAPY8: NORMAL
TEMPUS THERAPY9: NORMAL
TEMPUS THERAPYCOUNT: 10
TEMPUS TRIALCOUNT: 3
TEMPUS TRIALMATCHES1: NORMAL
TEMPUS TRIALMATCHES2: NORMAL
TEMPUS TRIALMATCHES3: NORMAL

## (undated) DEVICE — INTENDED FOR TISSUE SEPARATION, AND OTHER PROCEDURES THAT REQUIRE A SHARP SURGICAL BLADE TO PUNCTURE OR CUT.: Brand: BARD-PARKER ® CARBON RIB-BACK BLADES

## (undated) DEVICE — VASCULAR ACCESS-LF: Brand: MEDLINE INDUSTRIES, INC.

## (undated) DEVICE — PENCL SMOKE/EVAC MEGADYNE TELESCP 10FT

## (undated) DEVICE — GLV SURG BIOGEL LTX PF 7 1/2

## (undated) DEVICE — SUT MNCRYL 4/0 PS2 18 IN

## (undated) DEVICE — SOL IRR NACL 0.9PCT BO 1000ML

## (undated) DEVICE — SLV SCD KN/LEN ADJ EXPRSS BLENDED MD 1P/U

## (undated) DEVICE — ANTIBACTERIAL VIOLET BRAIDED (POLYGLACTIN 910), SYNTHETIC ABSORBABLE SUTURE: Brand: COATED VICRYL

## (undated) DEVICE — ADHS SKIN SURG TISS VISC PREMIERPRO EXOFIN HI/VISC FAST/DRY

## (undated) DEVICE — CVR PROB ULTRASND CIVFLEX GEN/PURP TELESCP/FOLD 5.5X58IN LF